# Patient Record
Sex: MALE | Race: WHITE | NOT HISPANIC OR LATINO | Employment: OTHER | URBAN - METROPOLITAN AREA
[De-identification: names, ages, dates, MRNs, and addresses within clinical notes are randomized per-mention and may not be internally consistent; named-entity substitution may affect disease eponyms.]

---

## 2017-02-27 ENCOUNTER — OFFICE VISIT (OUTPATIENT)
Dept: FAMILY MEDICINE | Facility: OTHER | Age: 57
End: 2017-02-27
Payer: COMMERCIAL

## 2017-02-27 VITALS
HEIGHT: 71 IN | WEIGHT: 190 LBS | DIASTOLIC BLOOD PRESSURE: 64 MMHG | OXYGEN SATURATION: 98 % | RESPIRATION RATE: 12 BRPM | BODY MASS INDEX: 26.6 KG/M2 | TEMPERATURE: 98.5 F | HEART RATE: 68 BPM | SYSTOLIC BLOOD PRESSURE: 106 MMHG

## 2017-02-27 DIAGNOSIS — J01.00 ACUTE NON-RECURRENT MAXILLARY SINUSITIS: Primary | ICD-10-CM

## 2017-02-27 PROCEDURE — 99213 OFFICE O/P EST LOW 20 MIN: CPT | Performed by: PHYSICIAN ASSISTANT

## 2017-02-27 RX ORDER — CLONAZEPAM 2 MG/1
2 TABLET ORAL AT BEDTIME
COMMUNITY
End: 2017-07-26

## 2017-02-27 ASSESSMENT — ANXIETY QUESTIONNAIRES
7. FEELING AFRAID AS IF SOMETHING AWFUL MIGHT HAPPEN: NOT AT ALL
6. BECOMING EASILY ANNOYED OR IRRITABLE: SEVERAL DAYS
5. BEING SO RESTLESS THAT IT IS HARD TO SIT STILL: SEVERAL DAYS
3. WORRYING TOO MUCH ABOUT DIFFERENT THINGS: NOT AT ALL
1. FEELING NERVOUS, ANXIOUS, OR ON EDGE: SEVERAL DAYS
2. NOT BEING ABLE TO STOP OR CONTROL WORRYING: NOT AT ALL
GAD7 TOTAL SCORE: 4
IF YOU CHECKED OFF ANY PROBLEMS ON THIS QUESTIONNAIRE, HOW DIFFICULT HAVE THESE PROBLEMS MADE IT FOR YOU TO DO YOUR WORK, TAKE CARE OF THINGS AT HOME, OR GET ALONG WITH OTHER PEOPLE: SOMEWHAT DIFFICULT

## 2017-02-27 ASSESSMENT — PAIN SCALES - GENERAL: PAINLEVEL: NO PAIN (0)

## 2017-02-27 ASSESSMENT — PATIENT HEALTH QUESTIONNAIRE - PHQ9: 5. POOR APPETITE OR OVEREATING: SEVERAL DAYS

## 2017-02-27 NOTE — MR AVS SNAPSHOT
"              After Visit Summary   2017    Benjamín Mendoza    MRN: [id number]           Patient Information     Date Of Birth          1960        Visit Information        Provider Department      2017 1:45 PM Aleida Larson PA-C [facility]        Today's Diagnoses     Acute non-recurrent maxillary sinusitis    -  1       Follow-ups after your visit        Who to contact     If you have questions or need follow up information about today's clinic visit or your schedule please contact [facility] directly at [phone].  Normal or non-critical lab and imaging results will be communicated to you by Cylandehart, letter or phone within 4 business days after the clinic has received the results. If you do not hear from us within 7 days, please contact the clinic through Cylandehart or phone. If you have a critical or abnormal lab result, we will notify you by phone as soon as possible.  Submit refill requests through SpaceList or call your pharmacy and they will forward the refill request to us. Please allow 3 business days for your refill to be completed.          Additional Information About Your Visit        MyChart Information     SpaceList lets you send messages to your doctor, view your test results, renew your prescriptions, schedule appointments and more. To sign up, go to [email or web address] . Click on \"Log in\" on the left side of the screen, which will take you to the Welcome page. Then click on \"Sign up Now\" on the right side of the page.     You will be asked to enter the access code listed below, as well as some personal information. Please follow the directions to create your username and password.     Your access code is: [email or web address]  Expires: 2017  3:15 PM     Your access code will  in 90 days. If you need help or a new code, please call your [facility] or [phone].        Care EveryWhere ID     This is your Care EveryWhere ID. This could be used " "by other organizations to access your [facility] medical records  [id number]        Your Vitals Were     Pulse Temperature Respirations Height Pulse Oximetry BMI (Body Mass Index)    68 98.5  F (36.9  C) (Oral) 12 5' 11\" (1.803 m) 98% 26.5 kg/m2       Blood Pressure from Last 3 Encounters:   02/27/17 106/64   11/30/16 112/70   08/10/16 122/80    Weight from Last 3 Encounters:   02/27/17 190 lb (86.2 kg)   11/30/16 185 lb 4.8 oz (84.1 kg)   08/10/16 188 lb 8 oz (85.5 kg)              Today, you had the following     No orders found for display         Today's Medication Changes          These changes are accurate as of: 2/27/17  2:05 PM.  If you have any questions, ask your nurse or doctor.               Start taking these medicines.        Dose/Directions    amoxicillin-clavulanate 875-125 MG per tablet   Commonly known as:  AUGMENTIN   Used for:  Acute non-recurrent maxillary sinusitis   Started by:  Aleida Larson PA-C        Dose:  1 tablet   Take 1 tablet by mouth 2 times daily   Quantity:  20 tablet   Refills:  0            Where to get your medicines      These medications were sent to [facility] - [address]   [address] [place] [address]     Phone:  [phone]     amoxicillin-clavulanate 875-125 MG per tablet                Primary Care Provider Office Phone # Fax #    Aleida Larson PA-C [phone]       [facility] [address] [place] [address]        Thank you!     Thank you for choosing [facility]  for your care. Our goal is always to provide you with excellent care. Hearing back from our patients is one way we can continue to improve our services. Please take a few minutes to complete the written survey that you may receive in the mail after your visit with us. Thank you!             Your Updated Medication List - Protect others around you: Learn how to safely use, store and throw away your " medicines at www.disposemymeds.org.          This list is accurate as of: 2/27/17  2:05 PM.  Always use your most recent med list.                   Brand Name Dispense Instructions for use    amoxicillin-clavulanate 875-125 MG per tablet    AUGMENTIN    20 tablet    Take 1 tablet by mouth 2 times daily       clotrimazole-betamethasone cream    LOTRISONE    30 g    Apply topically 2 times daily       klonoPIN 2 MG tablet   Generic drug:  clonazePAM      Take 2 mg by mouth 2 times daily as needed for anxiety Reported on 2/27/2017       metaxalone 800 MG tablet    SKELAXIN    90 tablet    TAKE 1 TABLET THREE TIMES A DAY       omeprazole 20 MG CR capsule    priLOSEC    90 capsule    TAKE 1 CAPSULE DAILY       ONE-A-DAY MENS PO      Take 1 tablet by mouth.       order for DME      1 Device Auto CPAP @@ 5-15 cm with heated humidifier and heated tube via mask of choice per order of Melody Orourke PA-C.       oxyCODONE-acetaminophen 5-325 MG per tablet    PERCOCET    90 tablet    Take 1-2 tablets by mouth every 4 hours as needed for pain (moderate to severe)

## 2017-02-27 NOTE — NURSING NOTE
"Chief Complaint   Patient presents with     URI       Initial /64  Pulse 68  Temp 98.5  F (36.9  C) (Oral)  Resp 12  Ht 5' 11\" (1.803 m)  Wt 190 lb (86.2 kg)  SpO2 98%  BMI 26.5 kg/m2 Estimated body mass index is 26.5 kg/(m^2) as calculated from the following:    Height as of this encounter: 5' 11\" (1.803 m).    Weight as of this encounter: 190 lb (86.2 kg).  Medication Reconciliation: complete       Tabatha Simental CMA ([facility])      "

## 2017-02-27 NOTE — PROGRESS NOTES
"  SUBJECTIVE:                                                    Benjamín Mendoza is a 56 year old male who presents to clinic today for the following health issues:      Acute Illness   Acute illness concerns:cold symptoms    Onset: since middle of January    Fever: no    Chills/Sweats: no    Headache (location?): YES, frontal    Sinus Pressure:YES, mostly frontal no cheek    Conjunctivitis:  no    Ear Pain: no    Rhinorrhea: no    Congestion: no    Sore Throat: no     Cough: YES-non-productive, productive of clear sputum    Wheeze: YES- some     Decreased Appetite: YES    Nausea: YES, upset stomach    Vomiting: no    Diarrhea:  YES- looser than normal, not diarrhea  for about 1 week no blood in stool    Dysuria/Freq.: no    Fatigue/Achiness: YES- both    Sick/Strep Exposure: YES     Therapies Tried and outcome: old Rx of amoxicillin or Augmentin took an incomplete rx of his sons, about 4 days, ioana villelazer cold plus, mucinex      Problem list and histories reviewed & adjusted, as indicated.  Additional history: as documented    BP Readings from Last 3 Encounters:   02/27/17 106/64   11/30/16 112/70   08/10/16 122/80    Wt Readings from Last 3 Encounters:   02/27/17 190 lb (86.2 kg)   11/30/16 185 lb 4.8 oz (84.1 kg)   08/10/16 188 lb 8 oz (85.5 kg)                  Labs reviewed in [facility]    ROS:  As above    OBJECTIVE:                                                    /64  Pulse 68  Temp 98.5  F (36.9  C) (Oral)  Resp 12  Ht 5' 11\" (1.803 m)  Wt 190 lb (86.2 kg)  SpO2 98%  BMI 26.5 kg/m2  Body mass index is 26.5 kg/(m^2).  GENERAL: healthy, alert and no distress  EYES: Eyes grossly normal to inspection  HENT: normal cephalic/atraumatic, ear canals and TM's normal, nose and mouth without ulcers or lesions, nasal mucosa edematous , oropharynx clear, oral mucous membranes moist and sinuses: not tender  NECK: no adenopathy, no asymmetry, masses, or scars and thyroid normal to palpation  RESP: lungs clear to " auscultation - no rales, rhonchi or wheezes  CV: regular rate and rhythm, normal S1 S2, no S3 or S4, no murmur, click or rub, no peripheral edema and peripheral pulses strong  MS: no gross musculoskeletal defects noted, no edema    Diagnostic Test Results:  none      ASSESSMENT/PLAN:                                                            1. Acute non-recurrent maxillary sinusitis  otc meds prn, take entire course even when feeling better, use probiotics   - amoxicillin-clavulanate (AUGMENTIN) 875-125 MG per tablet; Take 1 tablet by mouth 2 times daily  Dispense: 20 tablet; Refill: 0        Aleida Larson PA-C  [facility]  Electronically signed by Aleida Larson PA-C

## 2017-02-28 ASSESSMENT — PATIENT HEALTH QUESTIONNAIRE - PHQ9: SUM OF ALL RESPONSES TO PHQ QUESTIONS 1-9: 10

## 2017-02-28 ASSESSMENT — ANXIETY QUESTIONNAIRES: GAD7 TOTAL SCORE: 4

## 2017-03-20 ENCOUNTER — TELEPHONE (OUTPATIENT)
Dept: FAMILY MEDICINE | Facility: OTHER | Age: 57
End: 2017-03-20

## 2017-03-20 DIAGNOSIS — J01.01 ACUTE RECURRENT MAXILLARY SINUSITIS: Primary | ICD-10-CM

## 2017-03-20 RX ORDER — AZITHROMYCIN 250 MG/1
TABLET, FILM COATED ORAL
Qty: 6 TABLET | Refills: 0 | Status: SHIPPED | OUTPATIENT
Start: 2017-03-20 | End: 2017-04-03

## 2017-03-20 NOTE — TELEPHONE ENCOUNTER
Stated all the same things as before; nothing has really changed-sinus pressure, headaches, still coughing(non productive with some wheezing), tired and achy. Has been about 7-10 days since he took his last dose of amoxicillin. Pharmacy entered.    Tabatha Simental CMA ([facility])

## 2017-03-20 NOTE — TELEPHONE ENCOUNTER
Patient is calling to request another prescription of amoxicillin. Patient states still not feeling well. Please follow up with patient.    RADHA

## 2017-03-20 NOTE — TELEPHONE ENCOUNTER
please call pt- I sent a prescription for z pack for him to try if this does not help needs appt  Aleida Larson PA-C

## 2017-03-20 NOTE — TELEPHONE ENCOUNTER
Please call and see what his current symptoms are and what pharmacy if I send a med  Aleida Larson PA-C

## 2017-04-03 ENCOUNTER — HOSPITAL ENCOUNTER (EMERGENCY)
Facility: CLINIC | Age: 57
Discharge: LEFT BEFORE DISCHARGE | End: 2017-04-03
Payer: COMMERCIAL

## 2017-04-03 ENCOUNTER — HOSPITAL ENCOUNTER (OUTPATIENT)
Facility: CLINIC | Age: 57
Setting detail: OBSERVATION
Discharge: HOME | End: 2017-04-04
Attending: EMERGENCY MEDICINE | Admitting: INTERNAL MEDICINE
Payer: COMMERCIAL

## 2017-04-03 ENCOUNTER — OFFICE VISIT (OUTPATIENT)
Dept: FAMILY MEDICINE | Facility: CLINIC | Age: 57
End: 2017-04-03
Payer: COMMERCIAL

## 2017-04-03 ENCOUNTER — APPOINTMENT (OUTPATIENT)
Dept: GENERAL RADIOLOGY | Facility: CLINIC | Age: 57
End: 2017-04-03
Attending: EMERGENCY MEDICINE
Payer: COMMERCIAL

## 2017-04-03 VITALS
SYSTOLIC BLOOD PRESSURE: 132 MMHG | RESPIRATION RATE: 14 BRPM | TEMPERATURE: 98 F | BODY MASS INDEX: 27.96 KG/M2 | DIASTOLIC BLOOD PRESSURE: 78 MMHG | OXYGEN SATURATION: 97 % | HEIGHT: 69 IN | HEART RATE: 84 BPM | WEIGHT: 188.8 LBS

## 2017-04-03 DIAGNOSIS — E78.5 HYPERLIPIDEMIA, UNSPECIFIED HYPERLIPIDEMIA TYPE: Primary | ICD-10-CM

## 2017-04-03 DIAGNOSIS — R07.9 CHEST PAIN, UNSPECIFIED TYPE: Primary | ICD-10-CM

## 2017-04-03 DIAGNOSIS — R07.9 CHEST PAIN: ICD-10-CM

## 2017-04-03 DIAGNOSIS — I20.0 UNSTABLE ANGINA (H): ICD-10-CM

## 2017-04-03 PROBLEM — Z71.89 ADVANCED DIRECTIVES, COUNSELING/DISCUSSION: Status: ACTIVE | Noted: 2017-04-03

## 2017-04-03 LAB
ANION GAP SERPL CALCULATED.3IONS-SCNC: 10 MMOL/L (ref 3–14)
BASOPHILS # BLD AUTO: 0 10E9/L (ref 0–0.2)
BASOPHILS NFR BLD AUTO: 0.3 %
BUN SERPL-MCNC: 12 MG/DL (ref 7–30)
CALCIUM SERPL-MCNC: 8.4 MG/DL (ref 8.5–10.1)
CHLORIDE SERPL-SCNC: 109 MMOL/L (ref 94–109)
CO2 SERPL-SCNC: 25 MMOL/L (ref 20–32)
CREAT SERPL-MCNC: 0.86 MG/DL (ref 0.66–1.25)
DIFFERENTIAL METHOD BLD: ABNORMAL
EOSINOPHIL # BLD AUTO: 0.2 10E9/L (ref 0–0.7)
EOSINOPHIL NFR BLD AUTO: 3.3 %
ERYTHROCYTE [DISTWIDTH] IN BLOOD BY AUTOMATED COUNT: 13.2 % (ref 10–15)
GFR SERPL CREATININE-BSD FRML MDRD: ABNORMAL ML/MIN/1.7M2
GLUCOSE SERPL-MCNC: 111 MG/DL (ref 70–99)
HCT VFR BLD AUTO: 40.5 % (ref 40–53)
HGB BLD-MCNC: 13.8 G/DL (ref 13.3–17.7)
IMM GRANULOCYTES # BLD: 0 10E9/L (ref 0–0.4)
IMM GRANULOCYTES NFR BLD: 0.3 %
INTERPRETATION ECG - MUSE: NORMAL
LYMPHOCYTES # BLD AUTO: 1.9 10E9/L (ref 0.8–5.3)
LYMPHOCYTES NFR BLD AUTO: 30.6 %
MCH RBC QN AUTO: 29.7 PG (ref 26.5–33)
MCHC RBC AUTO-ENTMCNC: 34.1 G/DL (ref 31.5–36.5)
MCV RBC AUTO: 87 FL (ref 78–100)
MONOCYTES # BLD AUTO: 0.4 10E9/L (ref 0–1.3)
MONOCYTES NFR BLD AUTO: 6.3 %
NEUTROPHILS # BLD AUTO: 3.6 10E9/L (ref 1.6–8.3)
NEUTROPHILS NFR BLD AUTO: 59.2 %
NRBC # BLD AUTO: 0 10*3/UL
NRBC BLD AUTO-RTO: 0 /100
PLATELET # BLD AUTO: 141 10E9/L (ref 150–450)
POTASSIUM SERPL-SCNC: 3.7 MMOL/L (ref 3.4–5.3)
RBC # BLD AUTO: 4.64 10E12/L (ref 4.4–5.9)
SODIUM SERPL-SCNC: 144 MMOL/L (ref 133–144)
TROPONIN I SERPL-MCNC: NORMAL UG/L (ref 0–0.04)
WBC # BLD AUTO: 6 10E9/L (ref 4–11)

## 2017-04-03 PROCEDURE — 25000132 ZZH RX MED GY IP 250 OP 250 PS 637: Performed by: PHYSICIAN ASSISTANT

## 2017-04-03 PROCEDURE — G0378 HOSPITAL OBSERVATION PER HR: HCPCS

## 2017-04-03 PROCEDURE — 99219 ZZC INITIAL OBSERVATION CARE,LEVL II: CPT | Performed by: PHYSICIAN ASSISTANT

## 2017-04-03 PROCEDURE — 99285 EMERGENCY DEPT VISIT HI MDM: CPT

## 2017-04-03 PROCEDURE — 84484 ASSAY OF TROPONIN QUANT: CPT | Performed by: EMERGENCY MEDICINE

## 2017-04-03 PROCEDURE — 93005 ELECTROCARDIOGRAM TRACING: CPT | Mod: 59

## 2017-04-03 PROCEDURE — 80048 BASIC METABOLIC PNL TOTAL CA: CPT | Performed by: EMERGENCY MEDICINE

## 2017-04-03 PROCEDURE — 99214 OFFICE O/P EST MOD 30 MIN: CPT | Performed by: FAMILY MEDICINE

## 2017-04-03 PROCEDURE — 85025 COMPLETE CBC W/AUTO DIFF WBC: CPT | Performed by: EMERGENCY MEDICINE

## 2017-04-03 PROCEDURE — 25000132 ZZH RX MED GY IP 250 OP 250 PS 637: Performed by: EMERGENCY MEDICINE

## 2017-04-03 PROCEDURE — 71020 XR CHEST 2 VW: CPT

## 2017-04-03 PROCEDURE — 99207 ZZC CDG-CODE CATEGORY CHANGED: CPT | Performed by: PHYSICIAN ASSISTANT

## 2017-04-03 PROCEDURE — 83036 HEMOGLOBIN GLYCOSYLATED A1C: CPT | Performed by: PHYSICIAN ASSISTANT

## 2017-04-03 RX ORDER — ACETAMINOPHEN 650 MG/1
650 SUPPOSITORY RECTAL EVERY 4 HOURS PRN
Status: DISCONTINUED | OUTPATIENT
Start: 2017-04-03 | End: 2017-04-04 | Stop reason: HOSPADM

## 2017-04-03 RX ORDER — ACETAMINOPHEN 325 MG/1
650 TABLET ORAL EVERY 4 HOURS PRN
Status: DISCONTINUED | OUTPATIENT
Start: 2017-04-03 | End: 2017-04-04 | Stop reason: HOSPADM

## 2017-04-03 RX ORDER — ASPIRIN 81 MG/1
162 TABLET, CHEWABLE ORAL ONCE
Status: DISCONTINUED | OUTPATIENT
Start: 2017-04-03 | End: 2017-04-03

## 2017-04-03 RX ORDER — OXYCODONE AND ACETAMINOPHEN 5; 325 MG/1; MG/1
1-2 TABLET ORAL EVERY 4 HOURS PRN
Status: DISCONTINUED | OUTPATIENT
Start: 2017-04-03 | End: 2017-04-04 | Stop reason: HOSPADM

## 2017-04-03 RX ORDER — NITROGLYCERIN 0.4 MG/1
0.4 TABLET SUBLINGUAL EVERY 5 MIN PRN
Status: DISCONTINUED | OUTPATIENT
Start: 2017-04-03 | End: 2017-04-04 | Stop reason: HOSPADM

## 2017-04-03 RX ORDER — NALOXONE HYDROCHLORIDE 0.4 MG/ML
.1-.4 INJECTION, SOLUTION INTRAMUSCULAR; INTRAVENOUS; SUBCUTANEOUS
Status: DISCONTINUED | OUTPATIENT
Start: 2017-04-03 | End: 2017-04-04 | Stop reason: HOSPADM

## 2017-04-03 RX ORDER — ALUMINA, MAGNESIA, AND SIMETHICONE 2400; 2400; 240 MG/30ML; MG/30ML; MG/30ML
15-30 SUSPENSION ORAL EVERY 4 HOURS PRN
Status: DISCONTINUED | OUTPATIENT
Start: 2017-04-03 | End: 2017-04-04 | Stop reason: HOSPADM

## 2017-04-03 RX ORDER — ONDANSETRON 2 MG/ML
4 INJECTION INTRAMUSCULAR; INTRAVENOUS EVERY 6 HOURS PRN
Status: DISCONTINUED | OUTPATIENT
Start: 2017-04-03 | End: 2017-04-04 | Stop reason: HOSPADM

## 2017-04-03 RX ORDER — SENNOSIDES 8.6 MG
1-2 TABLET ORAL 2 TIMES DAILY PRN
Status: DISCONTINUED | OUTPATIENT
Start: 2017-04-03 | End: 2017-04-04 | Stop reason: HOSPADM

## 2017-04-03 RX ORDER — ASPIRIN 81 MG/1
81 TABLET ORAL DAILY
Status: DISCONTINUED | OUTPATIENT
Start: 2017-04-04 | End: 2017-04-04

## 2017-04-03 RX ORDER — ASPIRIN 81 MG/1
324 TABLET, CHEWABLE ORAL ONCE
Status: DISCONTINUED | OUTPATIENT
Start: 2017-04-03 | End: 2017-04-03

## 2017-04-03 RX ORDER — METOPROLOL TARTRATE 25 MG/1
25 TABLET, FILM COATED ORAL ONCE
Status: COMPLETED | OUTPATIENT
Start: 2017-04-03 | End: 2017-04-03

## 2017-04-03 RX ORDER — METAXALONE 800 MG/1
800 TABLET ORAL EVERY EVENING
Status: DISCONTINUED | OUTPATIENT
Start: 2017-04-03 | End: 2017-04-04 | Stop reason: HOSPADM

## 2017-04-03 RX ORDER — ONDANSETRON 4 MG/1
4 TABLET, ORALLY DISINTEGRATING ORAL EVERY 6 HOURS PRN
Status: DISCONTINUED | OUTPATIENT
Start: 2017-04-03 | End: 2017-04-04 | Stop reason: HOSPADM

## 2017-04-03 RX ORDER — CLONAZEPAM 1 MG/1
2 TABLET ORAL AT BEDTIME
Status: DISCONTINUED | OUTPATIENT
Start: 2017-04-03 | End: 2017-04-04 | Stop reason: HOSPADM

## 2017-04-03 RX ORDER — LIDOCAINE 40 MG/G
CREAM TOPICAL
Status: DISCONTINUED | OUTPATIENT
Start: 2017-04-03 | End: 2017-04-04

## 2017-04-03 RX ORDER — ASPIRIN 81 MG/1
324 TABLET, CHEWABLE ORAL ONCE
Status: COMPLETED | OUTPATIENT
Start: 2017-04-03 | End: 2017-04-03

## 2017-04-03 RX ORDER — METAXALONE 800 MG/1
800 TABLET ORAL EVERY EVENING
COMMUNITY
End: 2017-11-23

## 2017-04-03 RX ADMIN — OMEPRAZOLE 20 MG: 20 CAPSULE, DELAYED RELEASE ORAL at 21:53

## 2017-04-03 RX ADMIN — METOPROLOL TARTRATE 25 MG: 25 TABLET, FILM COATED ORAL at 20:44

## 2017-04-03 RX ADMIN — CLONAZEPAM 2 MG: 1 TABLET ORAL at 22:24

## 2017-04-03 RX ADMIN — ASPIRIN 81 MG 324 MG: 81 TABLET ORAL at 19:49

## 2017-04-03 RX ADMIN — METAXALONE 800 MG: 800 TABLET ORAL at 22:24

## 2017-04-03 ASSESSMENT — ENCOUNTER SYMPTOMS
FATIGUE: 1
COUGH: 0

## 2017-04-03 ASSESSMENT — PAIN SCALES - GENERAL: PAINLEVEL: SEVERE PAIN (6)

## 2017-04-03 NOTE — MR AVS SNAPSHOT
After Visit Summary   4/3/2017    Benjamín Mendoza    MRN: [id number]           Patient Information     Date Of Birth          1960        Visit Information        Provider Department      4/3/2017 3:10 PM Holger Chappell MD [facility]        Today's Diagnoses     Chest pain, unspecified type    -  1    Unstable angina (H)          Care Instructions      Preventive Health Recommendations  Male Ages 50 - 64    Yearly exam:             See your health care provider every year in order to  o   Review health changes.   o   Discuss preventive care.    o   Review your medicines if your doctor has prescribed any.     Have a cholesterol test every 5 years, or more frequently if you are at risk for high cholesterol/heart disease.     Have a diabetes test (fasting glucose) every three years. If you are at risk for diabetes, you should have this test more often.     Have a colonoscopy at age 50, or have a yearly FIT test (stool test). These exams will check for colon cancer.      Talk with your health care provider about whether or not a prostate cancer screening test (PSA) is right for you.    You should be tested each year for STDs (sexually transmitted diseases), if you re at risk.     Shots: Get a flu shot each year. Get a tetanus shot every 10 years.     Nutrition:    Eat at least 5 servings of fruits and vegetables daily.     Eat whole-grain bread, whole-wheat pasta and brown rice instead of white grains and rice.     Talk to your provider about Calcium and Vitamin D.     Lifestyle    Exercise for at least 150 minutes a week (30 minutes a day, 5 days a week). This will help you control your weight and prevent disease.     Limit alcohol to one drink per day.     No smoking.     Wear sunscreen to prevent skin cancer.     See your dentist every six months for an exam and cleaning.     See your eye doctor every 1 to 2 years.          Follow-ups after your visit        Who to contact     If you  "have questions or need follow up information about today's clinic visit or your schedule please contact [facility] directly at [phone].  Normal or non-critical lab and imaging results will be communicated to you by MyChart, letter or phone within 4 business days after the clinic has received the results. If you do not hear from us within 7 days, please contact the clinic through Arrivelyhart or phone. If you have a critical or abnormal lab result, we will notify you by phone as soon as possible.  Submit refill requests through Justin.TV or call your pharmacy and they will forward the refill request to us. Please allow 3 business days for your refill to be completed.          Additional Information About Your Visit        ArrivelyharFastConnect Information     [email or web address] lets you send messages to your doctor, view your test results, renew your prescriptions, schedule appointments and more. To sign up, go to [email or web address] . Click on \"Log in\" on the left side of the screen, which will take you to the Welcome page. Then click on \"Sign up Now\" on the right side of the page.     You will be asked to enter the access code listed below, as well as some personal information. Please follow the directions to create your username and password.     Your access code is: [email or web address]  Expires: 2017  7:06 PM     Your access code will  in 90 days. If you need help or a new code, please call your [facility] clinic or [phone].        Care EveryWhere ID     This is your Care EveryWhere ID. This could be used by other organizations to access your [facility] medical records  [id number]        Your Vitals Were     Pulse Temperature Respirations Height Pulse Oximetry BMI (Body Mass Index)    84 98  F (36.7  C) (Tympanic) 14 5' 9.2\" (1.758 m) 97% 27.72 kg/m2       Blood Pressure from Last 3 Encounters:   17 132/78   17 106/64   16 112/70    Weight from Last 3 Encounters:   17 188 lb 12.8 oz (85.6 " kg)   02/27/17 190 lb (86.2 kg)   11/30/16 185 lb 4.8 oz (84.1 kg)              Today, you had the following     No orders found for display       Primary Care Provider Office Phone # Fax #    Aleida Larson PA-C [phone]       [facility] [address] [place] [address]        Thank you!     Thank you for choosing [facility]  for your care. Our goal is always to provide you with excellent care. Hearing back from our patients is one way we can continue to improve our services. Please take a few minutes to complete the written survey that you may receive in the mail after your visit with us. Thank you!             Your Updated Medication List - Protect others around you: Learn how to safely use, store and throw away your medicines at www.disposemymeds.org.          This list is accurate as of: 4/3/17  7:06 PM.  Always use your most recent med list.                   Brand Name Dispense Instructions for use    clotrimazole-betamethasone cream    LOTRISONE    30 g    Apply topically 2 times daily       IBUPROFEN PO          klonoPIN 2 MG tablet   Generic drug:  clonazePAM      Take 2 mg by mouth 2 times daily as needed for anxiety Reported on 2/27/2017       metaxalone 800 MG tablet    SKELAXIN    90 tablet    TAKE 1 TABLET THREE TIMES A DAY       omeprazole 20 MG CR capsule    priLOSEC    90 capsule    TAKE 1 CAPSULE DAILY       ONE-A-DAY MENS PO      Take 1 tablet by mouth.       order for DME      1 Device Auto CPAP @@ 5-15 cm with heated humidifier and heated tube via mask of choice per order of Melody Orourke PA-C.       oxyCODONE-acetaminophen 5-325 MG per tablet    PERCOCET    90 tablet    Take 1-2 tablets by mouth every 4 hours as needed for pain (moderate to severe)

## 2017-04-03 NOTE — NURSING NOTE
"Chief Complaint   Patient presents with     Physical       Initial /78 (BP Location: Left arm, Patient Position: Chair, Cuff Size: Adult Large)  Pulse 84  Temp 98  F (36.7  C) (Tympanic)  Resp 14  Ht 5' 9.2\" (1.758 m)  Wt 188 lb 12.8 oz (85.6 kg)  SpO2 97%  BMI 27.72 kg/m2 Estimated body mass index is 27.72 kg/(m^2) as calculated from the following:    Height as of this encounter: 5' 9.2\" (1.758 m).    Weight as of this encounter: 188 lb 12.8 oz (85.6 kg).  Medication Reconciliation: complete   Health Maintenance Due   Topic Date Due     ADVANCE DIRECTIVE PLANNING Q5 YRS (NO INBASKET)  03/04/1980     HEPATITIS C SCREENING  03/04/1980     Reina Villatoro, [facility]      "

## 2017-04-03 NOTE — IP AVS SNAPSHOT
[facility]    [address] [place] [address]., [address]    [place] [address]    Phone:  [phone]                                       After Visit Summary   4/3/2017    Benjamín Mendoza    MRN: [id number]           After Visit Summary Signature Page     I have received my discharge instructions, and my questions have been answered. I have discussed any challenges I see with this plan with the nurse or doctor.    ..........................................................................................................................................  Patient/Patient Representative Signature      ..........................................................................................................................................  Patient Representative Print Name and Relationship to Patient    ..................................................               ................................................  Date                                            Time    ..........................................................................................................................................  Reviewed by Signature/Title    ...................................................              ..............................................  Date                                                            Time

## 2017-04-03 NOTE — PROGRESS NOTES
"  SUBJECTIVE:                                                    Benjamín Mendoza is a 57 year old male who presents to clinic today for the following health issues:    Chief Complaint   Patient presents with     Chest Pain     off and on many years-getting worse.      Shortness of Breath     Fatigue     HPI: Benjamín presents today with complaint of chest pain. This has been going on for many years but in the past few months have become more frequent. He describes the pain as a \"tightness\" or \"heaviness\" occurring left of his sternum and radiating into his bilateral neck and jaw. It is accompanied by shortness of breath, a \"rust-like\" taste in his mouth, lightheadedness/dizzness, occasional vision change, and occasional diaphoresis. Episodes can occur at rest and are not triggered by activity, they can occur with stress. These typically last 5-10 minutes. Benjamín had two episodes of chest pain today at rest, one while talking on the phone. He is generally pretty active and states that he can walk a couple flights of stairs before getting SOB. However, he has noticed increased fatigue in the past several months.    Benjamín has had similar episodes in his 20s at which time he wore a Holter monitor for a week and was told he had a \"third heartbeat,\" but this did not require any medical intervention. He has never smoked and has no other history of heart or vascular problems. Family history is notable for cerebral aneurysm in his mother and a half-sister with heart disease. He does not know his biological father's health history.     Benjamín does have a history of GERD, treated with omeprazole, and he states that these symptoms are nothing like his reflux symptoms.     ROS is also notable for nocturnal abdominal pain occurring 1x/week for the last 2 months, awakening him from sleep. The pain resolves after passing flatus. Additionally, Benjamín reports poor circulation particularly in his bilateral forefingers, which turn white with " cold.    PROBLEM LIST:  Patient Active Problem List    Diagnosis Date Noted     Insomnia, unspecified type 08/10/2016     Priority: Medium     Chronic midline low back pain without sciatica 08/10/2016     Priority: Medium     Patient is followed by ERIBERTO KONG for ongoing prescription of pain medication.  All refills should be approved by this provider, or covering partner.    Medication(s): Oxycodone 5-3 25.   Maximum quantity per month: 90 for 3 months  Clinic visit frequency required: Q 6  months     Controlled substance agreement:  Encounter-Level CSA:     There are no encounter-level csa.      letter updated August 10, 2016   Pain Clinic evaluation in the past: No    DIRE Total Score(s):  No flowsheet data found.    Last [facility] website verification:  done on august 10,2016   [email or web address]       Chronic pain syndrome 08/10/2016     Priority: Medium     Tinnitus of both ears 01/20/2015     Priority: Medium     Advanced directives, counseling/discussion 04/03/2017     Gave pt information on 04/03/17.  Reina Villatoro, [facility]         GERD (gastroesophageal reflux disease) 11/20/2013     CARDIOVASCULAR SCREENING; LDL GOAL LESS THAN 160 10/31/2010     CARY (obstructive sleep apnea) 02/03/2010     PDS 3/2009 AHI 26 Usha 84%        Headache 03/26/2007     Patient is followed by NIRAV MONAE for ongoing prescription of narcotic pain medicine.  Med: percocet.   Maximum use per month: 15-30  Expected duration: ongoing  Narcotic agreement on file: YES  Clinic visit recommended: Q 6  Months    Patient is followed by ERIBERTO KONG for ongoing prescription of narcotic pain medicine.  Med: Percocet.   Maximum use per month:  15-30  Expected duration: Chronic  Narcotic agreement on file: YES  Clinic visit recommended: Q 6  Months  January 31, 2013     Problem list name updated by automated process. Provider to review        PAST MEDICAL HISTORY:  Past Medical History:    Diagnosis Date     Chronic back pain     percocet, spinal fluid leak dx but never visible     Chronic headaches      Esophageal reflux      GERD (gastroesophageal reflux disease)      CARY (obstructive sleep apnea) 3/2009    AHI 26 Ndir 84%     RLS (restless legs syndrome)      PAST SURGICAL HISTORY:  Past Surgical History:   Procedure Laterality Date     COLONOSCOPY  06/12/07     HC TRABECULOPLASTY BY LASER SURGERY  2000    LASIK     SEPTOPLASTY, TURBINOPLASTY, COMBINED N/A 4/26/2016    Procedure: COMBINED SEPTOPLASTY, TURBINOPLASTY;  Surgeon: Ken Molina MD;  Location: [facility]       MEDICATIONS:  Current Outpatient Prescriptions   Medication Sig Dispense Refill     IBUPROFEN PO        clonazePAM (KLONOPIN) 2 MG tablet Take 2 mg by mouth 2 times daily as needed for anxiety Reported on 2/27/2017       omeprazole (PRILOSEC) 20 MG CR capsule TAKE 1 CAPSULE DAILY 90 capsule 3     metaxalone (SKELAXIN) 800 MG tablet TAKE 1 TABLET THREE TIMES A DAY 90 tablet 2     ORDER FOR DME 1 Device Auto CPAP @@ 5-15 cm with heated humidifier and heated tube via mask of choice per order of Melody Orourke PA-C.       Multiple Vitamin (ONE-A-DAY MENS PO) Take 1 tablet by mouth.       clotrimazole-betamethasone (LOTRISONE) cream Apply topically 2 times daily (Patient not taking: Reported on 4/3/2017) 30 g 1     oxyCODONE-acetaminophen (PERCOCET) 5-325 MG per tablet Take 1-2 tablets by mouth every 4 hours as needed for pain (moderate to severe) (Patient not taking: Reported on 2/27/2017) 90 tablet 0      ALLERGIES:  Allergies   Allergen Reactions     Sumatriptan      Migraine medications     Problem list and histories reviewed & adjusted, as indicated.    ROS:  Constitutional, HEENT, cardiovascular, pulmonary, GI, , musculoskeletal, neuro, skin, endocrine and psych systems are negative, except as otherwise noted in the HPI.    OBJECTIVE:                                                    /78 (BP  "Location: Left arm, Patient Position: Chair, Cuff Size: Adult Large)  Pulse 84  Temp 98  F (36.7  C) (Tympanic)  Resp 14  Ht 5' 9.2\" (1.758 m)  Wt 188 lb 12.8 oz (85.6 kg)  SpO2 97%  BMI 27.72 kg/m2  Body mass index is 27.72 kg/(m^2).  GENERAL: Healthy, well-appearing lean male, alert and no distress  HENT: No tenderness to palpation of temples bilaterally or jawline  NECK: Supple, no JVD, no carotid bruits  RESP: Lungs clear to auscultation bilaterally, no wheezes or crackles  CV: Regular rate and rhythm, normal S1 S2, no S3 or S4, no murmur, click or rub, no peripheral edema and peripheral pulses strong   ABDOMEN: Soft, nontender, no hepatosplenomegaly, no masses and bowel sounds normal    Diagnostic Test Results:  none      ASSESSMENT/PLAN:                                                    (R07.9) Chest pain, unspecified type  (primary encounter diagnosis)  (I20.0) Unstable angina (H)  Comment: Benjamín Mendoza is a 57 year old male presenting with substernal chest pain radiating to his neck and jaws for the last several years increasing in frequency over the past few months. Episodes can occur at rest and are accompanied by SOB, diaphoresis, metallic taste, lightheadedness, fatigue and vision changes. Cardiovascular risk factors include male sex and age, no known personal or family history of CAD. Exam was essentially benign aside from prehypertensive blood pressure reading with normal CV and respiratory exam. Based on patient's history and physical, presentation is concerning for unstable angiogram requiring urgent coronary angiogram.   Plan:   - Spoke with cardiologist (Dr. Lakhani) over the phone who recommended patient go directly to ED to facilitate trip to [facility] for coronary angiogram. Spoke with [facility] ED provider (Dr. Lopez) who was willing to facilitate the transfer and initiate workup with EKG and troponins. However, after informing the patient of this plan he did not want to take the " ambulance and preferred to go home and have his wife drive him down. We did discuss the risks of doing this as unstable angina is very unpredictable and he is at risk of myocardial infarction. Despite discussion of the risks patient insisted on driving himself. Spoke with the ED provider in [facility] to inform providers to expect patient arrival. It was our intent to get his labs and EKG in our ER so they were not ordered in our clinic so they were not performed as the patient left for [facility]     Patient was seen and examined by myself and Dr. Chappell. The note was then scribed by me.    Ting Lay, MS3    This patient was seen and examined by myself as well as the medical student.  The medical student scribed the note and I have reviewed it, edited it appropriately, and agree with the final documentation.     Electronically signed by:   Holger Chappell MD    4/3/2017

## 2017-04-03 NOTE — IP AVS SNAPSHOT
MRN:[id number]                      After Visit Summary   4/3/2017    Benjamín Mendoza    MRN: [id number]           Thank you!     Thank you for choosing [facility] for your care. Our goal is always to provide you with excellent care. Hearing back from our patients is one way we can continue to improve our services. Please take a few minutes to complete the written survey that you may receive in the mail after you visit with us. Thank you!        Patient Information     Date Of Birth          1960        About your hospital stay     You were admitted on:  April 3, 2017 You last received care in the:  [facility]    You were discharged on:  April 4, 2017        Reason for your hospital stay       You were admitted for evaluation of chest pain. You had a complete cardiac work-up including coronary angiogram which revealed minimal coronary artery disease. Your chest pain is not thought to be cardiac in nature. You will be started on a medication for your cholesterol and a baby aspirin to reduce your cardiovascular disease risk factors. Discontinue/limit use of ibuprofen as this can cause stomach irritation. Follow up with your primary MD and consider referral to GI for EGD to evaluate other causes of your chest pain                  Who to Call     For medical emergencies, please call 911.  For non-urgent questions about your medical care, please call your primary care provider or clinic, [phone]          Attending Provider     Provider Specialty    Jose A Barnes MD Emergency Medicine    [facility]       Primary Care Provider Office Phone # Fax #    Aleida Larson PA-C [phone]       [place] [facility] [address] [place] [address]        After Care Instructions     Activity       Your activity upon discharge: activity as tolerated            Diet       Follow this diet upon discharge: Orders Placed This  "Encounter      Combination Diet Low Saturated                  Follow-up Appointments     Follow-up and recommended labs and tests        Follow up with primary care provider, Aleida Larson, within 7-10 days for hospital follow- up.    Given prolonged history of GERD consider outpatient GI evaluation  +/- EGD for further evaluation for chest pain                  Pending Results     Date and Time Order Name Status Description    2017 0818 EKG 12-lead, tracing only Preliminary             Statement of Approval     Ordered          17 1408  I have reviewed and agree with all the recommendations and orders detailed in this document.  EFFECTIVE NOW     Approved and electronically signed by:  Madelin Marx PA-C             Admission Information     Date & Time Provider Department Dept. Phone    4/3/2017 Raji Stanley MD [facility] [phone]      Your Vitals Were     Blood Pressure Temperature Respirations Height Weight Pulse Oximetry    99/59 (BP Location: Left arm) 97.6  F (36.4  C) (Oral) 16 1.778 m (5' 10\") 84.2 kg (185 lb 10 oz) 97%    BMI (Body Mass Index)                   26.63 kg/m2           [employer] Information     [employer] lets you send messages to your doctor, view your test results, renew your prescriptions, schedule appointments and more. To sign up, go to [email or web address] . Click on \"Log in\" on the left side of the screen, which will take you to the Welcome page. Then click on \"Sign up Now\" on the right side of the page.     You will be asked to enter the access code listed below, as well as some personal information. Please follow the directions to create your username and password.     Your access code is: [email or web address]  Expires: 2017  7:06 PM     Your access code will  in 90 days. If you need help or a new code, please call your [place] clinic or [phone].        Care EveryWhere ID     This is your Care EveryWhere ID. This " could be used by other organizations to access your [facility] medical records  [id number]           Review of your medicines      START taking        Dose / Directions    aspirin EC 81 MG EC tablet   Used for:  Hyperlipidemia, unspecified hyperlipidemia type        Dose:  81 mg   Take 1 tablet (81 mg) by mouth daily   Quantity:  60 tablet   Refills:  0       atorvastatin 40 MG tablet   Commonly known as:  LIPITOR   Used for:  Hyperlipidemia, unspecified hyperlipidemia type        Dose:  40 mg   Take 1 tablet (40 mg) by mouth daily   Quantity:  30 tablet   Refills:  1         CONTINUE these medicines which have NOT CHANGED        Dose / Directions    klonoPIN 2 MG tablet   Generic drug:  clonazePAM        Dose:  2 mg   Take 2 mg by mouth At Bedtime Reported on 2/27/2017   Refills:  0       metaxalone 800 MG tablet   Commonly known as:  SKELAXIN        Dose:  800 mg   Take 800 mg by mouth every evening   Refills:  0       omeprazole 20 MG CR capsule   Commonly known as:  priLOSEC   Used for:  Gastroesophageal reflux disease without esophagitis        TAKE 1 CAPSULE DAILY   Quantity:  90 capsule   Refills:  3       ONE-A-DAY MENS PO   Notes to Patient:  Please resume home medication dosing          Dose:  1 tablet   Take 1 tablet by mouth every evening   Refills:  0       order for DME        Dose:  1 Device   1 Device Auto CPAP @@ 5-15 cm with heated humidifier and heated tube via mask of choice per order of Melody Orourke PA-C.   Refills:  0       oxyCODONE-acetaminophen 5-325 MG per tablet   Commonly known as:  PERCOCET   Used for:  Chronic midline low back pain without sciatica        Dose:  1-2 tablet   Take 1-2 tablets by mouth every 4 hours as needed for pain (moderate to severe)   Quantity:  90 tablet   Refills:  0         STOP taking     IBUPROFEN PO                Where to get your medicines      These medications were sent to [facility] - [address]   [address]  [place] [address]     Phone:  [phone]     aspirin EC 81 MG EC tablet    atorvastatin 40 MG tablet                Protect others around you: Learn how to safely use, store and throw away your medicines at www.disposemymeds.org.             Medication List: This is a list of all your medications and when to take them. Check marks below indicate your daily home schedule. Keep this list as a reference.      Medications           Morning Afternoon Evening Bedtime As Needed    aspirin EC 81 MG EC tablet   Take 1 tablet (81 mg) by mouth daily   Last time this was given:  325 mg on 4/4/2017 11:37 AM   Next Dose Due:  4/5/2017                                   atorvastatin 40 MG tablet   Commonly known as:  LIPITOR   Take 1 tablet (40 mg) by mouth daily   Next Dose Due:  4/4/2017                                   klonoPIN 2 MG tablet   Take 2 mg by mouth At Bedtime Reported on 2/27/2017   Last time this was given:  2 mg on 4/3/2017 10:24 PM   Generic drug:  clonazePAM   Next Dose Due:  4/4/2017                                     metaxalone 800 MG tablet   Commonly known as:  SKELAXIN   Take 800 mg by mouth every evening   Last time this was given:  800 mg on 4/3/2017 10:24 PM   Next Dose Due:  4/4/2017                                     omeprazole 20 MG CR capsule   Commonly known as:  priLOSEC   TAKE 1 CAPSULE DAILY   Last time this was given:  20 mg on 4/3/2017  9:53 PM   Next Dose Due:  4/4/2017                                     ONE-A-DAY MENS PO   Take 1 tablet by mouth every evening   Notes to Patient:  Please resume home medication dosing                                  order for DME   1 Device Auto CPAP @@ 5-15 cm with heated humidifier and heated tube via mask of choice per order of Melody Orourke PA-C.                                oxyCODONE-acetaminophen 5-325 MG per tablet   Commonly known as:  PERCOCET   Take 1-2 tablets by mouth every 4 hours as needed for pain (moderate to severe)

## 2017-04-04 ENCOUNTER — APPOINTMENT (OUTPATIENT)
Dept: CARDIOLOGY | Facility: CLINIC | Age: 57
End: 2017-04-04
Attending: INTERNAL MEDICINE
Payer: COMMERCIAL

## 2017-04-04 VITALS
BODY MASS INDEX: 26.57 KG/M2 | TEMPERATURE: 97.6 F | DIASTOLIC BLOOD PRESSURE: 59 MMHG | HEIGHT: 70 IN | SYSTOLIC BLOOD PRESSURE: 99 MMHG | OXYGEN SATURATION: 97 % | WEIGHT: 185.63 LBS | RESPIRATION RATE: 16 BRPM

## 2017-04-04 LAB
CHOLEST SERPL-MCNC: 220 MG/DL
HBA1C MFR BLD: 5.3 % (ref 4.3–6)
HDLC SERPL-MCNC: 38 MG/DL
LDLC SERPL CALC-MCNC: 157 MG/DL
NONHDLC SERPL-MCNC: 182 MG/DL
TRIGL SERPL-MCNC: 127 MG/DL
TROPONIN I SERPL-MCNC: NORMAL UG/L (ref 0–0.04)
TROPONIN I SERPL-MCNC: NORMAL UG/L (ref 0–0.04)
TSH SERPL DL<=0.005 MIU/L-ACNC: 1.93 MU/L (ref 0.4–4)

## 2017-04-04 PROCEDURE — 27210892 ZZH CATH CR4

## 2017-04-04 PROCEDURE — 99152 MOD SED SAME PHYS/QHP 5/>YRS: CPT | Performed by: INTERNAL MEDICINE

## 2017-04-04 PROCEDURE — 27211089 ZZH KIT ACIST INJECTOR CR3

## 2017-04-04 PROCEDURE — 36415 COLL VENOUS BLD VENIPUNCTURE: CPT | Performed by: INTERNAL MEDICINE

## 2017-04-04 PROCEDURE — B2111ZZ FLUOROSCOPY OF MULTIPLE CORONARY ARTERIES USING LOW OSMOLAR CONTRAST: ICD-10-PCS | Performed by: INTERNAL MEDICINE

## 2017-04-04 PROCEDURE — 93005 ELECTROCARDIOGRAM TRACING: CPT | Mod: 59

## 2017-04-04 PROCEDURE — 27210795 ZZH PAD DEFIB QUICK CR4

## 2017-04-04 PROCEDURE — 25000128 H RX IP 250 OP 636: Performed by: INTERNAL MEDICINE

## 2017-04-04 PROCEDURE — 99152 MOD SED SAME PHYS/QHP 5/>YRS: CPT

## 2017-04-04 PROCEDURE — 99217 ZZC OBSERVATION CARE DISCHARGE: CPT | Performed by: PHYSICIAN ASSISTANT

## 2017-04-04 PROCEDURE — 93458 L HRT ARTERY/VENTRICLE ANGIO: CPT | Mod: 26 | Performed by: INTERNAL MEDICINE

## 2017-04-04 PROCEDURE — C1769 GUIDE WIRE: HCPCS

## 2017-04-04 PROCEDURE — 93010 ELECTROCARDIOGRAM REPORT: CPT | Performed by: INTERNAL MEDICINE

## 2017-04-04 PROCEDURE — 27210856 ZZH ACCESS HEART CATH CR2

## 2017-04-04 PROCEDURE — 25000125 ZZHC RX 250: Performed by: INTERNAL MEDICINE

## 2017-04-04 PROCEDURE — 84484 ASSAY OF TROPONIN QUANT: CPT | Performed by: PHYSICIAN ASSISTANT

## 2017-04-04 PROCEDURE — 93458 L HRT ARTERY/VENTRICLE ANGIO: CPT

## 2017-04-04 PROCEDURE — 4A023N7 MEASUREMENT OF CARDIAC SAMPLING AND PRESSURE, LEFT HEART, PERCUTANEOUS APPROACH: ICD-10-PCS | Performed by: INTERNAL MEDICINE

## 2017-04-04 PROCEDURE — 27210946 ZZH KIT HC TOTES DISP CR8

## 2017-04-04 PROCEDURE — 99204 OFFICE O/P NEW MOD 45 MIN: CPT | Mod: 25 | Performed by: INTERNAL MEDICINE

## 2017-04-04 PROCEDURE — 84443 ASSAY THYROID STIM HORMONE: CPT | Performed by: INTERNAL MEDICINE

## 2017-04-04 PROCEDURE — 25000132 ZZH RX MED GY IP 250 OP 250 PS 637: Performed by: PHYSICIAN ASSISTANT

## 2017-04-04 PROCEDURE — 36415 COLL VENOUS BLD VENIPUNCTURE: CPT | Performed by: PHYSICIAN ASSISTANT

## 2017-04-04 PROCEDURE — 27210787 ZZH MANIFOLD CR2

## 2017-04-04 PROCEDURE — 80061 LIPID PANEL: CPT | Performed by: PHYSICIAN ASSISTANT

## 2017-04-04 PROCEDURE — 27210914 ZZH SHEATH CR8

## 2017-04-04 PROCEDURE — G0378 HOSPITAL OBSERVATION PER HR: HCPCS

## 2017-04-04 PROCEDURE — 84484 ASSAY OF TROPONIN QUANT: CPT | Performed by: INTERNAL MEDICINE

## 2017-04-04 PROCEDURE — 25000132 ZZH RX MED GY IP 250 OP 250 PS 637: Performed by: INTERNAL MEDICINE

## 2017-04-04 RX ORDER — IOPAMIDOL 755 MG/ML
85 INJECTION, SOLUTION INTRAVASCULAR ONCE
Status: COMPLETED | OUTPATIENT
Start: 2017-04-04 | End: 2017-04-04

## 2017-04-04 RX ORDER — HYDRALAZINE HYDROCHLORIDE 20 MG/ML
10-20 INJECTION INTRAMUSCULAR; INTRAVENOUS
Status: DISCONTINUED | OUTPATIENT
Start: 2017-04-04 | End: 2017-04-04 | Stop reason: HOSPADM

## 2017-04-04 RX ORDER — ONDANSETRON 2 MG/ML
4 INJECTION INTRAMUSCULAR; INTRAVENOUS EVERY 4 HOURS PRN
Status: DISCONTINUED | OUTPATIENT
Start: 2017-04-04 | End: 2017-04-04 | Stop reason: HOSPADM

## 2017-04-04 RX ORDER — LIDOCAINE 40 MG/G
CREAM TOPICAL
Status: DISCONTINUED | OUTPATIENT
Start: 2017-04-04 | End: 2017-04-04 | Stop reason: HOSPADM

## 2017-04-04 RX ORDER — PROTAMINE SULFATE 10 MG/ML
1-5 INJECTION, SOLUTION INTRAVENOUS
Status: DISCONTINUED | OUTPATIENT
Start: 2017-04-04 | End: 2017-04-04 | Stop reason: HOSPADM

## 2017-04-04 RX ORDER — FUROSEMIDE 10 MG/ML
20-100 INJECTION INTRAMUSCULAR; INTRAVENOUS
Status: DISCONTINUED | OUTPATIENT
Start: 2017-04-04 | End: 2017-04-04 | Stop reason: HOSPADM

## 2017-04-04 RX ORDER — HEPARIN SODIUM 1000 [USP'U]/ML
1000-10000 INJECTION, SOLUTION INTRAVENOUS; SUBCUTANEOUS EVERY 5 MIN PRN
Status: DISCONTINUED | OUTPATIENT
Start: 2017-04-04 | End: 2017-04-04 | Stop reason: HOSPADM

## 2017-04-04 RX ORDER — MORPHINE SULFATE 2 MG/ML
1-2 INJECTION, SOLUTION INTRAMUSCULAR; INTRAVENOUS EVERY 5 MIN PRN
Status: DISCONTINUED | OUTPATIENT
Start: 2017-04-04 | End: 2017-04-04 | Stop reason: HOSPADM

## 2017-04-04 RX ORDER — DIPHENHYDRAMINE HYDROCHLORIDE 50 MG/ML
25-50 INJECTION INTRAMUSCULAR; INTRAVENOUS
Status: DISCONTINUED | OUTPATIENT
Start: 2017-04-04 | End: 2017-04-04 | Stop reason: HOSPADM

## 2017-04-04 RX ORDER — FLUMAZENIL 0.1 MG/ML
0.2 INJECTION, SOLUTION INTRAVENOUS
Status: DISCONTINUED | OUTPATIENT
Start: 2017-04-04 | End: 2017-04-04 | Stop reason: HOSPADM

## 2017-04-04 RX ORDER — METOPROLOL SUCCINATE 50 MG/1
50 TABLET, EXTENDED RELEASE ORAL DAILY
Status: DISCONTINUED | OUTPATIENT
Start: 2017-04-04 | End: 2017-04-04 | Stop reason: HOSPADM

## 2017-04-04 RX ORDER — POTASSIUM CHLORIDE 1500 MG/1
20 TABLET, EXTENDED RELEASE ORAL
Status: COMPLETED | OUTPATIENT
Start: 2017-04-04 | End: 2017-04-04

## 2017-04-04 RX ORDER — LIDOCAINE HYDROCHLORIDE 10 MG/ML
30 INJECTION, SOLUTION EPIDURAL; INFILTRATION; INTRACAUDAL; PERINEURAL
Status: COMPLETED | OUTPATIENT
Start: 2017-04-04 | End: 2017-04-04

## 2017-04-04 RX ORDER — NALOXONE HYDROCHLORIDE 0.4 MG/ML
.2-.4 INJECTION, SOLUTION INTRAMUSCULAR; INTRAVENOUS; SUBCUTANEOUS
Status: DISCONTINUED | OUTPATIENT
Start: 2017-04-04 | End: 2017-04-04 | Stop reason: HOSPADM

## 2017-04-04 RX ORDER — PROMETHAZINE HYDROCHLORIDE 25 MG/ML
6.25-25 INJECTION, SOLUTION INTRAMUSCULAR; INTRAVENOUS EVERY 4 HOURS PRN
Status: DISCONTINUED | OUTPATIENT
Start: 2017-04-04 | End: 2017-04-04 | Stop reason: HOSPADM

## 2017-04-04 RX ORDER — EPTIFIBATIDE 2 MG/ML
180 INJECTION, SOLUTION INTRAVENOUS EVERY 10 MIN PRN
Status: DISCONTINUED | OUTPATIENT
Start: 2017-04-04 | End: 2017-04-04 | Stop reason: HOSPADM

## 2017-04-04 RX ORDER — DOPAMINE HYDROCHLORIDE 160 MG/100ML
2-20 INJECTION, SOLUTION INTRAVENOUS CONTINUOUS PRN
Status: DISCONTINUED | OUTPATIENT
Start: 2017-04-04 | End: 2017-04-04 | Stop reason: HOSPADM

## 2017-04-04 RX ORDER — ASPIRIN 81 MG/1
81 TABLET ORAL DAILY
Qty: 60 TABLET | Refills: 0 | Status: SHIPPED | OUTPATIENT
Start: 2017-04-04 | End: 2019-04-17

## 2017-04-04 RX ORDER — DOBUTAMINE HYDROCHLORIDE 200 MG/100ML
2-20 INJECTION INTRAVENOUS CONTINUOUS PRN
Status: DISCONTINUED | OUTPATIENT
Start: 2017-04-04 | End: 2017-04-04 | Stop reason: HOSPADM

## 2017-04-04 RX ORDER — FENTANYL CITRATE 50 UG/ML
25-50 INJECTION, SOLUTION INTRAMUSCULAR; INTRAVENOUS
Status: DISCONTINUED | OUTPATIENT
Start: 2017-04-04 | End: 2017-04-04 | Stop reason: HOSPADM

## 2017-04-04 RX ORDER — NITROGLYCERIN 5 MG/ML
100-200 VIAL (ML) INTRAVENOUS
Status: DISCONTINUED | OUTPATIENT
Start: 2017-04-04 | End: 2017-04-04 | Stop reason: HOSPADM

## 2017-04-04 RX ORDER — LORAZEPAM 2 MG/ML
.5-2 INJECTION INTRAMUSCULAR EVERY 4 HOURS PRN
Status: DISCONTINUED | OUTPATIENT
Start: 2017-04-04 | End: 2017-04-04 | Stop reason: HOSPADM

## 2017-04-04 RX ORDER — PHENYLEPHRINE HCL IN 0.9% NACL 1 MG/10 ML
20-100 SYRINGE (ML) INTRAVENOUS
Status: DISCONTINUED | OUTPATIENT
Start: 2017-04-04 | End: 2017-04-04 | Stop reason: HOSPADM

## 2017-04-04 RX ORDER — ADENOSINE 3 MG/ML
12-12000 INJECTION, SOLUTION INTRAVENOUS
Status: DISCONTINUED | OUTPATIENT
Start: 2017-04-04 | End: 2017-04-04 | Stop reason: HOSPADM

## 2017-04-04 RX ORDER — ATORVASTATIN CALCIUM 40 MG/1
40 TABLET, FILM COATED ORAL DAILY
Qty: 30 TABLET | Refills: 1 | Status: SHIPPED | OUTPATIENT
Start: 2017-04-04 | End: 2017-04-25

## 2017-04-04 RX ORDER — SODIUM NITROPRUSSIDE 25 MG/ML
100-200 INJECTION INTRAVENOUS
Status: DISCONTINUED | OUTPATIENT
Start: 2017-04-04 | End: 2017-04-04 | Stop reason: HOSPADM

## 2017-04-04 RX ORDER — VERAPAMIL HYDROCHLORIDE 2.5 MG/ML
1-2.5 INJECTION, SOLUTION INTRAVENOUS
Status: COMPLETED | OUTPATIENT
Start: 2017-04-04 | End: 2017-04-04

## 2017-04-04 RX ORDER — ENALAPRILAT 1.25 MG/ML
1.25-2.5 INJECTION INTRAVENOUS
Status: DISCONTINUED | OUTPATIENT
Start: 2017-04-04 | End: 2017-04-04 | Stop reason: HOSPADM

## 2017-04-04 RX ORDER — LIDOCAINE HYDROCHLORIDE 10 MG/ML
1-10 INJECTION, SOLUTION EPIDURAL; INFILTRATION; INTRACAUDAL; PERINEURAL
Status: DISCONTINUED | OUTPATIENT
Start: 2017-04-04 | End: 2017-04-04 | Stop reason: HOSPADM

## 2017-04-04 RX ORDER — ASPIRIN 325 MG
325 TABLET ORAL
Status: DISCONTINUED | OUTPATIENT
Start: 2017-04-04 | End: 2017-04-04 | Stop reason: HOSPADM

## 2017-04-04 RX ORDER — NITROGLYCERIN 0.4 MG/1
0.4 TABLET SUBLINGUAL EVERY 5 MIN PRN
Status: DISCONTINUED | OUTPATIENT
Start: 2017-04-04 | End: 2017-04-04 | Stop reason: HOSPADM

## 2017-04-04 RX ORDER — NITROGLYCERIN 5 MG/ML
100-500 VIAL (ML) INTRAVENOUS
Status: COMPLETED | OUTPATIENT
Start: 2017-04-04 | End: 2017-04-04

## 2017-04-04 RX ORDER — LORAZEPAM 0.5 MG/1
0.5 TABLET ORAL
Status: DISCONTINUED | OUTPATIENT
Start: 2017-04-04 | End: 2017-04-04 | Stop reason: HOSPADM

## 2017-04-04 RX ORDER — SODIUM CHLORIDE 9 MG/ML
INJECTION, SOLUTION INTRAVENOUS CONTINUOUS
Status: DISCONTINUED | OUTPATIENT
Start: 2017-04-04 | End: 2017-04-04 | Stop reason: HOSPADM

## 2017-04-04 RX ORDER — METHYLPREDNISOLONE SODIUM SUCCINATE 125 MG/2ML
125 INJECTION, POWDER, LYOPHILIZED, FOR SOLUTION INTRAMUSCULAR; INTRAVENOUS
Status: DISCONTINUED | OUTPATIENT
Start: 2017-04-04 | End: 2017-04-04 | Stop reason: HOSPADM

## 2017-04-04 RX ORDER — POTASSIUM CHLORIDE 7.45 MG/ML
10 INJECTION INTRAVENOUS
Status: DISCONTINUED | OUTPATIENT
Start: 2017-04-04 | End: 2017-04-04 | Stop reason: HOSPADM

## 2017-04-04 RX ORDER — CLOPIDOGREL BISULFATE 75 MG/1
300-600 TABLET ORAL
Status: DISCONTINUED | OUTPATIENT
Start: 2017-04-04 | End: 2017-04-04 | Stop reason: HOSPADM

## 2017-04-04 RX ORDER — NALOXONE HYDROCHLORIDE 0.4 MG/ML
.1-.4 INJECTION, SOLUTION INTRAMUSCULAR; INTRAVENOUS; SUBCUTANEOUS
Status: DISCONTINUED | OUTPATIENT
Start: 2017-04-04 | End: 2017-04-04 | Stop reason: HOSPADM

## 2017-04-04 RX ORDER — NITROGLYCERIN 20 MG/100ML
.07-2 INJECTION INTRAVENOUS CONTINUOUS PRN
Status: DISCONTINUED | OUTPATIENT
Start: 2017-04-04 | End: 2017-04-04 | Stop reason: HOSPADM

## 2017-04-04 RX ORDER — DIPHENHYDRAMINE HCL 25 MG
50 CAPSULE ORAL ONCE
Status: COMPLETED | OUTPATIENT
Start: 2017-04-04 | End: 2017-04-04

## 2017-04-04 RX ORDER — NALOXONE HYDROCHLORIDE 0.4 MG/ML
0.4 INJECTION, SOLUTION INTRAMUSCULAR; INTRAVENOUS; SUBCUTANEOUS EVERY 5 MIN PRN
Status: DISCONTINUED | OUTPATIENT
Start: 2017-04-04 | End: 2017-04-04 | Stop reason: HOSPADM

## 2017-04-04 RX ORDER — NIFEDIPINE 10 MG/1
10 CAPSULE ORAL
Status: DISCONTINUED | OUTPATIENT
Start: 2017-04-04 | End: 2017-04-04 | Stop reason: HOSPADM

## 2017-04-04 RX ORDER — ASPIRIN 81 MG/1
81-324 TABLET, CHEWABLE ORAL
Status: DISCONTINUED | OUTPATIENT
Start: 2017-04-04 | End: 2017-04-04 | Stop reason: HOSPADM

## 2017-04-04 RX ORDER — ATORVASTATIN CALCIUM 40 MG/1
40 TABLET, FILM COATED ORAL DAILY
Status: DISCONTINUED | OUTPATIENT
Start: 2017-04-04 | End: 2017-04-04 | Stop reason: HOSPADM

## 2017-04-04 RX ORDER — CLOPIDOGREL BISULFATE 75 MG/1
75 TABLET ORAL
Status: DISCONTINUED | OUTPATIENT
Start: 2017-04-04 | End: 2017-04-04 | Stop reason: HOSPADM

## 2017-04-04 RX ORDER — DEXTROSE MONOHYDRATE 25 G/50ML
12.5-5 INJECTION, SOLUTION INTRAVENOUS EVERY 30 MIN PRN
Status: DISCONTINUED | OUTPATIENT
Start: 2017-04-04 | End: 2017-04-04 | Stop reason: HOSPADM

## 2017-04-04 RX ORDER — NICARDIPINE HYDROCHLORIDE 2.5 MG/ML
100 INJECTION INTRAVENOUS
Status: DISCONTINUED | OUTPATIENT
Start: 2017-04-04 | End: 2017-04-04 | Stop reason: HOSPADM

## 2017-04-04 RX ORDER — PROTAMINE SULFATE 10 MG/ML
25-100 INJECTION, SOLUTION INTRAVENOUS EVERY 5 MIN PRN
Status: DISCONTINUED | OUTPATIENT
Start: 2017-04-04 | End: 2017-04-04 | Stop reason: HOSPADM

## 2017-04-04 RX ORDER — LORAZEPAM 2 MG/ML
0.5 INJECTION INTRAMUSCULAR
Status: DISCONTINUED | OUTPATIENT
Start: 2017-04-04 | End: 2017-04-04 | Stop reason: HOSPADM

## 2017-04-04 RX ORDER — HYDROCODONE BITARTRATE AND ACETAMINOPHEN 5; 325 MG/1; MG/1
1-2 TABLET ORAL EVERY 4 HOURS PRN
Status: DISCONTINUED | OUTPATIENT
Start: 2017-04-04 | End: 2017-04-04 | Stop reason: HOSPADM

## 2017-04-04 RX ORDER — PRASUGREL 10 MG/1
10-60 TABLET, FILM COATED ORAL
Status: DISCONTINUED | OUTPATIENT
Start: 2017-04-04 | End: 2017-04-04 | Stop reason: HOSPADM

## 2017-04-04 RX ORDER — ACETAMINOPHEN 325 MG/1
325-650 TABLET ORAL EVERY 4 HOURS PRN
Status: DISCONTINUED | OUTPATIENT
Start: 2017-04-04 | End: 2017-04-04 | Stop reason: HOSPADM

## 2017-04-04 RX ORDER — POTASSIUM CHLORIDE 29.8 MG/ML
20 INJECTION INTRAVENOUS
Status: DISCONTINUED | OUTPATIENT
Start: 2017-04-04 | End: 2017-04-04 | Stop reason: HOSPADM

## 2017-04-04 RX ORDER — EPTIFIBATIDE 2 MG/ML
2 INJECTION, SOLUTION INTRAVENOUS CONTINUOUS PRN
Status: DISCONTINUED | OUTPATIENT
Start: 2017-04-04 | End: 2017-04-04 | Stop reason: HOSPADM

## 2017-04-04 RX ORDER — BUPIVACAINE HYDROCHLORIDE 2.5 MG/ML
1-10 INJECTION, SOLUTION EPIDURAL; INFILTRATION; INTRACAUDAL
Status: DISCONTINUED | OUTPATIENT
Start: 2017-04-04 | End: 2017-04-04 | Stop reason: HOSPADM

## 2017-04-04 RX ADMIN — HEPARIN SODIUM 900 UNITS/HR: 10000 INJECTION, SOLUTION INTRAVENOUS at 11:48

## 2017-04-04 RX ADMIN — Medication 2000 UNITS: at 12:21

## 2017-04-04 RX ADMIN — VERAPAMIL HYDROCHLORIDE 2.5 MG: 2.5 INJECTION, SOLUTION INTRAVENOUS at 12:21

## 2017-04-04 RX ADMIN — ASPIRIN 325 MG: 325 TABLET, DELAYED RELEASE ORAL at 11:37

## 2017-04-04 RX ADMIN — SODIUM CHLORIDE: 9 INJECTION, SOLUTION INTRAVENOUS at 11:30

## 2017-04-04 RX ADMIN — MIDAZOLAM HYDROCHLORIDE 0.5 MG: 1 INJECTION, SOLUTION INTRAMUSCULAR; INTRAVENOUS at 12:19

## 2017-04-04 RX ADMIN — NITROGLYCERIN 400 MCG: 5 INJECTION, SOLUTION INTRAVENOUS at 12:21

## 2017-04-04 RX ADMIN — MIDAZOLAM HYDROCHLORIDE 1 MG: 1 INJECTION, SOLUTION INTRAMUSCULAR; INTRAVENOUS at 12:13

## 2017-04-04 RX ADMIN — ASPIRIN 81 MG: 81 TABLET, COATED ORAL at 09:02

## 2017-04-04 RX ADMIN — FENTANYL CITRATE 50 MCG: 50 INJECTION, SOLUTION INTRAMUSCULAR; INTRAVENOUS at 12:13

## 2017-04-04 RX ADMIN — Medication 4600 UNITS: at 11:49

## 2017-04-04 RX ADMIN — LIDOCAINE HYDROCHLORIDE 10 MG: 10 INJECTION, SOLUTION EPIDURAL; INFILTRATION; INTRACAUDAL; PERINEURAL at 12:19

## 2017-04-04 RX ADMIN — IOPAMIDOL 85 ML: 755 INJECTION, SOLUTION INTRAVASCULAR at 12:45

## 2017-04-04 RX ADMIN — DIPHENHYDRAMINE HYDROCHLORIDE 50 MG: 25 CAPSULE ORAL at 11:37

## 2017-04-04 RX ADMIN — POTASSIUM CHLORIDE 20 MEQ: 1500 TABLET, EXTENDED RELEASE ORAL at 11:38

## 2017-04-04 RX ADMIN — NITROGLYCERIN 0.4 MG: 0.4 TABLET SUBLINGUAL at 08:50

## 2017-04-04 ASSESSMENT — PAIN DESCRIPTION - DESCRIPTORS: DESCRIPTORS: TIGHTNESS

## 2017-04-04 NOTE — PLAN OF CARE
"Problem: Goal Outcome Summary  Goal: Goal Outcome Summary  Outcome: No Change  VSS. No c/o pain; pt reports occasional \"chest pressure\" that goes away quickly. Patient advised to notify nursing if pain/presure returns. Tele SD with occ. PACs. Pt has been NPO since midnight for stress test today. Continue to monitor.      "

## 2017-04-04 NOTE — ED NOTES
[facility]  ED Nurse Handoff Report    ED Chief complaint: Chest Pain (2 months of CP, getting worse, seen by PMD today and sent here)      ED Diagnosis:   Final diagnoses:   Chest pain       Code Status: Not on file    Allergies:   Allergies   Allergen Reactions     Sumatriptan      Migraine medications       Activity level - Baseline/Home:  Independent    Activity Level - Current:   Independent     Needed?: No    Isolation: No  Infection: Not Applicable    Bariatric?: No    Vital Signs:   Vitals:    04/03/17 1929 04/03/17 1930 04/03/17 2043   BP:  (!) 165/108 153/87   Resp: 14  15   Temp: 97.7  F (36.5  C)     TempSrc: Oral     SpO2: 98%     Weight: 85.3 kg (188 lb)         Cardiac Rhythm: ,   Cardiac  Cardiac Rhythm: Normal sinus rhythm    Pain level:      Is this patient confused?: No    Patient Report: Initial Complaint: Pt has been having 3-10 minute bouts of chest pain for the last 2 months   Focused Assessment: Pt states he has chest pressure, currently denies pain or SOB.  Pt in NSR, vital signs unremarkable.      Tests Performed: BMP, CBC, Troponin, Chest x-ray.  Abnormal Results: EKG unremarkable, troponin negative, chest x-ray unremarkable.   Treatments provided: Pt given 324 chewable aspirin, 25mg po metoprolol.    Family Comments: Wife at bedside involved in cares.    OBS brochure/video discussed/provided to patient: N/A    ED Medications:   Medications   lidocaine 1 % 1 mL (not administered)   lidocaine (LMX4) cream (not administered)   sodium chloride (PF) 0.9% PF flush 3 mL (not administered)   sodium chloride (PF) 0.9% PF flush 3 mL (not administered)   aspirin chewable tablet 324 mg (324 mg Oral Given 4/3/17 1949)   metoprolol (LOPRESSOR) tablet 25 mg (25 mg Oral Given 4/3/17 2044)       Drips infusing?:  No      ED NURSE PHONE NUMBER: [phone]

## 2017-04-04 NOTE — PROGRESS NOTES
Called cath lab inquiring about CBC draw. Results from yesterday ok for today's angiogram. No need for INR as patient is not on anticoagulation.

## 2017-04-04 NOTE — H&P
[facility]    History and Physical    Date of Admission:  4/3/2017    Assessment & Plan   Benjamín Mendoza is a 57 year old male who with past medical history of chronic back pain, CARY, RLS and GERD who presented to the ER for evaluation of chest pain. EKG showed no acute ischemic changes. Troponin was negative.    Summary:    -Chest pain, atypical   -EKG with no acute ischemic changes, IRBBB   -Troponin negative, trend serial enzymes, if cardiac enzymes trend positive start IV heparin    -Cardiology consult   -Plan for stress echocardiogram given no cardiac risk factors, atypical pain, negative troponin and no acute ischemic EKG changes   -Risk factor modification with flp and hga1c   -Given  mg and Metoprolol 25 mg in ER    -GERD   -Continue PTA Prilosec    -CARY   -has not used CPAP for past 2 months   -symptoms have improved since septoplasty one year ago    -RLS  -Insomnia  -Anxiety   -Continue PTA Klonopin    -Chronic headaches   -follow    -Chronic back pain   -continue as needed percocet    DVT Prophylaxis: Low Risk/Ambulatory with no VTE prophylaxis indicated  Code Status: Full Code    Disposition: Expected discharge in 1-2 days once after cardiovascular evaluation.    Mirna Oakley PA-C  Pager [phone]     This patient was discussed with Dr. Stanley of the Hospitalist Service who agrees with current plans as outlined above.  Dr. Stanley      Primary Care Physician   Aleida Larson    Chief Complaint   Chest pain    History is obtained from the patient and chart review    History of Present Illness   Benjamín Mendoza is a 57 year old male who with past medical history of chronic back pain, CARY, RLS and GERD who presented to the ER for evaluation of chest pain.  He states that he has had ongoing chest pain for many years. However, in the past few months it has increased in frequency.  It typically occurs 1-2 times per day lasting 5-10 minutes. It is a mid sternal squeezing sensation  that is accompanied by a metallic taste in his mouth. There is no associated palpitations, diaphoresis, nausea, or shortness of breath. It occurs with both rest, exertion and while laying in bed. It most commonly occurs when driving. He is an active male who works [place] an  [place] For the past few days he has noted an increase in fatigue.    He presented to his PCP today for evaluation and was directed to the ER for further evaluation.    In the ER his EKG did not reveal any acute ischemic changes and an incomplete RBBB was present. Cardiac enzymes were negative. He was chest pain free.   He thinks he may have high cholesterol but denies issues with diabetes/pre-diabetes, hypertension or tobacco use. His mother had a cerebral aneurysm but there is no family history of coronary artery disease.    Past Medical History    I have reviewed this patient's medical history and updated it with pertinent information if needed.   Past Medical History:   Diagnosis Date     Chronic back pain     percocet, spinal fluid leak dx but never visible     Chronic headaches      Esophageal reflux      GERD (gastroesophageal reflux disease)      CARY (obstructive sleep apnea) 3/2009    AHI 26 Ndir 84%     RLS (restless legs syndrome)        Past Surgical History   I have reviewed this patient's surgical history and updated it with pertinent information if needed.  Past Surgical History:   Procedure Laterality Date     COLONOSCOPY  06/12/07     HC TRABECULOPLASTY BY LASER SURGERY  2000    LASIK     SEPTOPLASTY, TURBINOPLASTY, COMBINED N/A 4/26/2016    Procedure: COMBINED SEPTOPLASTY, TURBINOPLASTY;  Surgeon: Ken Molina MD;  Location:  OR     [facility]         Prior to Admission Medications   Prior to Admission Medications   Prescriptions Last Dose Informant Patient Reported? Taking?   IBUPROFEN PO 4/3/2017 at am  Yes Yes   Sig: Take 800 mg by mouth 2 times daily as needed    Multiple Vitamin  (ONE-A-DAY MENS PO) 2017 at pm  Yes Yes   Sig: Take 1 tablet by mouth every evening    ORDER FOR DME   Yes No   Si Device Auto CPAP @@ 5-15 cm with heated humidifier and heated tube via mask of choice per order of Melody Orourke PA-C.   clonazePAM (KLONOPIN) 2 MG tablet 2017 at hs  Yes Yes   Sig: Take 2 mg by mouth At Bedtime Reported on 2017    metaxalone (SKELAXIN) 800 MG tablet 2017 at pm  Yes Yes   Sig: Take 800 mg by mouth every evening   omeprazole (PRILOSEC) 20 MG CR capsule 2017 at pm  No Yes   Sig: TAKE 1 CAPSULE DAILY   oxyCODONE-acetaminophen (PERCOCET) 5-325 MG per tablet prn  No Yes   Sig: Take 1-2 tablets by mouth every 4 hours as needed for pain (moderate to severe)      Facility-Administered Medications: None     Allergies   Allergies   Allergen Reactions     Sumatriptan      Migraine medications       Social History   I have reviewed this patient's social history and updated it with pertinent information if needed. Benjamín Mendoza  reports that he has never smoked. He has never used smokeless tobacco. He reports that he does not drink alcohol or use illicit drugs.    Family History   I have reviewed this patient's family history and updated it with pertinent information if needed.   Family History   Problem Relation Age of Onset     CEREBROVASCULAR DISEASE Mother 66     aneursym -  at 66     Other - See Comments Sister      Half sister, heart problems unsure what kind     Other - See Comments Brother      Was in a fire     Arthritis Maternal Aunt      Arthritis Maternal Uncle      Muscular Disorder Daughter 18     Small fiber neuropathy     Hypertension No family hx of      DIABETES No family hx of        Review of Systems   The 10 point Review of Systems is negative other than noted in the HPI or here.     Physical Exam   Temp: 97.7  F (36.5  C) Temp src: Oral BP: 153/87   Heart Rate: 70 Resp: 15 SpO2: 98 % O2 Device: None (Room air)    Vital Signs with Ranges  Temp:  [97.7   F (36.5  C)-98  F (36.7  C)] 97.7  F (36.5  C)  Pulse:  [84] 84  Heart Rate:  [70] 70  Resp:  [14-15] 15  BP: (132-165)/() 153/87  SpO2:  [97 %-98 %] 98 %  191 lbs 6.4 oz    Constitutional: Alert and oriented x3, no acute distress  Eyes: PERRL  HEENT: patent nares, supple neck, MMM, no jvd   Respiratory: clear to auscultation  Cardiovascular: regular rate and rhythm, no murmurs, no edema, DP +2, no reproducible chest wall tenderness  GI: soft, non tender, non distended, bowel sounds present  Lymph/Hematologic: no evidence of jaundice or bruising  Genitourinary: deferred  Skin: warm and dry, no rashes  Musculoskeletal: able to move all extremities  Neurologic: no focal neurologic deficits, gait not examined  Psychiatric: affect normal, answers questions appropriately    Data   -Data reviewed today: All pertinent laboratory and imaging results from this encounter were reviewed. I personally reviewed the EKG tracing showing NSR with no acute changes.    Recent Labs  Lab 04/03/17  1943   WBC 6.0   HGB 13.8   MCV 87   *      POTASSIUM 3.7   CHLORIDE 109   CO2 25   BUN 12   CR 0.86   ANIONGAP 10   NGHIA 8.4*   *   TROPI <0.015The 99th percentile for upper reference range is 0.045 ug/L.  Troponin values in the range of 0.045 - 0.120 ug/L may be associated with risks of adverse clinical events.       Imaging:  Recent Results (from the past 24 hour(s))   XR Chest 2 Views    Narrative    CHEST TWO VIEWS    4/3/2017 8:10 PM     HISTORY: Chest pain.    COMPARISON: 5/8/2013.    FINDINGS: Heart and lungs negative. No interval change.      Impression    IMPRESSION: Negative.

## 2017-04-04 NOTE — PROVIDER NOTIFICATION
Dr. Elliott notified of patient having chest pressure, rating 2/10, non-radiating. Getting EKG, questioning if still ok to proceed with stress echo this a.m.

## 2017-04-04 NOTE — PROGRESS NOTES
"[facility]    Hospitalist Progress Note      Assessment & Plan   Benjamín Mendoza is a 57 year old male who with past medical history of chronic back pain, CARY, RLS and GERD who presented to the ER for evaluation of chest pain. EKG showed no acute ischemic changes. Troponin was negative.     Chest Pain with concern for unstable angina  -EKG with no acute ischemic changes, IRBBB  -Trop negative  - Had additional CP this am that improved with Nitro  - Appreciate Cardiology eval, concern for unstable angina. Heparin drip, BB, statin and angio later today.    DLD  Recent Labs   Lab Test  04/04/17   0516  10/14/15   1407   CHOL  220*  217*   HDL  38*  37*   LDL  157*  141*   TRIG  127  195*   CHOLHDLRATIO   --   5.9*   - Atorvastatin 40 mg/d     -GERD  -Continue PTA Prilosec     -CARY  -has not used CPAP for past 2 months  -symptoms have improved since septoplasty one year ago     -RLS  -Insomnia  -Anxiety  -Continue PTA Klonopin      -Chronic back pain  -continue as needed percocet  DVT Prophylaxis: Heparin drip  Code Status: Full Code    Disposition: Pending work-up per Cardiology    Madelin Marx    Interval History   Had some \"chest tightness\" this am rated 2/10 that improved with Nitro. Does have some mild HA. No N/V. Discussed plan per Cardiology for angio later today.    -Data reviewed today: I reviewed all new labs and imaging results over the last 24 hours. I personally reviewed the EKG tracing showing NSR with no significant ST changes.    Physical Exam   Temp: 98  F (36.7  C) Temp src: Oral BP: 116/74   Heart Rate: 57 Resp: 14 SpO2: 99 % O2 Device: None (Room air)    Vitals:    04/03/17 2053 04/03/17 2136 04/04/17 0500   Weight: 86.8 kg (191 lb 6.4 oz) 85.7 kg (188 lb 15 oz) 84.2 kg (185 lb 10 oz)     Vital Signs with Ranges  Temp:  [97.7  F (36.5  C)-98.4  F (36.9  C)] 98  F (36.7  C)  Pulse:  [84] 84  Heart Rate:  [42-70] 57  Resp:  [12-18] 14  BP: (114-165)/() 116/74  SpO2:  [97 %-99 " %] 99 %       Constitutional: Alert, resting comfortably in NAD  Respiratory: Normal effort, symmetric expansion, no crackles or wheezing  Cardiovascular: RRR no murmurs   GI: Non distended, normal bowels sounds, no tenderness or guarding  MSK: LE without edema. Dorsalis pedis pulse palpated bilaterally.   Skin/Integumen: Clear  Neuro: CN II-XII grossly intact  Psych:  Alert and oriented x 3. Normal affect      Medications     NaCl 150 mL/hr at 04/04/17 1130     HEParin 900 Units/hr (04/04/17 1148)       metoprolol  50 mg Oral Daily     atorvastatin  40 mg Oral Daily     sodium chloride (PF)  3 mL Intracatheter Q8H     aspirin EC  325 mg Oral Daily     omeprazole  20 mg Oral Daily     metaxalone  800 mg Oral QPM     clonazePAM  2 mg Oral At Bedtime       Data     Recent Labs  Lab 04/04/17  0845 04/04/17  0140 04/03/17  1943   WBC  --   --  6.0   HGB  --   --  13.8   MCV  --   --  87   PLT  --   --  141*   NA  --   --  144   POTASSIUM  --   --  3.7   CHLORIDE  --   --  109   CO2  --   --  25   BUN  --   --  12   CR  --   --  0.86   ANIONGAP  --   --  10   NGHIA  --   --  8.4*   GLC  --   --  111*   TROPI <0.015The 99th percentile for upper reference range is 0.045 ug/L.  Troponin values in the range of 0.045 - 0.120 ug/L may be associated with risks of adverse clinical events. <0.015The 99th percentile for upper reference range is 0.045 ug/L.  Troponin values in the range of 0.045 - 0.120 ug/L may be associated with risks of adverse clinical events. <0.015The 99th percentile for upper reference range is 0.045 ug/L.  Troponin values in the range of 0.045 - 0.120 ug/L may be associated with risks of adverse clinical events.       Recent Results (from the past 24 hour(s))   XR Chest 2 Views    Narrative    CHEST TWO VIEWS    4/3/2017 8:10 PM     HISTORY: Chest pain.    COMPARISON: 5/8/2013.    FINDINGS: Heart and lungs negative. No interval change.      Impression    IMPRESSION: Negative.     DARRELL RICE MD

## 2017-04-04 NOTE — DISCHARGE SUMMARY
[facility]  Discharge Summary        Benjamín Mendoza MRN# [id number]   YOB: 1960 Age: 57 year old     Date of Admission:  4/3/2017  Date of Discharge:  4/4/2017  Admitting Physician:  Raji Stanley MD  Discharge Physician:  Madelin Mrax PA-C  Discharging Service:  Hospitalist     Primary Provider: Aledia Larson [phone]     DISCHARGE DIAGNOSES/PROBLEM ORIENTED HOSPITAL COURSE:  Benjamín Mendoza is a 56 yo male with PMH of GERD, CARY and RLS who presented to [facility] 4/3 from PCP office for evaluation of chest pain. Has had intermittent issues for the past 2 years worsening over the past few months. Occurs 1-2x day with associated metalic taste in his mouth. Work-up in the emergency department was unremarkable and patient was admitted for further observation. For additional details regarding HPI, please see H&P by Mirna Oakley PA-C    Atypical Chest Pain: Pain has been intermittent for the past 2 years, with increasing frequency over the past 2 months.   - Evaluated by Cardiology and s/p coronary angiogram 4/4 with non-obstructive coronary artery disease < 20%  - Pain not thought to be cardiac in nature.   - Has longstanding history of GERD, would benefit from GI work up including EGD as outpatient.     Non Obstructive Coronary Artery Disease  DLD  Recent Labs   Lab Test  04/04/17   0516  10/14/15   1407   CHOL  220*  217*   HDL  38*  37*   LDL  157*  141*   TRIG  127  195*   CHOLHDLRATIO   --   5.9*     - Atorvastatin 40 mg/d  - ASA 81 mg/d  - Lifestyle modificaitons    CODE STATUS:  Full Code    BRIEF HOSPITAL STAY SUMMARY (SENT HOME WITH PATIENT IN AVS):   Reason for your hospital stay       You were admitted for evaluation of chest pain. You had a complete   cardiac work-up including coronary angiogram which revealed minimal   coronary artery disease. Your chest pain is not thought to be cardiac in   nature.                    IMPORTANT RESULTS:  Angiogram  4/4:  --Unstable angina  --No angiographic evidence of obstructive coronary artery disease.  --Left ventricle with LVEDP of 9 mmHg, visually estimated LVEF of 60%,  no evidence of mitral regurgitation and no evidence of aortic  stenosis.    PLAN:   --Bedrest per protocol.  --Return to the primary inpatient team for further evaluation and  management.  --Continued medical management and lifestyle modification for  cardiovascular risk factor optimization.         PENDING RESULTS:  Unresulted Labs Ordered in the Past 30 Days of this Admission     No orders found for last 61 day(s).          DISCHARGE INSTRUCTIONS AND FOLLOW-UP:  Follow-up Appointments     Follow-up and recommended labs and tests        Follow up with primary care provider, Aleida Larson, within 7-10 days   for hospital follow- up.    Given prolonged history of GERD consider outpatient GI evaluation  +/- EGD   for further evaluation for chest pain                    DISCHARGE DISPOSITION:  Discharged to home    DISCHARGE MEDICATIONS:  Current Discharge Medication List      START taking these medications    Details   atorvastatin (LIPITOR) 40 MG tablet Take 1 tablet (40 mg) by mouth daily  Qty: 30 tablet, Refills: 1    Associated Diagnoses: Hyperlipidemia, unspecified hyperlipidemia type      aspirin EC 81 MG EC tablet Take 1 tablet (81 mg) by mouth daily  Qty: 60 tablet, Refills: 0    Associated Diagnoses: Hyperlipidemia, unspecified hyperlipidemia type         CONTINUE these medications which have NOT CHANGED    Details   metaxalone (SKELAXIN) 800 MG tablet Take 800 mg by mouth every evening      clonazePAM (KLONOPIN) 2 MG tablet Take 2 mg by mouth At Bedtime Reported on 2/27/2017       omeprazole (PRILOSEC) 20 MG CR capsule TAKE 1 CAPSULE DAILY  Qty: 90 capsule, Refills: 3    Associated Diagnoses: Gastroesophageal reflux disease without esophagitis      oxyCODONE-acetaminophen (PERCOCET) 5-325 MG per tablet Take 1-2 tablets by mouth every 4 hours as  needed for pain (moderate to severe)  Qty: 90 tablet, Refills: 0    Associated Diagnoses: Chronic midline low back pain without sciatica      Multiple Vitamin (ONE-A-DAY MENS PO) Take 1 tablet by mouth every evening       ORDER FOR DME 1 Device Auto CPAP @@ 5-15 cm with heated humidifier and heated tube via mask of choice per order of Melody Orourke PA-C.         STOP taking these medications       IBUPROFEN PO Comments:   Reason for Stopping:               ALLERGIES:  Allergies   Allergen Reactions     Sumatriptan      Migraine medications       CONSULTATIONS THIS HOSPITAL STAY:  Cardiology    CONDITION ON DISCHARGE:  Discharge Condition: Stable      DISCHARGE ORDERS FOR FACILITY:  After Care Instructions     Activity       Your activity upon discharge: activity as tolerated            Diet       Follow this diet upon discharge: Orders Placed This Encounter      Combination Diet Low Saturated                          DISCHARGE TIME:  Greater than 30 minutes.    IMAGING RESULTS FROM THIS HOSPITAL STAY:  Results for orders placed or performed during the hospital encounter of 04/03/17   XR Chest 2 Views    Narrative    CHEST TWO VIEWS    4/3/2017 8:10 PM     HISTORY: Chest pain.    COMPARISON: 5/8/2013.    FINDINGS: Heart and lungs negative. No interval change.      Impression    IMPRESSION: Negative.     DARRELL RICE MD       MOST RECENT LAB RESULTS:  Most Recent 3 CBC's:  Recent Labs   Lab Test  04/03/17   1943  04/12/16   1307  10/14/15   1407   WBC  6.0  5.6  5.4   HGB  13.8  13.5  14.3   MCV  87  87  86   PLT  141*  141*  143*      Most Recent 3 BMP's:  Recent Labs   Lab Test  04/03/17   1943  10/14/15   1407  05/08/13   0855   NA  144  137  144   POTASSIUM  3.7  3.7  4.0   CHLORIDE  109  105  107   CO2  25  25  24   BUN  12  11  10   CR  0.86  0.68  0.85   ANIONGAP  10  7  13.3   NGHIA  8.4*  9.0  9.2   GLC  111*  92  101*     Most Recent 3 Troponin's:  Recent Labs   Lab Test  04/04/17   0845  04/04/17   0140   04/03/17   1943   TROPI  <0.015  The 99th percentile for upper reference range is 0.045 ug/L.  Troponin values in   the range of 0.045 - 0.120 ug/L may be associated with risks of adverse   clinical events.    <0.015  The 99th percentile for upper reference range is 0.045 ug/L.  Troponin values in   the range of 0.045 - 0.120 ug/L may be associated with risks of adverse   clinical events.    <0.015  The 99th percentile for upper reference range is 0.045 ug/L.  Troponin values in   the range of 0.045 - 0.120 ug/L may be associated with risks of adverse   clinical events.       Most Recent 3 INR's:No lab results found.  Most Recent 2 LFT's:  Recent Labs   Lab Test  10/14/15   1407   AST  22   ALT  39   ALKPHOS  54   BILITOTAL  0.8     Most Recent Cholesterol Panel:  Recent Labs   Lab Test  04/04/17   0516   CHOL  220*   LDL  157*   HDL  38*   TRIG  127     Most Recent 6 Bacteria Isolates From Any Culture (See EPIC Reports for Culture Details):No lab results found.  Most Recent TSH, T4 and HgbA1c:   Recent Labs   Lab Test  04/04/17   0845  04/03/17   0140   TSH  1.93   --    A1C   --   5.3

## 2017-04-04 NOTE — PHARMACY-ADMISSION MEDICATION HISTORY
Admission medication history interview status for the 4/3/2017  admission is complete. See EPIC admission navigator for prior to admission medications     Medication history source reliability:Good    Actions taken by pharmacist (provider contacted, etc):None     Additional medication history information not noted on PTA med list :None    Medication reconciliation/reorder completed by provider prior to medication history? No    Time spent in this activity: 10 minutes    Prior to Admission medications    Medication Sig Last Dose Taking? Auth Provider   IBUPROFEN PO Take 800 mg by mouth 2 times daily as needed  4/3/2017 at am Yes Reported, Patient   metaxalone (SKELAXIN) 800 MG tablet Take 800 mg by mouth every evening 4/2/2017 at pm Yes Unknown, Entered By History   clonazePAM (KLONOPIN) 2 MG tablet Take 2 mg by mouth At Bedtime Reported on 2/27/2017 4/2/2017 at hs Yes Reported, Patient   omeprazole (PRILOSEC) 20 MG CR capsule TAKE 1 CAPSULE DAILY 4/2/2017 at pm Yes Aleida Larson PA-C   oxyCODONE-acetaminophen (PERCOCET) 5-325 MG per tablet Take 1-2 tablets by mouth every 4 hours as needed for pain (moderate to severe) prn Yes Aleida Larson PA-C   Multiple Vitamin (ONE-A-DAY MENS PO) Take 1 tablet by mouth every evening  4/2/2017 at pm Yes Reported, Patient   ORDER FOR DME 1 Device Auto CPAP @@ 5-15 cm with heated humidifier and heated tube via mask of choice per order of Melody Orourke PA-C.   Reported, Patient

## 2017-04-04 NOTE — CONSULTS
"Cardiology Consultation      Benjamín Mendoza MRN# [id number]   YOB: 1960 Age: 57 year old   Date of Admission: 4/3/2017     Reason for consult: CP           Assessment and Plan:   Active Problems:    * No active hospital problems. *       1. Chest discomfort    Most consistent with unstable angina    Plan coronary angiography.  Risks and benefits explained to patient and wife    Initiate BB, Heparin, Statin therapy      2. Hyperlipidemia    Initiate Lipitor 40 mg daily                     Chief Complaint:   Chest Pain (2 months of CP, getting worse, seen by PMD today and sent here)           History of Present Illness:   This patient is a 57 year old male with history of chest discomfort for years.  Progressed recently.  Severe episode causing him to sit down last week.  Chest discomfort radiates to jaw bilaterally and throat.  No associated nasuea, vomiting, diaphoresis.  No syncope or history of MI, CVA, CHF.  Non smoker.  No exercise but active for age.  Father medical history unknown.         Physical Exam:   Vitals were reviewed  Blood pressure 116/74, temperature 98  F (36.7  C), temperature source Oral, resp. rate 14, height 1.778 m (5' 10\"), weight 84.2 kg (185 lb 10 oz), SpO2 99 %.  Temperatures:  Current - Temp: 98  F (36.7  C); Max - Temp  Av  F (36.7  C)  Min: 97.7  F (36.5  C)  Max: 98.4  F (36.9  C)  Respiration range: Resp  Av.6  Min: 12  Max: 18  Pulse range: Pulse  Av  Min: 84  Max: 84  Blood pressure range: Systolic (24hrs), Av , Min:114 , Max:165   ; Diastolic (24hrs), Av, Min:61, Max:108    Pulse oximetry range: SpO2  Av.2 %  Min: 97 %  Max: 99 %  No intake or output data in the 24 hours ending 17 1038  Constitutional:   awake, alert, cooperative, no apparent distress, and appears stated age     Eyes:   Lids and lashes normal, pupils equal, round and reactive to light, extra ocular muscles intact, sclera clear, conjunctiva normal     Neck:   supple, " symmetrical, trachea midline, no JVD     Back:   symmetric     Lungs:   No increased work of breathing, good air exchange, clear to auscultation bilaterally, no crackles or wheezing  clear to auscultation     Cardiovascular:   Normal apical impulse, regular rate and rhythm, normal S1 and S2, no S3 or S4, and no murmur noted.   , , ,   carotids without bruits bilaterally     Abdomen:   non-tender     Musculoskeletal:   motor strength is 5 out of 5 all extremities bilaterally     Neurologic:   Grossly nonfocal     Skin:   no bruising or bleeding     Additional findings:   Edema   No edema               Past Medical History:   I have reviewed this patient's past medical history  Past Medical History:   Diagnosis Date     Chronic back pain     percocet, spinal fluid leak dx but never visible     Chronic headaches      Esophageal reflux      GERD (gastroesophageal reflux disease)      CARY (obstructive sleep apnea) 3/2009    AHI 26 Ndir 84%     RLS (restless legs syndrome)              Past Surgical History:   I have reviewed this patient's past surgical history  Past Surgical History:   Procedure Laterality Date     COLONOSCOPY  07     HC TRABECULOPLASTY BY LASER SURGERY      LASIK     SEPTOPLASTY, TURBINOPLASTY, COMBINED N/A 2016    Procedure: COMBINED SEPTOPLASTY, TURBINOPLASTY;  Surgeon: Ken Molina MD;  Location: [facility]                 Social History:   I have reviewed this patient's social history  Social History   Substance Use Topics     Smoking status: Never Smoker     Smokeless tobacco: Never Used      Comment: no smokers in household     Alcohol use No             Family History:   I have reviewed this patient's family history  Family History   Problem Relation Age of Onset     CEREBROVASCULAR DISEASE Mother 66     aneursym -  at 66     Other - See Comments Sister      Half sister, heart problems unsure what kind     Other - See Comments Brother      Was in a fire      Arthritis Maternal Aunt      Arthritis Maternal Uncle      Muscular Disorder Daughter 18     Small fiber neuropathy     Hypertension No family hx of      DIABETES No family hx of              Allergies:     Allergies   Allergen Reactions     Sumatriptan      Migraine medications             Medications:   I have reviewed this patient's current medications  Prescriptions Prior to Admission   Medication Sig Dispense Refill Last Dose     IBUPROFEN PO Take 800 mg by mouth 2 times daily as needed    4/3/2017 at am     metaxalone (SKELAXIN) 800 MG tablet Take 800 mg by mouth every evening   4/2/2017 at pm     clonazePAM (KLONOPIN) 2 MG tablet Take 2 mg by mouth At Bedtime Reported on 2/27/2017 4/2/2017 at hs     omeprazole (PRILOSEC) 20 MG CR capsule TAKE 1 CAPSULE DAILY 90 capsule 3 4/2/2017 at pm     oxyCODONE-acetaminophen (PERCOCET) 5-325 MG per tablet Take 1-2 tablets by mouth every 4 hours as needed for pain (moderate to severe) 90 tablet 0 prn     Multiple Vitamin (ONE-A-DAY MENS PO) Take 1 tablet by mouth every evening    4/2/2017 at pm     ORDER FOR DME 1 Device Auto CPAP @@ 5-15 cm with heated humidifier and heated tube via mask of choice per order of Melody Orourke PA-C.   Taking     Current Facility-Administered Medications Ordered in Epic   Medication Dose Route Frequency Last Rate Last Dose     metoprolol (TOPROL-XL) 24 hr tablet 50 mg  50 mg Oral Daily         atorvastatin (LIPITOR) tablet 40 mg  40 mg Oral Daily         lidocaine 1 % 1 mL  1 mL Other Q1H PRN         lidocaine (LMX4) cream   Topical Q1H PRN         sodium chloride (PF) 0.9% PF flush 3 mL  3 mL Intracatheter Q1H PRN         oxyCODONE-acetaminophen (PERCOCET) 5-325 MG per tablet 1-2 tablet  1-2 tablet Oral Q4H PRN         omeprazole (priLOSEC) CR capsule 20 mg  20 mg Oral Daily   20 mg at 04/03/17 2153     metaxalone (SKELAXIN) tablet 800 mg  800 mg Oral QPM   800 mg at 04/03/17 2224     clonazePAM (klonoPIN) tablet 2 mg  2 mg Oral At  Bedtime   2 mg at 04/03/17 2224     lidocaine 1 % 1 mL  1 mL Other Q1H PRN         lidocaine (LMX4) cream   Topical Q1H PRN         sodium chloride (PF) 0.9% PF flush 3 mL  3 mL Intracatheter Q1H PRN         sodium chloride (PF) 0.9% PF flush 3 mL  3 mL Intracatheter Q8H         aspirin EC EC tablet 81 mg  81 mg Oral Daily   81 mg at 04/04/17 0902     nitroglycerin (NITROSTAT) sublingual tablet 0.4 mg  0.4 mg Sublingual Q5 Min PRN   0.4 mg at 04/04/17 0850     alum & mag hydroxide-simethicone (MYLANTA ES/MAALOX  ES) suspension 15-30 mL  15-30 mL Oral Q4H PRN         acetaminophen (TYLENOL) tablet 650 mg  650 mg Oral Q4H PRN         acetaminophen (TYLENOL) Suppository 650 mg  650 mg Rectal Q4H PRN         naloxone (NARCAN) injection 0.1-0.4 mg  0.1-0.4 mg Intravenous Q2 Min PRN         sennosides (SENOKOT) tablet 1-2 tablet  1-2 tablet Oral BID PRN         ondansetron (ZOFRAN) injection 4 mg  4 mg Intravenous Q6H PRN        Or     ondansetron (ZOFRAN-ODT) ODT tab 4 mg  4 mg Oral Q6H PRN         No current [facility]-ordered outpatient prescriptions on file.             Review of Systems:   The 10 point Review of Systems is negative other than noted in the HPI            Data:   All laboratory data reviewed  Results for orders placed or performed during the hospital encounter of 04/03/17 (from the past 24 hour(s))   EKG 12 lead   Result Value Ref Range    Interpretation ECG Click View Image link to view waveform and result    CBC with platelets differential   Result Value Ref Range    WBC 6.0 4.0 - 11.0 10e9/L    RBC Count 4.64 4.4 - 5.9 10e12/L    Hemoglobin 13.8 13.3 - 17.7 g/dL    Hematocrit 40.5 40.0 - 53.0 %    MCV 87 78 - 100 fl    MCH 29.7 26.5 - 33.0 pg    MCHC 34.1 31.5 - 36.5 g/dL    RDW 13.2 10.0 - 15.0 %    Platelet Count 141 (L) 150 - 450 10e9/L    Diff Method Automated Method     % Neutrophils 59.2 %    % Lymphocytes 30.6 %    % Monocytes 6.3 %    % Eosinophils 3.3 %    % Basophils 0.3 %    % Immature  Granulocytes 0.3 %    Nucleated RBCs 0 0 /100    Absolute Neutrophil 3.6 1.6 - 8.3 10e9/L    Absolute Lymphocytes 1.9 0.8 - 5.3 10e9/L    Absolute Monocytes 0.4 0.0 - 1.3 10e9/L    Absolute Eosinophils 0.2 0.0 - 0.7 10e9/L    Absolute Basophils 0.0 0.0 - 0.2 10e9/L    Abs Immature Granulocytes 0.0 0 - 0.4 10e9/L    Absolute Nucleated RBC 0.0    Basic metabolic panel   Result Value Ref Range    Sodium 144 133 - 144 mmol/L    Potassium 3.7 3.4 - 5.3 mmol/L    Chloride 109 94 - 109 mmol/L    Carbon Dioxide 25 20 - 32 mmol/L    Anion Gap 10 3 - 14 mmol/L    Glucose 111 (H) 70 - 99 mg/dL    Urea Nitrogen 12 7 - 30 mg/dL    Creatinine 0.86 0.66 - 1.25 mg/dL    GFR Estimate >90  Non  GFR Calc   >60 mL/min/1.7m2    GFR Estimate If Black >90   GFR Calc   >60 mL/min/1.7m2    Calcium 8.4 (L) 8.5 - 10.1 mg/dL   Troponin I   Result Value Ref Range    Troponin I ES  0.000 - 0.045 ug/L     <0.015  The 99th percentile for upper reference range is 0.045 ug/L.  Troponin values in   the range of 0.045 - 0.120 ug/L may be associated with risks of adverse   clinical events.     XR Chest 2 Views    Narrative    CHEST TWO VIEWS    4/3/2017 8:10 PM     HISTORY: Chest pain.    COMPARISON: 5/8/2013.    FINDINGS: Heart and lungs negative. No interval change.      Impression    IMPRESSION: Negative.     DARRELL RICE MD   Troponin I - Now then in 4 hours x 2    Result Value Ref Range    Troponin I ES  0.000 - 0.045 ug/L     <0.015  The 99th percentile for upper reference range is 0.045 ug/L.  Troponin values in   the range of 0.045 - 0.120 ug/L may be associated with risks of adverse   clinical events.     Lipid panel reflex to direct LDL   Result Value Ref Range    Cholesterol 220 (H) <200 mg/dL    Triglycerides 127 <150 mg/dL    HDL Cholesterol 38 (L) >39 mg/dL    LDL Cholesterol Calculated 157 (H) <100 mg/dL    Non HDL Cholesterol 182 (H) <130 mg/dL   EKG 12-lead, tracing only   Result Value Ref Range     Interpretation ECG Click View Image link to view waveform and result    Troponin I   Result Value Ref Range    Troponin I ES  0.000 - 0.045 ug/L     <0.015  The 99th percentile for upper reference range is 0.045 ug/L.  Troponin values in   the range of 0.045 - 0.120 ug/L may be associated with risks of adverse   clinical events.       EKG results:   I have reviewed this patient's EKG with the following interpretation:        Normal sinus rhythm, normal axis, no acute ischemic ST segment or T wave changes

## 2017-04-04 NOTE — PROVIDER NOTIFICATION
Contacted  re: benadryl order. Give benadryl prior to procedure. Also notified MD of heart rate in upper 40's and metoprolol with no hold parameters. Ok to hold metoprolol since patient received dose of metoprolol last night in ED.

## 2017-04-04 NOTE — ED PROVIDER NOTES
History     Chief Complaint:  Chest Pain       HPI   Benjamín Mendoza is a 57 year old male who presents for evaluation of chest pain. The patient has had intermittent episodes of chest pain for years, however these episodes have been more severe within the last few months with associated fatigue. Today, the patient wanted to be worked up for his worsening episodes of chest pain and presented to his PCP who directed him to the ED. Here, the patient mentions that these episodes normally last 3-10 minutes and do not seem to be induced by anything in particular, occurring both during activity and rest. The patient has no known family history of heart problems, but notes that he does not know his father. Of note, the patient was recently on Amoxicillin for an URI, which has since subsided.     Cardiac Risk Factors:  CAD:    Neg  Hypertension:   Neg  Hyperlipidemia:  Neg  Diabetes:   Neg  Tobacco use:   Neg  Gender:   M  Age:    57  Familial Hx of CAD:  Pos     Allergies:  Sumatriptan     Medications:    Ibuprofen   Clonazepam   Omeprazole (prilosec) 20 mg cr capsule  Metaxalone (skelaxin) 800 mg tablet  Oxycodone-acetaminophen (percocet) 5-325 mg per tablet  Multiple vitamin (one-a-day mens po)    Past Medical History:    Chronic back pain   Chronic headaches   Esophageal reflux   GERD (gastroesophageal reflux disease)   Headache  CARY (obstructive sleep apnea)   RLS (restless legs syndrome)  Insomnia     Past Surgical History:    Colonoscopy  Lasik surgery  Septoplasty, turbinoplasty, combined  [place] [facility]    Family History:    Cerebrovascular disease, aneurism  Heart Problems, half sister  Arthritis   Muscular disorder, small fiber neuropathy  HTN  Diabetes    Social History:  Relationship status:   Tobacco use: Neg  Alcohol use: Neg  The patient presents with his wife.      Review of Systems   Constitutional: Positive for fatigue.   Respiratory: Negative for cough.    Cardiovascular: Positive for chest  pain.   All other systems reviewed and are negative.    Physical Exam   First Vitals:  BP: 153/87  Temp: 97.7  F (36.5  C)  Temp src: Oral  Resp: 15  SpO2: 98 %  Weight: 85.3 kg (188 lb) (04/03 1929-04/03 2043)   Physical Exam     Constitutional: The patient is oriented to person, place, and time.   HENT:   Head: Atraumatic  Right Ear: Normal  Left Ear: Normal  Nose: Nose normal.   Mouth/Throat: Oropharynx is clear and moist. No erythema or exudate.   Eyes: Conjunctivae and EOM are normal. Pupils are equal, round, and reactive to light. No discharge  Neck: Normal range of motion. Neck supple.   Cardiovascular: Normal rate, regular rhythm, no murmur gallops or rubs. Intact distal pulses.    Pulmonary/Chest: CTA bilaterally. No wheezes rale or rhonchi.  Abdominal: Soft. Non tender.  No masses   Musculoskeletal: No edema. No bony deformity. Normal range of motion  Lymphadenopathy:     The patient has no cervical adenopathy.   Neurological: The patient is alert and oriented to person, place, and time. The patient has normal strength and normal reflexes. No cranial nerve deficit. Coordination normal.  Skin: Skin is warm and dry. No rash noted. The patient is not diaphoretic.   Psychiatric: The patient has a normal mood and affect.     Emergency Department Course   ECG (19:28:03):  Indication: chest pain.  Rate 73 bpm. AZ interval 180. QRS duration 108. QT/QTc 388/427. P-R-T axes -17.   Interpretation: Normal sinus rhythm with sinus arrhythmia, incomplete right bundle branch block, borderline ECG  Agree with computer interpretation.   No significant change compared to EKG dated 4/12/16.   Interpreted at 1931 by Dr. Barnes.      Imaging:  Chest XR, per radiology:  Negative     Radiographic findings were communicated with the patient who voiced understanding of the findings.    Laboratory:  CBC: WBC 6.0, HGB 13.8,  (L))   BMP: Glucose 111 (H), Calcium 8.4 (L), o/w WNL (Creatinine 0.86)   1943: Troponin I: <0.015      Interventions:  1949: Aspirin, 324 mg, PO  2044: Lopressor, 25 mg, PO    Emergency Department Course:  Nursing notes and vitals reviewed.  I performed an exam of the patient as documented above.  The above workup was undertaken.  2034: I discussed the patient with Dr. Garza of Cardiology who thought it was reasonable to admit the patient.   2050: I discussed the patient with Dr. Stanley of the hospitalist service who agrees to admit the patient to the hospital.   2100: I rechecked the patient and discussed results.    Findings and plan explained to the Patient who consents to admission. Discussed the patient with Dr. Stanley, who will admit the patient to a cardiac telemetry bed for further monitoring, evaluation, and treatment.      Impression & Plan    Medical Decision Making:  Benjamín Mendoza is a 57 year old male who presents with left sided chest pain with radiation into the jaw and shortness of breath. He has actually been having these symptoms on and off for the last couple of years, but it has been more frequent for the last couple of months. He did present to his primary clinic today. They were concerned for unstable angina and apparently spoke with Dr. Trinidad from cardiology who recommended transfer to [facility] for coronary angiogram. On arrival here, patient is asymptomatic. His ECG shows no signs of ischemia or arrhythmia. Inital troponin is negative and the remainder of his laboratory examination is normal. I spoke with Dr. Garza of Cardiology to discuss the case with him. He thought it was reasonable to admit the patient overnight on Heparin and oral beta blockers with probably angiogram tomorrow. However, the patient was not started on Heparin per hospitalist. He had already received 324 mg of Aspirin and was given 25 mg of oral Metoprolol. I spoke with Dr. Stanley on for the hospital and the patient will be admitted to the [facility] for further observation.       Diagnosis:    ICD-10-CM   1.  Chest pain R07.9       Disposition:  Admitted to a cardiac telemetry bed under the care of Dr. Stanley of the hospitalist service.     I, López Soriano, am serving as a scribe on 4/3/2017 at 7:28 PM to personally document services performed by Jose A Barnes MD, based on my observations and the provider's statements to me.      EMERGENCY DEPARTMENT       Jose A Barnes MD  04/03/17 2410

## 2017-04-05 ENCOUNTER — TELEPHONE (OUTPATIENT)
Dept: FAMILY MEDICINE | Facility: CLINIC | Age: 57
End: 2017-04-05

## 2017-04-05 ENCOUNTER — TELEPHONE (OUTPATIENT)
Dept: FAMILY MEDICINE | Facility: OTHER | Age: 57
End: 2017-04-05

## 2017-04-05 NOTE — PLAN OF CARE
Problem: Individualization  Goal: Patient Preferences  Outcome: Adequate for Discharge Date Met:  04/04/17  Pt alert, oriented. No complaints of chest tightness after angiogram procedure this afternoon. Right radial site with small hematoma-pressure held and area softened. Area tender to deep palpation. No bleeding. Pt given discharge instructions on arm restrictions and when to call MD if needed. Pt verbalized understanding. Wife provided transportation home. Pt verbalized understanding of follow-up with primary and new medication orders.

## 2017-04-05 NOTE — TELEPHONE ENCOUNTER
Reason for follow up call: Benjamín Mendoza appeared on our list for being seen in and recenlty discharge from the Hospital.    Chief Complaint   Patient presents with     Hospital F/U     Chest Pain, Hyperlipidemia       Encounter routed for Clinic RN to call for follow up      ED / Discharge Outreach Protocol    Patient Contact    Attempt # 1    Was call answered?  No.  Left message on voicemail with information to call me back.    Ravin Ellis, RN

## 2017-04-05 NOTE — TELEPHONE ENCOUNTER
Per RF- ok to squeeze in next week.  Called Benjamín and set him up for 04/12/17.  Reina Villatoro, [facility]

## 2017-04-05 NOTE — TELEPHONE ENCOUNTER
Reason for Call:  Same Day Appointment, Requested Provider:  Holger Chappell M.D.    PCP: Aleida Larson    Reason for visit: work in-patient states he needs to be seen within 7 days because he had an angiogram done and nothing was found. Instructed to be seen by Dr. Chappell again     Duration of symptoms:     Have you been treated for this in the past? Yes    Additional comments:     Can we leave a detailed message on this number? YES    Phone number patient can be reached at: Home number on file [phone] (home)    Best Time: any    Call taken on 4/5/2017 at 1:20 PM by Aurelia Davies

## 2017-04-07 LAB — INTERPRETATION ECG - MUSE: NORMAL

## 2017-04-12 ENCOUNTER — OFFICE VISIT (OUTPATIENT)
Dept: FAMILY MEDICINE | Facility: CLINIC | Age: 57
End: 2017-04-12
Payer: COMMERCIAL

## 2017-04-12 VITALS
RESPIRATION RATE: 12 BRPM | BODY MASS INDEX: 27.05 KG/M2 | DIASTOLIC BLOOD PRESSURE: 70 MMHG | HEART RATE: 64 BPM | WEIGHT: 188.5 LBS | SYSTOLIC BLOOD PRESSURE: 110 MMHG | TEMPERATURE: 97.8 F | OXYGEN SATURATION: 100 %

## 2017-04-12 DIAGNOSIS — Z11.59 NEED FOR HEPATITIS C SCREENING TEST: Primary | ICD-10-CM

## 2017-04-12 DIAGNOSIS — K21.9 GASTROESOPHAGEAL REFLUX DISEASE WITHOUT ESOPHAGITIS: ICD-10-CM

## 2017-04-12 DIAGNOSIS — L03.113 CELLULITIS OF RIGHT UPPER EXTREMITY: ICD-10-CM

## 2017-04-12 DIAGNOSIS — G47.33 OSA (OBSTRUCTIVE SLEEP APNEA): ICD-10-CM

## 2017-04-12 PROCEDURE — 99214 OFFICE O/P EST MOD 30 MIN: CPT | Performed by: FAMILY MEDICINE

## 2017-04-12 RX ORDER — CEPHALEXIN 500 MG/1
500 CAPSULE ORAL 4 TIMES DAILY
Qty: 21 CAPSULE | Refills: 0 | Status: SHIPPED | OUTPATIENT
Start: 2017-04-12 | End: 2017-05-23

## 2017-04-12 RX ORDER — SUCRALFATE 1 G/1
1 TABLET ORAL 4 TIMES DAILY
Qty: 120 TABLET | Refills: 1 | Status: SHIPPED | OUTPATIENT
Start: 2017-04-12 | End: 2017-05-23

## 2017-04-12 RX ORDER — SUCRALFATE 1 G/1
1 TABLET ORAL 4 TIMES DAILY
Qty: 120 TABLET | Refills: 1 | Status: SHIPPED | OUTPATIENT
Start: 2017-04-12 | End: 2017-04-12

## 2017-04-12 RX ORDER — CEPHALEXIN 500 MG/1
500 CAPSULE ORAL 4 TIMES DAILY
Qty: 21 CAPSULE | Refills: 0 | Status: SHIPPED | OUTPATIENT
Start: 2017-04-12 | End: 2017-04-12

## 2017-04-12 ASSESSMENT — PAIN SCALES - GENERAL: PAINLEVEL: MILD PAIN (2)

## 2017-04-12 NOTE — PROGRESS NOTES
SUBJECTIVE:                                                    Benjamín Mendoza is a 57 year old male who presents to clinic today for the following health issues:          Hospital Follow-up Visit:    Hospital/Nursing Home/IP Rehab Facility: [facility]  Date of Admission: 04/03/17  Date of Discharge: 04/04/17  Reason(s) for Admission: chest pressure, shortness of breath, fatigue            Problems taking medications regularly:  None       Medication changes since discharge: atorvastatin, baby aspirin       Problems adhering to non-medication therapy:  None    Summary of hospitalization:  [facility] discharge summary reviewed  Diagnostic Tests/Treatments reviewed.  Follow up needed: none  Other Healthcare Providers Involved in Patient s Care:         None  Update since discharge: improved.     Post Discharge Medication Reconciliation: discharge medications reconciled, continue medications without change.  Plan of care communicated with patient     :                              Problem list and histories reviewed & adjusted, as indicated.  Additional history: as documented    Current Outpatient Prescriptions   Medication Sig Dispense Refill     omeprazole (PRILOSEC) 20 MG CR capsule Take 2 capsules (40 mg) by mouth daily 180 capsule 3     sucralfate (CARAFATE) 1 GM tablet Take 1 tablet (1 g) by mouth 4 times daily 120 tablet 1     cephALEXin (KEFLEX) 500 MG capsule Take 1 capsule (500 mg) by mouth 4 times daily 21 capsule 0     atorvastatin (LIPITOR) 40 MG tablet Take 1 tablet (40 mg) by mouth daily 30 tablet 1     metaxalone (SKELAXIN) 800 MG tablet Take 800 mg by mouth every evening       clonazePAM (KLONOPIN) 2 MG tablet Take 2 mg by mouth At Bedtime Reported on 2/27/2017        ORDER FOR DME 1 Device Auto CPAP @@ 5-15 cm with heated humidifier and heated tube via mask of choice per order of Melody Orourke PA-C.       Multiple Vitamin (ONE-A-DAY MENS PO) Take 1 tablet by mouth every evening         aspirin EC 81 MG EC tablet Take 1 tablet (81 mg) by mouth daily (Patient not taking: Reported on 4/12/2017) 60 tablet 0     oxyCODONE-acetaminophen (PERCOCET) 5-325 MG per tablet Take 1-2 tablets by mouth every 4 hours as needed for pain (moderate to severe) (Patient not taking: Reported on 4/12/2017) 90 tablet 0       Reviewed and updated as needed this visit by clinical staff       Reviewed and updated as needed this visit by Provider         ROS:  Constitutional, HEENT, cardiovascular, pulmonary, gi and gu systems are negative, except as otherwise noted. Red area around vascular access area R arm     OBJECTIVE:                                                    /70 (BP Location: Left arm, Patient Position: Chair, Cuff Size: Adult Regular)  Pulse 64  Temp 97.8  F (36.6  C) (Tympanic)  Resp 12  Wt 188 lb 8 oz (85.5 kg)  SpO2 100%  BMI 27.05 kg/m2  Body mass index is 27.05 kg/(m^2).  GENERAL: healthy, alert and no distress  SKIN: no suspicious lesions or rashes and erythematous area 4 x 4 cm R forearm no induration          ASSESSMENT/PLAN:                                                            2. Gastroesophageal reflux disease without esophagitis  Pressure may be GI in nature we will get aggressive with this and see if his symptoms improve  - omeprazole (PRILOSEC) 20 MG CR capsule; Take 2 capsules (40 mg) by mouth daily  Dispense: 180 capsule; Refill: 3  - sucralfate (CARAFATE) 1 GM tablet; Take 1 tablet (1 g) by mouth 4 times daily  Dispense: 120 tablet; Refill: 1    3. Cellulitis of right upper extremity  Hot packs TID  - cephALEXin (KEFLEX) 500 MG capsule; Take 1 capsule (500 mg) by mouth 4 times daily  Dispense: 21 capsule; Refill: 0    4. CARY (obstructive sleep apnea)  Another sleep study is in order   - SLEEP EVALUATION & MANAGEMENT REFERRAL - ADULT; Future    Follow up with me one month   Holger Chappell MD  [facility]

## 2017-04-12 NOTE — MR AVS SNAPSHOT
After Visit Summary   4/12/2017    Benjamín Mendoza    MRN: [id number]           Patient Information     Date Of Birth          1960        Visit Information        Provider Department      4/12/2017 12:50 PM Holger Chappell MD [facility]        Today's Diagnoses     Need for hepatitis C screening test    -  1    Gastroesophageal reflux disease without esophagitis        Cellulitis of right upper extremity        CARY (obstructive sleep apnea)           Follow-ups after your visit        Additional Services     SLEEP EVALUATION & MANAGEMENT REFERRAL - ADULT       Please be aware that coverage of these services is subject to the terms and limitations of your health insurance plan.  Call member services at your health plan with any benefit or coverage questions.      Please bring the following to your appointment:    >>   List of current medications   >>   This referral request   >>   Any documents/labs given to you for this referral    [facility]  Ph [phone]  (Age 13 if over 100 lbs and up)                  Future tests that were ordered for you today     Open Future Orders        Priority Expected Expires Ordered    SLEEP EVALUATION & MANAGEMENT REFERRAL - ADULT Routine  4/12/2018 4/12/2017            Who to contact     If you have questions or need follow up information about today's clinic visit or your schedule please contact [facility] directly at [phone].  Normal or non-critical lab and imaging results will be communicated to you by MyChart, letter or phone within 4 business days after the clinic has received the results. If you do not hear from us within 7 days, please contact the clinic through MyChart or phone. If you have a critical or abnormal lab result, we will notify you by phone as soon as possible.  Submit refill requests through "[employer] or call your pharmacy and they will forward the refill request to us. Please allow 3  "business days for your refill to be completed.          Additional Information About Your Visit        [employer] Information     [facility] lets you send messages to your doctor, view your test results, renew your prescriptions, schedule appointments and more. To sign up, go to [email or web address] . Click on \"Log in\" on the left side of the screen, which will take you to the Welcome page. Then click on \"Sign up Now\" on the right side of the page.     You will be asked to enter the access code listed below, as well as some personal information. Please follow the directions to create your username and password.     Your access code is: [email or web address]  Expires: 2017  7:06 PM     Your access code will  in 90 days. If you need help or a new code, please call your [place] clinic or [phone].        Care EveryWhere ID     This is your Care EveryWhere ID. This could be used by other organizations to access your [place] medical records  [id number]        Your Vitals Were     Pulse Temperature Respirations Pulse Oximetry BMI (Body Mass Index)       64 97.8  F (36.6  C) (Tympanic) 12 100% 27.05 kg/m2        Blood Pressure from Last 3 Encounters:   17 110/70   17 99/59   17 132/78    Weight from Last 3 Encounters:   17 188 lb 8 oz (85.5 kg)   17 185 lb 10 oz (84.2 kg)   17 188 lb 12.8 oz (85.6 kg)                 Today's Medication Changes          These changes are accurate as of: 17  2:16 PM.  If you have any questions, ask your nurse or doctor.               Start taking these medicines.        Dose/Directions    cephALEXin 500 MG capsule   Commonly known as:  KEFLEX   Used for:  Cellulitis of right upper extremity   Started by:  Holger Chappell MD        Dose:  500 mg   Take 1 capsule (500 mg) by mouth 4 times daily   Quantity:  21 capsule   Refills:  0       sucralfate 1 GM tablet   Commonly known as:  CARAFATE   Used for:  Gastroesophageal reflux disease without " esophagitis   Started by:  Holger Chappell MD        Dose:  1 g   Take 1 tablet (1 g) by mouth 4 times daily   Quantity:  120 tablet   Refills:  1         These medicines have changed or have updated prescriptions.        Dose/Directions    omeprazole 20 MG CR capsule   Commonly known as:  priLOSEC   This may have changed:  See the new instructions.   Used for:  Gastroesophageal reflux disease without esophagitis   Changed by:  Holger Chappell MD        Dose:  40 mg   Take 2 capsules (40 mg) by mouth daily   Quantity:  180 capsule   Refills:  3            Where to get your medicines      These medications were sent to [facility] - [address] [address]     Phone:  [phone]     omeprazole 20 MG CR capsule         These medications were sent to [facility] - [address]   [address] [place] [address]     Phone:  [phone]     cephALEXin 500 MG capsule    sucralfate 1 GM tablet                Primary Care Provider Office Phone # Fax #    Aleida Larson PA-C [phone]       [facility] [address] [place] [address]        Thank you!     Thank you for choosing [facility]  for your care. Our goal is always to provide you with excellent care. Hearing back from our patients is one way we can continue to improve our services. Please take a few minutes to complete the written survey that you may receive in the mail after your visit with us. Thank you!             Your Updated Medication List - Protect others around you: Learn how to safely use, store and throw away your medicines at www.disposemymeds.org.          This list is accurate as of: 4/12/17  2:16 PM.  Always use your most recent med list.                   Brand Name Dispense Instructions for use    aspirin EC 81 MG EC tablet     60 tablet    Take 1 tablet (81 mg) by mouth  daily       atorvastatin 40 MG tablet    LIPITOR    30 tablet    Take 1 tablet (40 mg) by mouth daily       cephALEXin 500 MG capsule    KEFLEX    21 capsule    Take 1 capsule (500 mg) by mouth 4 times daily       klonoPIN 2 MG tablet   Generic drug:  clonazePAM      Take 2 mg by mouth At Bedtime Reported on 2/27/2017       metaxalone 800 MG tablet    SKELAXIN     Take 800 mg by mouth every evening       omeprazole 20 MG CR capsule    priLOSEC    180 capsule    Take 2 capsules (40 mg) by mouth daily       ONE-A-DAY MENS PO      Take 1 tablet by mouth every evening       order for DME      1 Device Auto CPAP @@ 5-15 cm with heated humidifier and heated tube via mask of choice per order of Melody Orourke PA-C.       oxyCODONE-acetaminophen 5-325 MG per tablet    PERCOCET    90 tablet    Take 1-2 tablets by mouth every 4 hours as needed for pain (moderate to severe)       sucralfate 1 GM tablet    CARAFATE    120 tablet    Take 1 tablet (1 g) by mouth 4 times daily

## 2017-04-20 ENCOUNTER — OFFICE VISIT (OUTPATIENT)
Dept: SLEEP MEDICINE | Facility: CLINIC | Age: 57
End: 2017-04-20
Attending: FAMILY MEDICINE
Payer: COMMERCIAL

## 2017-04-20 VITALS — BODY MASS INDEX: 26.92 KG/M2 | WEIGHT: 188 LBS | HEIGHT: 70 IN

## 2017-04-20 DIAGNOSIS — G47.19 EXCESSIVE DAYTIME SLEEPINESS: Primary | ICD-10-CM

## 2017-04-20 DIAGNOSIS — G47.33 OSA (OBSTRUCTIVE SLEEP APNEA): ICD-10-CM

## 2017-04-20 PROCEDURE — 99213 OFFICE O/P EST LOW 20 MIN: CPT | Performed by: OTOLARYNGOLOGY

## 2017-04-20 NOTE — MR AVS SNAPSHOT
After Visit Summary   4/20/2017    Benjamín Mendoza    MRN: [id number]           Patient Information     Date Of Birth          1960        Visit Information        Provider Department      4/20/2017 4:30 PM Ken Molina MD [place] [facility]        Today's Diagnoses     Excessive daytime sleepiness    -  1    CARY (obstructive sleep apnea)          Care Instructions      Your BMI is Body mass index is 26.98 kg/(m^2).  Weight management is a personal decision.  If you are interested in exploring weight loss strategies, the following discussion covers the approaches that may be successful. Body mass index (BMI) is one way to tell whether you are at a healthy weight, overweight, or obese. It measures your weight in relation to your height.  A BMI of 18.5 to 24.9 is in the healthy range. A person with a BMI of 25 to 29.9 is considered overweight, and someone with a BMI of 30 or greater is considered obese. More than two-thirds of American adults are considered overweight or obese.  Being overweight or obese increases the risk for further weight gain. Excess weight may lead to heart disease and diabetes.  Creating and following plans for healthy eating and physical activity may help you improve your health.  Weight control is part of healthy lifestyle and includes exercise, emotional health, and healthy eating habits. Careful eating habits lifelong are the mainstay of weight control. Though there are significant health benefits from weight loss, long-term weight loss with diet alone may be very difficult to achieve- studies show long-term success with dietary management in less than 10% of people. Attaining a healthy weight may be especially difficult to achieve in those with severe obesity. In some cases, medications, devices and surgical management might be considered.  What can you do?  If you are overweight or obese and are interested in methods for weight loss, you should discuss  this with your provider.     Consider reducing daily calorie intake by 500 calories.     Keep a food journal.     Avoiding skipping meals, consider cutting portions instead.    Diet combined with exercise helps maintain muscle while optimizing fat loss. Strength training is particularly important for building and maintaining muscle mass. Exercise helps reduce stress, increase energy, and improves fitness. Increasing exercise without diet control, however, may not burn enough calories to loose weight.       Start walking three days a week 10-20 minutes at a time    Work towards walking thirty minutes five days a week     Eventually, increase the speed of your walking for 1-2 minutes at time    In addition, we recommend that you review healthy lifestyles and methods for weight loss available through the National Institutes of Health patient information sites:  http://win.niddk.nih.gov/publications/index.htm    And look into health and wellness programs that may be available through your health insurance provider, employer, local community center, or sincere club.                Follow-ups after your visit        Your next 10 appointments already scheduled     Apr 26, 2017  2:30 PM CDT   HST  with PH BED 3   [facility] ([address] [address] [phone]            Apr 27, 2017  2:30 PM CDT   HST Drop Off with PH BED 3   [facility] ([address] [address] [phone]              Future tests that were ordered for you today     Open Future Orders        Priority Expected Expires Ordered    HST-Home Sleep Apnea Test Routine  10/20/2017 4/20/2017            Who to contact     If you have questions or need follow up information about today's clinic visit or your schedule please contact [facility] directly at [phone].  Normal or  "non-critical lab and imaging results will be communicated to you by MyChart, letter or phone within 4 business days after the clinic has received the results. If you do not hear from us within 7 days, please contact the clinic through [employer] or phone. If you have a critical or abnormal lab result, we will notify you by phone as soon as possible.  Submit refill requests through [employer] or call your pharmacy and they will forward the refill request to us. Please allow 3 business days for your refill to be completed.          Additional Information About Your Visit        [employer] Information     [employer] lets you send messages to your doctor, view your test results, renew your prescriptions, schedule appointments and more. To sign up, go to [email or web address] . Click on \"Log in\" on the left side of the screen, which will take you to the Welcome page. Then click on \"Sign up Now\" on the right side of the page.     You will be asked to enter the access code listed below, as well as some personal information. Please follow the directions to create your username and password.     Your access code is: [email or web address]  Expires: 2017  7:06 PM     Your access code will  in 90 days. If you need help or a new code, please call your [place] clinic or [phone].        Care EveryWhere ID     This is your Care EveryWhere ID. This could be used by other organizations to access your [place] medical records  [id number]        Your Vitals Were     Height BMI (Body Mass Index)                1.778 m (5' 10\") 26.98 kg/m2           Blood Pressure from Last 3 Encounters:   17 110/70   17 99/59   17 132/78    Weight from Last 3 Encounters:   17 85.3 kg (188 lb)   17 85.5 kg (188 lb 8 oz)   17 84.2 kg (185 lb 10 oz)              We Performed the Following     SLEEP EVALUATION & MANAGEMENT REFERRAL - ADULT        Primary Care Provider Office Phone # Fax #    Aleida Larson PA-C " [phone]       [facility] [address] [place] [address]        Thank you!     Thank you for choosing [facility]  for your care. Our goal is always to provide you with excellent care. Hearing back from our patients is one way we can continue to improve our services. Please take a few minutes to complete the written survey that you may receive in the mail after your visit with us. Thank you!             Your Updated Medication List - Protect others around you: Learn how to safely use, store and throw away your medicines at www.disposemymeds.org.          This list is accurate as of: 4/20/17  4:59 PM.  Always use your most recent med list.                   Brand Name Dispense Instructions for use    aspirin EC 81 MG EC tablet     60 tablet    Take 1 tablet (81 mg) by mouth daily       atorvastatin 40 MG tablet    LIPITOR    30 tablet    Take 1 tablet (40 mg) by mouth daily       cephALEXin 500 MG capsule    KEFLEX    21 capsule    Take 1 capsule (500 mg) by mouth 4 times daily       klonoPIN 2 MG tablet   Generic drug:  clonazePAM      Take 2 mg by mouth At Bedtime Reported on 2/27/2017       metaxalone 800 MG tablet    SKELAXIN     Take 800 mg by mouth every evening       omeprazole 20 MG CR capsule    priLOSEC    180 capsule    Take 2 capsules (40 mg) by mouth daily       ONE-A-DAY MENS PO      Take 1 tablet by mouth every evening       order for DME      1 Device Auto CPAP @@ 5-15 cm with heated humidifier and heated tube via mask of choice per order of Melody Orourke PA-C.       oxyCODONE-acetaminophen 5-325 MG per tablet    PERCOCET    90 tablet    Take 1-2 tablets by mouth every 4 hours as needed for pain (moderate to severe)       sucralfate 1 GM tablet    CARAFATE    120 tablet    Take 1 tablet (1 g) by mouth 4 times daily

## 2017-04-20 NOTE — NURSING NOTE
"Chief Complaint   Patient presents with     Sleep Problem     brad       Initial Ht 1.778 m (5' 10\")  Wt 85.3 kg (188 lb)  BMI 26.98 kg/m2 Estimated body mass index is 26.98 kg/(m^2) as calculated from the following:    Height as of this encounter: 1.778 m (5' 10\").    Weight as of this encounter: 85.3 kg (188 lb).  Medication Reconciliation: complete    "

## 2017-04-20 NOTE — PROGRESS NOTES
Obstructive Sleep Apnea- PAP Follow-Up Visit:    Chief Complaint   Patient presents with     Sleep Problem     cary       Benjamín Mendoza comes in today for follow-up of their mild sleep apnea, managed with CPAP.   His last PSG ws in 2009 that showed mild CARY AHI 11.6.  He had septoplasty last year and initially was feeling better so limited his use of CPAP.  Now he has more EDS with [place] of 14.  But snores less. His current compliance is 27% with AHI 1.2 while using CPAP.    [unfilled]      Past medical/surgical history, family history, social history, medications and allergies were reviewed.      Problem List:  Patient Active Problem List    Diagnosis Date Noted     Insomnia, unspecified type 08/10/2016     Priority: Medium     Chronic midline low back pain without sciatica 08/10/2016     Priority: Medium     Patient is followed by ERIBERTO KONG for ongoing prescription of pain medication.  All refills should be approved by this provider, or covering partner.    Medication(s): Oxycodone 5-3 25.   Maximum quantity per month: 90 for 3 months  Clinic visit frequency required: Q 6  months     Controlled substance agreement:  Encounter-Level CSA:     There are no encounter-level csa.      letter updated August 10, 2016   Pain Clinic evaluation in the past: No    DIRE Total Score(s):  No flowsheet data found.    Last [facility] website verification:  done on august 10,2016   [email or web address]       Chronic pain syndrome 08/10/2016     Priority: Medium     Tinnitus of both ears 01/20/2015     Priority: Medium     Advanced directives, counseling/discussion 04/03/2017     Gave pt information on 04/03/17.  Reina Villatoro, [facility]         GERD (gastroesophageal reflux disease) 11/20/2013     CARDIOVASCULAR SCREENING; LDL GOAL LESS THAN 160 10/31/2010     CARY (obstructive sleep apnea) 02/03/2010     PDS 3/2009 AHI 26 Usha 84%        Headache 03/26/2007     Patient is followed by DOV  NIRAV SAINZ for ongoing prescription of narcotic pain medicine.  Med: percocet.   Maximum use per month: 15-30  Expected duration: ongoing  Narcotic agreement on file: YES  Clinic visit recommended: Q 6  Months    Patient is followed by ERIBERTO LARSON for ongoing prescription of narcotic pain medicine.  Med: Percocet.   Maximum use per month:  15-30  Expected duration: Chronic  Narcotic agreement on file: YES  Clinic visit recommended: Q 6  Months  January 31, 2013     Problem list name updated by automated process. Provider to review          There were no vitals taken for this visit.  Nose - straight septum and small turbinates  Oral - Betancourt 3, long palate and uvula and mild macroglossia   Class 1 occlusion      Impression/Plan:  The patient with EDS stop bang 5 and poor CPAP compliance. We need to investigate his current CARY status to make better decisions weather his CARY may be main contributor to his EDS.   Mild Sleep apnea. NOT Tolerating PAP well. Daytime symptoms are worsened..   Will obtain HST.     Benjamín Mendoza will follow up in about 1 month(s).     Fifteen minutes spent with patient, all of which were spent face-to-face counseling, consulting, coordinating plan of care.            CC:  Eriberto Larson Oleg Froymovich, MD

## 2017-04-20 NOTE — PATIENT INSTRUCTIONS

## 2017-04-25 ENCOUNTER — TELEPHONE (OUTPATIENT)
Dept: FAMILY MEDICINE | Facility: OTHER | Age: 57
End: 2017-04-25

## 2017-04-25 DIAGNOSIS — E78.5 HYPERLIPIDEMIA, UNSPECIFIED HYPERLIPIDEMIA TYPE: ICD-10-CM

## 2017-04-25 RX ORDER — ATORVASTATIN CALCIUM 40 MG/1
40 TABLET, FILM COATED ORAL DAILY
Qty: 90 TABLET | Refills: 1 | Status: SHIPPED | OUTPATIENT
Start: 2017-04-25 | End: 2017-05-26

## 2017-04-25 NOTE — TELEPHONE ENCOUNTER
Reason for Call:  Other prescription    Detailed comments: pt states was given prescription of 30 day supply of atorvastatin (LIPITOR) 40 MG tablet in hospital. Pt states wondering if he is supposed to continue medication or if only supposed to be on it for 30 days. Pt states if to continue then pt needs refill and would [facility]. Please advise and contact pt in regards.    Phone Number Patient can be reached at: Home number on file [phone] (home)    Best Time: ANY      Can we leave a detailed message on this number? YES    Call taken on 4/25/2017 at 12:50 PM by Tish Montanez

## 2017-04-25 NOTE — TELEPHONE ENCOUNTER
Patient informed, per patient he would like to also ask if he should keep taking his Carafate. Per patient he has been taking it like asked, 4x per day but still has the pressure feeling in his chest. please advise.  Nan Gaming, CMA

## 2017-04-25 NOTE — TELEPHONE ENCOUNTER
Per - can stop this now. Patient needs to schedule appointment in couple weeks   Alexsandra MONTERO MA

## 2017-04-27 ENCOUNTER — OFFICE VISIT (OUTPATIENT)
Dept: SLEEP MEDICINE | Facility: CLINIC | Age: 57
End: 2017-04-27
Payer: COMMERCIAL

## 2017-04-27 DIAGNOSIS — G47.33 OSA (OBSTRUCTIVE SLEEP APNEA): ICD-10-CM

## 2017-04-27 DIAGNOSIS — G47.19 EXCESSIVE DAYTIME SLEEPINESS: ICD-10-CM

## 2017-04-27 PROCEDURE — G0399 HOME SLEEP TEST/TYPE 3 PORTA: HCPCS | Performed by: OTOLARYNGOLOGY

## 2017-04-27 NOTE — MR AVS SNAPSHOT
"              After Visit Summary   4/27/2017    Benjamín Mendoza    MRN: [id number]           Patient Information     Date Of Birth          1960        Visit Information        Provider Department      4/27/2017 3:00 PM PH BED 3 [facility]        Today's Diagnoses     Excessive daytime sleepiness        CARY (obstructive sleep apnea)           Follow-ups after your visit        Your next 10 appointments already scheduled     May 18, 2017  3:45 PM CDT   Return Sleep Patient with Ken Molina MD   [facility] ([facility])    [address] [place] [address] [phone]              Who to contact     If you have questions or need follow up information about today's clinic visit or your schedule please contact [facility] directly at [phone].  Normal or non-critical lab and imaging results will be communicated to you by MyChart, letter or phone within 4 business days after the clinic has received the results. If you do not hear from us within 7 days, please contact the clinic through MyChart or phone. If you have a critical or abnormal lab result, we will notify you by phone as soon as possible.  Submit refill requests through [employer] or call your pharmacy and they will forward the refill request to us. Please allow 3 business days for your refill to be completed.          Additional Information About Your Visit        [employer] Information     [employer] lets you send messages to your doctor, view your test results, renew your prescriptions, schedule appointments and more. To sign up, go to [email or web address] . Click on \"Log in\" on the left side of the screen, which will take you to the Welcome page. Then click on \"Sign up Now\" on the right side of the page.     You will be asked to enter the access code listed below, as well as some personal information. Please follow the directions to create your username and " password.     Your access code is: [email or web address]  Expires: 2017  7:06 PM     Your access code will  in 90 days. If you need help or a new code, please call your [place] clinic or [phone].        Care EveryWhere ID     This is your Care EveryWhere ID. This could be used by other organizations to access your [place] medical records  [id number]         Blood Pressure from Last 3 Encounters:   17 110/70   17 99/59   17 132/78    Weight from Last 3 Encounters:   17 85.3 kg (188 lb)   17 85.5 kg (188 lb 8 oz)   17 84.2 kg (185 lb 10 oz)              Today, you had the following     No orders found for display       Primary Care Provider Office Phone # Fax #    Aleida Larson PA-C [phone]       [facility] [address] [place] [address]        Thank you!     Thank you for choosing [place] [facility]  for your care. Our goal is always to provide you with excellent care. Hearing back from our patients is one way we can continue to improve our services. Please take a few minutes to complete the written survey that you may receive in the mail after your visit with us. Thank you!             Your Updated Medication List - Protect others around you: Learn how to safely use, store and throw away your medicines at www.disposemymeds.org.          This list is accurate as of: 17 11:59 PM.  Always use your most recent med list.                   Brand Name Dispense Instructions for use    aspirin EC 81 MG EC tablet     60 tablet    Take 1 tablet (81 mg) by mouth daily       atorvastatin 40 MG tablet    LIPITOR    90 tablet    Take 1 tablet (40 mg) by mouth daily       cephALEXin 500 MG capsule    KEFLEX    21 capsule    Take 1 capsule (500 mg) by mouth 4 times daily       klonoPIN 2 MG tablet   Generic drug:  clonazePAM      Take 2 mg by mouth At Bedtime Reported on 2017       metaxalone 800 MG tablet    SKELAXIN      Take 800 mg by mouth every evening       omeprazole 20 MG CR capsule    priLOSEC    180 capsule    Take 2 capsules (40 mg) by mouth daily       ONE-A-DAY MENS PO      Take 1 tablet by mouth every evening       order for DME      1 Device Auto CPAP @@ 5-15 cm with heated humidifier and heated tube via mask of choice per order of Melody Orourke PA-C.       oxyCODONE-acetaminophen 5-325 MG per tablet    PERCOCET    90 tablet    Take 1-2 tablets by mouth every 4 hours as needed for pain (moderate to severe)       sucralfate 1 GM tablet    CARAFATE    120 tablet    Take 1 tablet (1 g) by mouth 4 times daily

## 2017-05-01 NOTE — NURSING NOTE
Patient is completing a home sleep test for concerns primarily related to snoring and suspected CARY.  He was instructed on how to put on the Noxturnal T3 device and associated equipment before going to bed and given the opportunity to practice putting it on before leaving the sleep center.  He was reminded to bring the home sleep test kit back to the center tomorrow for download and reporting.

## 2017-05-02 NOTE — PROGRESS NOTES
"HOME SLEEP STUDY INTERPRETATION    Patient: Benjamín Mendoza  MRN: [id number]  YOB: 1960  Study Date: 2017  Referring Provider: Aleida Larson; PAC  Ordering Provider: Ken Molina MD     Indications for Home Study: Benjamín Mendoza is a 57 year old male with a history of mild CARY in the past with AHI 11.6  AND WORSENING HYPERSOMNIA who presents with symptoms suggestive of  Exacerbation of obstructive sleep apnea.    Estimated body mass index is 26.98 kg/(m^2) as calculated from the following:    Height as of 17: 1.778 m (5' 10\").    Weight as of 17: 85.3 kg (188 lb).  [place] Sleepiness Scale:   STOP-BAN/8    Data: A full night home sleep study was performed recording the standard physiologic parameters including body position, movement, sound, nasal pressure, thermal oral airflow, chest and abdominal movements with respiratory inductance plethysmography, and oxygen saturation by pulse oximetry. Pulse rate was estimated by oximetry recording. This study was considered adequate based on > 4 hours of quality oximetry and respiratory recording. As specified by the AASM Manual for the Scoring of Sleep and Associated events, version 2.3, Rule VIII.D 1B, 4% oxygen desaturation scoring for hypopneas is used as a standard of care on all home sleep apnea testing.    Analysis Time:  440 minutes    Respiration:   Sleep Associated Hypoxemia: sustained hypoxemia was not present. Baseline oxygen saturation was 93.8%.  Time with saturation less than or equal to 88% was 3.1 minutes. The lowest oxygen saturation was 81%.   Snoring: Snoring was present.  Respiratory events: The home study revealed a presence of 43 obstructive apneas and 2 mixed and central apneas. There were 33 hypopneas resulting in a combined apnea/hypopnea index [AHI] of 10.5 events per hour.  AHI was 19.1 per hour supine, 0 per hour prone, 4.1 per hour on left side, and 2.6 per hour on right side.   Pattern: Excluding events " noted above, respiratory rate and pattern was Normal.    Position: Percent of time spent: supine - 45%, prone - 0%, on left - 29%, on right - 26%.    Heart Rate: By pulse oximetry tachycardia was noted.     Assessment:   Mild obstructive sleep apnea.(no significant change from prior study, definitely strong positional component)  Sleep associated hypoxemia was not present.    Recommendations:  Consider oral appliance therapy or positional therapy.  Consider continuation of CPAP and means to improve compliance.  Suggest optimizing sleep hygiene and avoiding sleep deprivation.  Weight management.  Investigate other causes of hypersomnia.    Diagnosis Code(s): Obstructive Sleep Apnea G47.33, hypersomnia G47.10    Ken Molina MD, May 2, 2017   Diplomate, American Board of Otolaryngology, Sleep Medicine

## 2017-05-18 ENCOUNTER — OFFICE VISIT (OUTPATIENT)
Dept: SLEEP MEDICINE | Facility: CLINIC | Age: 57
End: 2017-05-18
Payer: COMMERCIAL

## 2017-05-18 VITALS
HEIGHT: 70 IN | OXYGEN SATURATION: 99 % | HEART RATE: 50 BPM | WEIGHT: 188 LBS | DIASTOLIC BLOOD PRESSURE: 72 MMHG | SYSTOLIC BLOOD PRESSURE: 135 MMHG | BODY MASS INDEX: 26.92 KG/M2

## 2017-05-18 DIAGNOSIS — G47.33 OSA (OBSTRUCTIVE SLEEP APNEA): Primary | ICD-10-CM

## 2017-05-18 PROCEDURE — 99214 OFFICE O/P EST MOD 30 MIN: CPT | Performed by: OTOLARYNGOLOGY

## 2017-05-18 NOTE — NURSING NOTE
"Chief Complaint   Patient presents with     Sleep Problem     go over hst       Initial /72  Pulse 50  Ht 1.778 m (5' 10\")  Wt 85.3 kg (188 lb)  SpO2 99%  BMI 26.98 kg/m2 Estimated body mass index is 26.98 kg/(m^2) as calculated from the following:    Height as of this encounter: 1.778 m (5' 10\").    Weight as of this encounter: 85.3 kg (188 lb).  Medication Reconciliation: complete    "

## 2017-05-18 NOTE — MR AVS SNAPSHOT
After Visit Summary   5/18/2017    Benjamín Mendoza    MRN: [id number]           Patient Information     Date Of Birth          1960        Visit Information        Provider Department      5/18/2017 3:45 PM Ken Molina MD [place] [facility]        Today's Diagnoses     CARY (obstructive sleep apnea)    -  1      Care Instructions      Your BMI is Body mass index is 26.98 kg/(m^2).  Weight management is a personal decision.  If you are interested in exploring weight loss strategies, the following discussion covers the approaches that may be successful. Body mass index (BMI) is one way to tell whether you are at a healthy weight, overweight, or obese. It measures your weight in relation to your height.  A BMI of 18.5 to 24.9 is in the healthy range. A person with a BMI of 25 to 29.9 is considered overweight, and someone with a BMI of 30 or greater is considered obese. More than two-thirds of American adults are considered overweight or obese.  Being overweight or obese increases the risk for further weight gain. Excess weight may lead to heart disease and diabetes.  Creating and following plans for healthy eating and physical activity may help you improve your health.  Weight control is part of healthy lifestyle and includes exercise, emotional health, and healthy eating habits. Careful eating habits lifelong are the mainstay of weight control. Though there are significant health benefits from weight loss, long-term weight loss with diet alone may be very difficult to achieve- studies show long-term success with dietary management in less than 10% of people. Attaining a healthy weight may be especially difficult to achieve in those with severe obesity. In some cases, medications, devices and surgical management might be considered.  What can you do?  If you are overweight or obese and are interested in methods for weight loss, you should discuss this with your provider.     Consider  reducing daily calorie intake by 500 calories.     Keep a food journal.     Avoiding skipping meals, consider cutting portions instead.    Diet combined with exercise helps maintain muscle while optimizing fat loss. Strength training is particularly important for building and maintaining muscle mass. Exercise helps reduce stress, increase energy, and improves fitness. Increasing exercise without diet control, however, may not burn enough calories to loose weight.       Start walking three days a week 10-20 minutes at a time    Work towards walking thirty minutes five days a week     Eventually, increase the speed of your walking for 1-2 minutes at time    In addition, we recommend that you review healthy lifestyles and methods for weight loss available through the National Institutes of Health patient information sites:  http://win.niddk.nih.gov/publications/index.htm    And look into health and wellness programs that may be available through your health insurance provider, employer, local community center, or sincere club.    Weight management plan: Patient was referred to their PCP to discuss a diet and exercise plan.            Follow-ups after your visit        Who to contact     If you have questions or need follow up information about today's clinic visit or your schedule please contact [facility] directly at [phone].  Normal or non-critical lab and imaging results will be communicated to you by MyChart, letter or phone within 4 business days after the clinic has received the results. If you do not hear from us within 7 days, please contact the clinic through [employer] or phone. If you have a critical or abnormal lab result, we will notify you by phone as soon as possible.  Submit refill requests through [employer] or call your pharmacy and they will forward the refill request to us. Please allow 3 business days for your refill to be completed.          Additional Information About Your  "Visit        MyChart Information     [employer] lets you send messages to your doctor, view your test results, renew your prescriptions, schedule appointments and more. To sign up, go to [email or web address] . Click on \"Log in\" on the left side of the screen, which will take you to the Welcome page. Then click on \"Sign up Now\" on the right side of the page.     You will be asked to enter the access code listed below, as well as some personal information. Please follow the directions to create your username and password.     Your access code is: [email or web address]  Expires: 2017  7:06 PM     Your access code will  in 90 days. If you need help or a new code, please call your [facility] clinic or [phone].        Care EveryWhere ID     This is your Care EveryWhere ID. This could be used by other organizations to access your [facility] medical records  [id number]        Your Vitals Were     Pulse Height Pulse Oximetry BMI (Body Mass Index)          50 1.778 m (5' 10\") 99% 26.98 kg/m2         Blood Pressure from Last 3 Encounters:   17 135/72   17 110/70   17 99/59    Weight from Last 3 Encounters:   17 85.3 kg (188 lb)   17 85.3 kg (188 lb)   17 85.5 kg (188 lb 8 oz)              Today, you had the following     No orders found for display       Primary Care Provider Office Phone # Fax #    Aleida Larson PA-C [phone]       [facility] [address] [place] [address]        Thank you!     Thank you for choosing [place] [facility]  for your care. Our goal is always to provide you with excellent care. Hearing back from our patients is one way we can continue to improve our services. Please take a few minutes to complete the written survey that you may receive in the mail after your visit with us. Thank you!             Your Updated Medication List - Protect others around you: Learn how to safely use, store and throw away your " medicines at www.disposemymeds.org.          This list is accurate as of: 5/18/17  6:46 PM.  Always use your most recent med list.                   Brand Name Dispense Instructions for use    aspirin EC 81 MG EC tablet     60 tablet    Take 1 tablet (81 mg) by mouth daily       atorvastatin 40 MG tablet    LIPITOR    90 tablet    Take 1 tablet (40 mg) by mouth daily       cephALEXin 500 MG capsule    KEFLEX    21 capsule    Take 1 capsule (500 mg) by mouth 4 times daily       klonoPIN 2 MG tablet   Generic drug:  clonazePAM      Take 2 mg by mouth At Bedtime Reported on 2/27/2017       metaxalone 800 MG tablet    SKELAXIN     Take 800 mg by mouth every evening       omeprazole 20 MG CR capsule    priLOSEC    180 capsule    Take 2 capsules (40 mg) by mouth daily       ONE-A-DAY MENS PO      Take 1 tablet by mouth every evening       order for DME      1 Device Auto CPAP @@ 5-15 cm with heated humidifier and heated tube via mask of choice per order of Melody Orourke PA-C.       oxyCODONE-acetaminophen 5-325 MG per tablet    PERCOCET    90 tablet    Take 1-2 tablets by mouth every 4 hours as needed for pain (moderate to severe)       sucralfate 1 GM tablet    CARAFATE    120 tablet    Take 1 tablet (1 g) by mouth 4 times daily

## 2017-05-18 NOTE — PATIENT INSTRUCTIONS

## 2017-05-18 NOTE — PROGRESS NOTES
Sleep Study Follow-Up Visit:    Date on this visit: 5/18/2017    Benjamín Mendoza comes in today for follow-up of his home sleep study done on 4/27/17 at the [facility] for unrefreshed sleep and possible sleep apnea.    His prior AHI was 11.6. He is tolerating CPAP but still feels unrefreshed with some EDS. He is a very light sleeper and has back issues that may interfere with his sleep. His current HI is 10.5 without desaturations and is largely positional.  These findings were reviewed with patient.     Past medical/surgical history, family history, social history, medications and allergies were reviewed.      Problem List:  Patient Active Problem List    Diagnosis Date Noted     Insomnia, unspecified type 08/10/2016     Priority: Medium     Chronic midline low back pain without sciatica 08/10/2016     Priority: Medium     Patient is followed by ERIBERTO KONG for ongoing prescription of pain medication.  All refills should be approved by this provider, or covering partner.    Medication(s): Oxycodone 5-3 25.   Maximum quantity per month: 90 for 3 months  Clinic visit frequency required: Q 6  months     Controlled substance agreement:  Encounter-Level CSA:     There are no encounter-level csa.      letter updated August 10, 2016   Pain Clinic evaluation in the past: No    DIRE Total Score(s):  No flowsheet data found.    Last [facility] website verification:  done on august 10,2016   [email or web address]       Chronic pain syndrome 08/10/2016     Priority: Medium     Tinnitus of both ears 01/20/2015     Priority: Medium     Advanced directives, counseling/discussion 04/03/2017     Gave pt information on 04/03/17.  Reina Villatoro, [facility]         GERD (gastroesophageal reflux disease) 11/20/2013     CARDIOVASCULAR SCREENING; LDL GOAL LESS THAN 160 10/31/2010     CARY (obstructive sleep apnea) 02/03/2010     PDS 3/2009 AHI 26 Usha 84%        Headache 03/26/2007      Patient is followed by NIRAV MONAE for ongoing prescription of narcotic pain medicine.  Med: percocet.   Maximum use per month: 15-30  Expected duration: ongoing  Narcotic agreement on file: YES  Clinic visit recommended: Q 6  Months    Patient is followed by ERIBERTO KONG for ongoing prescription of narcotic pain medicine.  Med: Percocet.   Maximum use per month:  15-30  Expected duration: Chronic  Narcotic agreement on file: YES  Clinic visit recommended: Q 6  Months  January 31, 2013     Problem list name updated by automated process. Provider to review          Impression/Plan:    At this point after thorough discussion with the patient we decided to try positional therapy with Zzoma.  He will follow up with me in about 3 month(s).     Twenty-five minutes spent with patient, all of which were spent face-to-face counseling, consulting, coordinating plan of care.      Ken Molina MD      CC: Eriberto Kong

## 2017-05-23 ENCOUNTER — OFFICE VISIT (OUTPATIENT)
Dept: FAMILY MEDICINE | Facility: OTHER | Age: 57
End: 2017-05-23
Payer: COMMERCIAL

## 2017-05-23 VITALS
WEIGHT: 194 LBS | HEART RATE: 58 BPM | HEIGHT: 70 IN | DIASTOLIC BLOOD PRESSURE: 68 MMHG | SYSTOLIC BLOOD PRESSURE: 120 MMHG | TEMPERATURE: 98.2 F | BODY MASS INDEX: 27.77 KG/M2 | OXYGEN SATURATION: 99 % | RESPIRATION RATE: 14 BRPM

## 2017-05-23 DIAGNOSIS — J06.9 VIRAL URI WITH COUGH: ICD-10-CM

## 2017-05-23 DIAGNOSIS — R07.0 THROAT PAIN: Primary | ICD-10-CM

## 2017-05-23 LAB
DEPRECATED S PYO AG THROAT QL EIA: NORMAL
MICRO REPORT STATUS: NORMAL
SPECIMEN SOURCE: NORMAL

## 2017-05-23 PROCEDURE — 87081 CULTURE SCREEN ONLY: CPT | Performed by: PHYSICIAN ASSISTANT

## 2017-05-23 PROCEDURE — 87880 STREP A ASSAY W/OPTIC: CPT | Performed by: PHYSICIAN ASSISTANT

## 2017-05-23 PROCEDURE — 99213 OFFICE O/P EST LOW 20 MIN: CPT | Performed by: PHYSICIAN ASSISTANT

## 2017-05-23 ASSESSMENT — PAIN SCALES - GENERAL: PAINLEVEL: NO PAIN (0)

## 2017-05-23 ASSESSMENT — ANXIETY QUESTIONNAIRES
2. NOT BEING ABLE TO STOP OR CONTROL WORRYING: SEVERAL DAYS
6. BECOMING EASILY ANNOYED OR IRRITABLE: SEVERAL DAYS
GAD7 TOTAL SCORE: 5
5. BEING SO RESTLESS THAT IT IS HARD TO SIT STILL: NOT AT ALL
IF YOU CHECKED OFF ANY PROBLEMS ON THIS QUESTIONNAIRE, HOW DIFFICULT HAVE THESE PROBLEMS MADE IT FOR YOU TO DO YOUR WORK, TAKE CARE OF THINGS AT HOME, OR GET ALONG WITH OTHER PEOPLE: SOMEWHAT DIFFICULT
7. FEELING AFRAID AS IF SOMETHING AWFUL MIGHT HAPPEN: NOT AT ALL
3. WORRYING TOO MUCH ABOUT DIFFERENT THINGS: SEVERAL DAYS
1. FEELING NERVOUS, ANXIOUS, OR ON EDGE: SEVERAL DAYS

## 2017-05-23 ASSESSMENT — PATIENT HEALTH QUESTIONNAIRE - PHQ9: 5. POOR APPETITE OR OVEREATING: SEVERAL DAYS

## 2017-05-23 NOTE — NURSING NOTE
"Chief Complaint   Patient presents with     Cough     Pharyngitis       Initial /68  Pulse 58  Temp 98.2  F (36.8  C) (Temporal)  Resp 14  Ht 5' 10\" (1.778 m)  Wt 194 lb (88 kg)  SpO2 99%  BMI 27.84 kg/m2 Estimated body mass index is 27.84 kg/(m^2) as calculated from the following:    Height as of this encounter: 5' 10\" (1.778 m).    Weight as of this encounter: 194 lb (88 kg).  Medication Reconciliation: complete     Aurelia Bravo MA    "

## 2017-05-23 NOTE — MR AVS SNAPSHOT
"              After Visit Summary   5/23/2017    Benjamín Mendoza    MRN: [id number]           Patient Information     Date Of Birth          1960        Visit Information        Provider Department      5/23/2017 11:15 AM Aleida Larson PA-C [facility]        Today's Diagnoses     Throat pain    -  1       Follow-ups after your visit        Your next 10 appointments already scheduled     Alfonso 15, 2017 12:00 PM CDT   Office Visit with Holger Chappell MD   [facility] ([facility])    [address] [place] [id number] [phone]           Bring a current list of meds and any records pertaining to this visit.  For Physicals, please bring immunization records and any forms needing to be filled out.  Please arrive 10 minutes early to complete paperwork.              Who to contact     If you have questions or need follow up information about today's clinic visit or your schedule please contact [facility] directly at [phone].  Normal or non-critical lab and imaging results will be communicated to you by [employer], letter or phone within 4 business days after the clinic has received the results. If you do not hear from us within 7 days, please contact the clinic through [employer] or phone. If you have a critical or abnormal lab result, we will notify you by phone as soon as possible.  Submit refill requests through [employer] or call your pharmacy and they will forward the refill request to us. Please allow 3 business days for your refill to be completed.          Additional Information About Your Visit        [employer] Information     [employer] lets you send messages to your doctor, view your test results, renew your prescriptions, schedule appointments and more. To sign up, go to [email or web address] . Click on \"Log in\" on the left side of the screen, which will take you to the Welcome page. Then click on \"Sign up Now\" on the right side of the " "page.     You will be asked to enter the access code listed below, as well as some personal information. Please follow the directions to create your username and password.     Your access code is: [email or web address]  Expires: 2017  7:06 PM     Your access code will  in 90 days. If you need help or a new code, please call your [place] clinic or [phone].        Care EveryWhere ID     This is your Care EveryWhere ID. This could be used by other organizations to access your [facility] medical records  [id number]        Your Vitals Were     Pulse Temperature Respirations Height Pulse Oximetry BMI (Body Mass Index)    58 98.2  F (36.8  C) (Temporal) 14 5' 10\" (1.778 m) 99% 27.84 kg/m2       Blood Pressure from Last 3 Encounters:   17 120/68   17 135/72   17 110/70    Weight from Last 3 Encounters:   17 194 lb (88 kg)   17 188 lb (85.3 kg)   17 188 lb (85.3 kg)              We Performed the Following     Beta strep group A culture     Strep, Rapid Screen        Primary Care Provider Office Phone # Fax #    Aleida Larson PA-C [phone]       [facility] [address] [place] [address]        Thank you!     Thank you for choosing [facility]  for your care. Our goal is always to provide you with excellent care. Hearing back from our patients is one way we can continue to improve our services. Please take a few minutes to complete the written survey that you may receive in the mail after your visit with us. Thank you!             Your Updated Medication List - Protect others around you: Learn how to safely use, store and throw away your medicines at www.disposemymeds.org.          This list is accurate as of: 17 12:30 PM.  Always use your most recent med list.                   Brand Name Dispense Instructions for use    aspirin EC 81 MG EC tablet     60 tablet    Take 1 tablet (81 mg) by mouth daily       atorvastatin 40 MG " tablet    LIPITOR    90 tablet    Take 1 tablet (40 mg) by mouth daily       klonoPIN 2 MG tablet   Generic drug:  clonazePAM      Take 2 mg by mouth At Bedtime Reported on 2/27/2017       metaxalone 800 MG tablet    SKELAXIN     Take 800 mg by mouth every evening       omeprazole 20 MG CR capsule    priLOSEC    180 capsule    Take 2 capsules (40 mg) by mouth daily       ONE-A-DAY MENS PO      Take 1 tablet by mouth every evening       order for DME      1 Device Auto CPAP @@ 5-15 cm with heated humidifier and heated tube via mask of choice per order of Melody Orourke PA-C.       oxyCODONE-acetaminophen 5-325 MG per tablet    PERCOCET    90 tablet    Take 1-2 tablets by mouth every 4 hours as needed for pain (moderate to severe)

## 2017-05-23 NOTE — PROGRESS NOTES
"  SUBJECTIVE:                                                    Benjamín Mendoza is a 57 year old male who presents to clinic today for the following health issues:  Patient no longer taking carafate- removed     Acute Illness   Acute illness concerns: cough, sore throat  Onset: 2-3 days ago     Fever: no     Chills/Sweats: YES    Headache (location?): YES- sinus areas    Sinus Pressure:YES- over the weekend     Conjunctivitis:  no    Ear Pain: no    Rhinorrhea: no     Congestion: YES, no PND sinus sinus surgery , has done very well    Sore Throat: YES- woke with horrific pain this morning , able to swallow     Cough: YES-non-productive, feels congested in chest    Wheeze: YES    Decreased Appetite: YES- intermittent     Nausea: no     Vomiting: no     Diarrhea:  no     Dysuria/Freq.: no     Fatigue/Achiness: YES    Sick/Strep Exposure: no      Therapies Tried and outcome: Patient has been resting to help.       Problem list and histories reviewed & adjusted, as indicated.  Additional history: as documented    BP Readings from Last 3 Encounters:   05/23/17 120/68   05/18/17 135/72   04/12/17 110/70    Wt Readings from Last 3 Encounters:   05/23/17 194 lb (88 kg)   05/18/17 188 lb (85.3 kg)   04/20/17 188 lb (85.3 kg)                  Labs reviewed in EPIC    Reviewed and updated as needed this visit by clinical staff       Reviewed and updated as needed this visit by Provider         ROS:  As documented above     OBJECTIVE:                                                    /68  Pulse 58  Temp 98.2  F (36.8  C) (Temporal)  Resp 14  Ht 5' 10\" (1.778 m)  Wt 194 lb (88 kg)  SpO2 99%  BMI 27.84 kg/m2  Body mass index is 27.84 kg/(m^2).  GENERAL: healthy, alert and no distress  EYES: Eyes grossly normal to inspection  HENT: normal cephalic/atraumatic, ear canals and TM's normal, nose and mouth without ulcers or lesions, nasal mucosa edematous , oropharynx clear, oral mucous membranes moist and sinuses: not " tender  NECK: no adenopathy, no asymmetry, masses, or scars and thyroid normal to palpation  RESP: lungs clear to auscultation - no rales, rhonchi or wheezes  CV: regular rate and rhythm, normal S1 S2, no S3 or S4, no murmur, click or rub, no peripheral edema and peripheral pulses strong  MS: no gross musculoskeletal defects noted, no edema    Diagnostic Test Results:  Results for orders placed or performed in visit on 05/23/17 (from the past 24 hour(s))   Strep, Rapid Screen   Result Value Ref Range    Specimen Description Throat     Rapid Strep A Screen       NEGATIVE: No Group A streptococcal antigen detected by immunoassay, await   culture report.      Micro Report Status FINAL 05/23/2017         ASSESSMENT/PLAN:                                                          1. Viral URI with cough  otc meds, like mucinex, fluids and rest, no ABX today, though if sinus symptoms increase will have him call I would rx amox 875 bid for 10 days     2. Throat pain  Await culture   - Strep, Rapid Screen  - Beta strep group A culture        Aleida Larson PA-C  [facility]  Electronically signed by Aleida Larson PA-C

## 2017-05-24 LAB
BACTERIA SPEC CULT: NORMAL
MICRO REPORT STATUS: NORMAL
SPECIMEN SOURCE: NORMAL

## 2017-05-24 ASSESSMENT — PATIENT HEALTH QUESTIONNAIRE - PHQ9: SUM OF ALL RESPONSES TO PHQ QUESTIONS 1-9: 5

## 2017-05-24 ASSESSMENT — ANXIETY QUESTIONNAIRES: GAD7 TOTAL SCORE: 5

## 2017-05-26 DIAGNOSIS — E78.5 HYPERLIPIDEMIA, UNSPECIFIED HYPERLIPIDEMIA TYPE: ICD-10-CM

## 2017-05-26 NOTE — TELEPHONE ENCOUNTER
Atorvastatin     Last Written Prescription Date: 04/25/2017  Last Fill Quantity: 90, # refills: 1  Last Office Visit with G, P or [facility] prescribing provider: 05/23/2017  Next 5 appointments (look out 90 days)     Alfonso 15, 2017 12:00 PM CDT   Office Visit with Holger Chappell MD   [facility] ([facility])    [address] [place] [id number] [phone]                   Lab Results   Component Value Date    CHOL 220 04/04/2017     Lab Results   Component Value Date    HDL 38 04/04/2017     Lab Results   Component Value Date     04/04/2017     Lab Results   Component Value Date    TRIG 127 04/04/2017     Lab Results   Component Value Date    CHOLHDLRATIO 5.9 10/14/2015     Christal Cowan MA 5/26/2017

## 2017-05-30 RX ORDER — ATORVASTATIN CALCIUM 40 MG/1
40 TABLET, FILM COATED ORAL DAILY
Qty: 90 TABLET | Refills: 1 | Status: SHIPPED | OUTPATIENT
Start: 2017-05-30 | End: 2017-11-13

## 2017-06-15 ENCOUNTER — OFFICE VISIT (OUTPATIENT)
Dept: FAMILY MEDICINE | Facility: CLINIC | Age: 57
End: 2017-06-15
Payer: COMMERCIAL

## 2017-06-15 VITALS
SYSTOLIC BLOOD PRESSURE: 106 MMHG | WEIGHT: 187.5 LBS | TEMPERATURE: 98.1 F | HEART RATE: 68 BPM | OXYGEN SATURATION: 98 % | BODY MASS INDEX: 26.9 KG/M2 | RESPIRATION RATE: 16 BRPM | DIASTOLIC BLOOD PRESSURE: 64 MMHG

## 2017-06-15 DIAGNOSIS — Z12.11 SCREEN FOR COLON CANCER: ICD-10-CM

## 2017-06-15 DIAGNOSIS — Z11.59 NEED FOR HEPATITIS C SCREENING TEST: ICD-10-CM

## 2017-06-15 DIAGNOSIS — M54.50 CHRONIC MIDLINE LOW BACK PAIN WITHOUT SCIATICA: ICD-10-CM

## 2017-06-15 DIAGNOSIS — G89.29 CHRONIC MIDLINE LOW BACK PAIN WITHOUT SCIATICA: ICD-10-CM

## 2017-06-15 DIAGNOSIS — R53.83 FATIGUE, UNSPECIFIED TYPE: ICD-10-CM

## 2017-06-15 DIAGNOSIS — G47.33 OSA (OBSTRUCTIVE SLEEP APNEA): Primary | ICD-10-CM

## 2017-06-15 PROCEDURE — 86803 HEPATITIS C AB TEST: CPT | Performed by: FAMILY MEDICINE

## 2017-06-15 PROCEDURE — 99213 OFFICE O/P EST LOW 20 MIN: CPT | Performed by: FAMILY MEDICINE

## 2017-06-15 PROCEDURE — 36415 COLL VENOUS BLD VENIPUNCTURE: CPT | Performed by: FAMILY MEDICINE

## 2017-06-15 RX ORDER — SUCRALFATE 1 G/1
TABLET ORAL
COMMUNITY
Start: 2017-04-12 | End: 2018-05-29

## 2017-06-15 RX ORDER — OXYCODONE AND ACETAMINOPHEN 5; 325 MG/1; MG/1
1-2 TABLET ORAL EVERY 4 HOURS PRN
Qty: 90 TABLET | Refills: 0 | Status: SHIPPED | OUTPATIENT
Start: 2017-06-15 | End: 2018-05-29

## 2017-06-15 ASSESSMENT — PAIN SCALES - GENERAL: PAINLEVEL: MODERATE PAIN (4)

## 2017-06-15 NOTE — PROGRESS NOTES
SUBJECTIVE:  Benjamín Aguilar is here today to discuss some back pain and some sleep apnea.  States he still feels extremely fatigued.  We did some laboratory studies, all of which came back normal.  He was on CPAP but Dr. Molina recently took him off and stated that some of the new studies are saying that it may not be as helpful as we thought.  Benjamín has had return of his snoring, it is affecting his wife's ability to sleep.  He has restless legs which are now returning.  I did tell him that this is out of my area of expertise.  We should probably have him get a second opinion from another Sleep Center.  He was absolutely in agreement with that.  He does need a refill on his narcotics he uses for his midline low back pain and uses a half of a Percocet, maybe 3 times a week.  They help him get sleep at night.  We talked about mood disorder.  We talked about all kinds of things which may be causing his fatigue.  He does not feel that this is the case, wants to pursue the sleep first.      OBJECTIVE:  No exam.      ASSESSMENT:   1.  Obstructive sleep apnea by history.   2.  Fatigue.   3.  History of chronic midline low back pain without sciatica.      PLAN:  The patient also is due for his Hep C screen and his colon screening.  Will get those scheduled for him.  Will get him referred to a sleep center down at [place].  Further workup as per the Sleep Center.  I will inform him of his screening tests that we order for him today.         SYLVIA FERNANDEZ MD             D: 06/15/2017 13:25   T: 06/15/2017 13:44   MT: [id number]      Name:     BENJAMÍN AGUILAR   MRN:      [id number]        Account:      [id number]   :      1960           Visit Date:   06/15/2017      Document: [id number]

## 2017-06-15 NOTE — NURSING NOTE
"Chief Complaint   Patient presents with     Hyperlipidemia     follow up       Initial /64 (BP Location: Left arm, Patient Position: Chair, Cuff Size: Adult Regular)  Pulse 68  Temp 98.1  F (36.7  C) (Tympanic)  Resp 16  Wt 187 lb 8 oz (85 kg)  SpO2 98%  BMI 26.9 kg/m2 Estimated body mass index is 26.9 kg/(m^2) as calculated from the following:    Height as of 5/23/17: 5' 10\" (1.778 m).    Weight as of this encounter: 187 lb 8 oz (85 kg).  Medication Reconciliation: complete   Health Maintenance Due   Topic Date Due     HEPATITIS C SCREENING  03/04/1980     COLON CANCER SCREEN (SYSTEM ASSIGNED)  06/12/2017     Health Maintenance reviewed at today's visit patient asked to schedule/complete:   Colon Cancer:  Patient agrees to schedule   Reina Villatoro, [facility]        "

## 2017-06-15 NOTE — LETTER
[address] [address] [phone]             June 20, 2017    Benjamín SOLOMON Reji  [address] [facility] [address]              Dear Benjamín,    The results of your recent Hepatitis C screening was negative.  Enclosed is a copy of the results.  It was a pleasure to see you at your last appointment.    If you have any questions or concerns, please call myself or my nurse at [phone].      Sincerely,      Holger Chappell MD

## 2017-06-15 NOTE — MR AVS SNAPSHOT
After Visit Summary   6/15/2017    Benjamín Mendoza    MRN: [id number]           Patient Information     Date Of Birth          1960        Visit Information        Provider Department      6/15/2017 12:00 PM Holger Chappell MD [facility]        Today's Diagnoses     CARY (obstructive sleep apnea)    -  1    Screen for colon cancer        Need for hepatitis C screening test        Chronic midline low back pain without sciatica        Fatigue, unspecified type           Follow-ups after your visit        Additional Services     GASTROENTEROLOGY ADULT REF PROCEDURE ONLY       Last Lab Result: Creatinine (mg/dL)       Date                     Value                 04/03/2017               0.86             ----------  Body mass index is 26.9 kg/(m^2).     Needed:  No  Language:  English    Patient will be contacted to schedule procedure.     Please be aware that coverage of these services is subject to the terms and limitations of your health insurance plan.  Call member services at your health plan with any benefit or coverage questions.  Any procedures must be performed at a [facility] facility OR coordinated by your clinic's referral office.    Please bring the following with you to your appointment:    (1) Any X-Rays, CTs or MRIs which have been performed.  Contact the facility where they were done to arrange for  prior to your scheduled appointment.    (2) List of current medications   (3) This referral request   (4) Any documents/labs given to you for this referral            SLEEP EVALUATION & MANAGEMENT REFERRAL - ADULT       Please be aware that coverage of these services is subject to the terms and limitations of your health insurance plan.  Call member services at your health plan with any benefit or coverage questions.      Please bring the following to your appointment:    >>   List of current medications   >>   This referral request   >>   Any documents/labs  "given to you for this referral    [facility] [place] -  [phone]                  Future tests that were ordered for you today     Open Future Orders        Priority Expected Expires Ordered    SLEEP EVALUATION & MANAGEMENT REFERRAL - ADULT Routine  6/15/2018 6/15/2017            Who to contact     If you have questions or need follow up information about today's clinic visit or your schedule please contact [facility] directly at [phone].  Normal or non-critical lab and imaging results will be communicated to you by [employer] [employer], letter or phone within 4 business days after the clinic has received the results. If you do not hear from us within 7 days, please contact the clinic through [employer] or phone. If you have a critical or abnormal lab result, we will notify you by phone as soon as possible.  Submit refill requests through [employer] or call your pharmacy and they will forward the refill request to us. Please allow 3 business days for your refill to be completed.          Additional Information About Your Visit        [employer] Information     [employer] lets you send messages to your doctor, view your test results, renew your prescriptions, schedule appointments and more. To sign up, go to [email or web address] . Click on \"Log in\" on the left side of the screen, which will take you to the Welcome page. Then click on \"Sign up Now\" on the right side of the page.     You will be asked to enter the access code listed below, as well as some personal information. Please follow the directions to create your username and password.     Your access code is: [email or web address]  Expires: 2017  7:06 PM     Your access code will  in 90 days. If you need help or a new code, please call your [facility] or [phone].        Care EveryWhere ID     This is your Care EveryWhere ID. This could be used by other organizations to access your [facility] medical " records  [id number]        Your Vitals Were     Pulse Temperature Respirations Pulse Oximetry BMI (Body Mass Index)       68 98.1  F (36.7  C) (Tympanic) 16 98% 26.9 kg/m2        Blood Pressure from Last 3 Encounters:   06/15/17 106/64   05/23/17 120/68   05/18/17 135/72    Weight from Last 3 Encounters:   06/15/17 187 lb 8 oz (85 kg)   05/23/17 194 lb (88 kg)   05/18/17 188 lb (85.3 kg)              We Performed the Following     GASTROENTEROLOGY ADULT REF PROCEDURE ONLY     Hepatitis C Screen Reflex to HCV RNA Quant and Genotype          Where to get your medicines      Some of these will need a paper prescription and others can be bought over the counter.  Ask your nurse if you have questions.     Bring a paper prescription for each of these medications     oxyCODONE-acetaminophen 5-325 MG per tablet          Primary Care Provider Office Phone # Fax #    Aleida Larson PA-C [phone]       [facility] [address] [place] [address]        Thank you!     Thank you for choosing [facility]  for your care. Our goal is always to provide you with excellent care. Hearing back from our patients is one way we can continue to improve our services. Please take a few minutes to complete the written survey that you may receive in the mail after your visit with us. Thank you!             Your Updated Medication List - Protect others around you: Learn how to safely use, store and throw away your medicines at www.disposemymeds.org.          This list is accurate as of: 6/15/17  1:25 PM.  Always use your most recent med list.                   Brand Name Dispense Instructions for use    aspirin EC 81 MG EC tablet     60 tablet    Take 1 tablet (81 mg) by mouth daily       atorvastatin 40 MG tablet    LIPITOR    90 tablet    Take 1 tablet (40 mg) by mouth daily       klonoPIN 2 MG tablet   Generic drug:  clonazePAM      Take 2 mg by mouth At Bedtime Reported on 2/27/2017        metaxalone 800 MG tablet    SKELAXIN     Take 800 mg by mouth every evening       omeprazole 20 MG CR capsule    priLOSEC    180 capsule    Take 2 capsules (40 mg) by mouth daily       ONE-A-DAY MENS PO      Take 1 tablet by mouth every evening       order for DME      1 Device Auto CPAP @@ 5-15 cm with heated humidifier and heated tube via mask of choice per order of Melody Orourke PA-C.       oxyCODONE-acetaminophen 5-325 MG per tablet    PERCOCET    90 tablet    Take 1-2 tablets by mouth every 4 hours as needed for pain (moderate to severe)       sucralfate 1 GM tablet    CARAFATE

## 2017-06-16 ENCOUNTER — TELEPHONE (OUTPATIENT)
Dept: FAMILY MEDICINE | Facility: CLINIC | Age: 57
End: 2017-06-16

## 2017-06-16 LAB — HCV AB SERPL QL IA: NORMAL

## 2017-06-16 NOTE — TELEPHONE ENCOUNTER
Called to schedule colonoscopy, no voicemail set up yet. Will try again later.    OK to schedule with Aubrey

## 2017-07-12 ENCOUNTER — HOSPITAL ENCOUNTER (OUTPATIENT)
Facility: CLINIC | Age: 57
Discharge: HOME | End: 2017-07-12
Attending: INTERNAL MEDICINE | Admitting: INTERNAL MEDICINE
Payer: COMMERCIAL

## 2017-07-12 ENCOUNTER — SURGERY (OUTPATIENT)
Age: 57
End: 2017-07-12

## 2017-07-12 VITALS
DIASTOLIC BLOOD PRESSURE: 88 MMHG | OXYGEN SATURATION: 100 % | RESPIRATION RATE: 12 BRPM | SYSTOLIC BLOOD PRESSURE: 125 MMHG | TEMPERATURE: 98 F

## 2017-07-12 LAB — COLONOSCOPY: NORMAL

## 2017-07-12 PROCEDURE — G0121 COLON CA SCRN NOT HI RSK IND: HCPCS | Performed by: INTERNAL MEDICINE

## 2017-07-12 PROCEDURE — 25000128 H RX IP 250 OP 636: Performed by: INTERNAL MEDICINE

## 2017-07-12 PROCEDURE — 40000297 ZZH STATISTIC ENDO RECOVERY CLASS 1:2 EACH ADDL HOUR: Performed by: INTERNAL MEDICINE

## 2017-07-12 PROCEDURE — 40000296 ZZH STATISTIC ENDO RECOVERY CLASS 1:2 FIRST HOUR: Performed by: INTERNAL MEDICINE

## 2017-07-12 PROCEDURE — G0500 MOD SEDAT ENDO SERVICE >5YRS: HCPCS

## 2017-07-12 PROCEDURE — 45378 DIAGNOSTIC COLONOSCOPY: CPT | Performed by: INTERNAL MEDICINE

## 2017-07-12 PROCEDURE — 99153 MOD SED SAME PHYS/QHP EA: CPT

## 2017-07-12 PROCEDURE — 25000125 ZZHC RX 250: Performed by: INTERNAL MEDICINE

## 2017-07-12 RX ORDER — LIDOCAINE 40 MG/G
CREAM TOPICAL
Status: DISCONTINUED | OUTPATIENT
Start: 2017-07-12 | End: 2017-07-12 | Stop reason: HOSPADM

## 2017-07-12 RX ORDER — FENTANYL CITRATE 50 UG/ML
INJECTION, SOLUTION INTRAMUSCULAR; INTRAVENOUS PRN
Status: DISCONTINUED | OUTPATIENT
Start: 2017-07-12 | End: 2017-07-12 | Stop reason: HOSPADM

## 2017-07-12 RX ORDER — ONDANSETRON 2 MG/ML
4 INJECTION INTRAMUSCULAR; INTRAVENOUS
Status: DISCONTINUED | OUTPATIENT
Start: 2017-07-12 | End: 2017-07-12 | Stop reason: HOSPADM

## 2017-07-12 RX ADMIN — FENTANYL CITRATE 25 MCG: 50 INJECTION, SOLUTION INTRAMUSCULAR; INTRAVENOUS at 13:26

## 2017-07-12 RX ADMIN — MIDAZOLAM HYDROCHLORIDE 2 MG: 1 INJECTION, SOLUTION INTRAMUSCULAR; INTRAVENOUS at 13:20

## 2017-07-12 RX ADMIN — MIDAZOLAM HYDROCHLORIDE 1 MG: 1 INJECTION, SOLUTION INTRAMUSCULAR; INTRAVENOUS at 13:12

## 2017-07-12 RX ADMIN — MIDAZOLAM HYDROCHLORIDE 1 MG: 1 INJECTION, SOLUTION INTRAMUSCULAR; INTRAVENOUS at 13:13

## 2017-07-12 RX ADMIN — LIDOCAINE HYDROCHLORIDE 1 ML: 10 INJECTION, SOLUTION EPIDURAL; INFILTRATION; INTRACAUDAL; PERINEURAL at 12:17

## 2017-07-12 RX ADMIN — FENTANYL CITRATE 50 MCG: 50 INJECTION, SOLUTION INTRAMUSCULAR; INTRAVENOUS at 13:14

## 2017-07-12 RX ADMIN — FENTANYL CITRATE 50 MCG: 50 INJECTION, SOLUTION INTRAMUSCULAR; INTRAVENOUS at 13:11

## 2017-07-12 RX ADMIN — MIDAZOLAM HYDROCHLORIDE 1 MG: 1 INJECTION, SOLUTION INTRAMUSCULAR; INTRAVENOUS at 13:14

## 2017-07-12 RX ADMIN — MIDAZOLAM HYDROCHLORIDE 2 MG: 1 INJECTION, SOLUTION INTRAMUSCULAR; INTRAVENOUS at 13:11

## 2017-07-12 RX ADMIN — MIDAZOLAM HYDROCHLORIDE 2 MG: 1 INJECTION, SOLUTION INTRAMUSCULAR; INTRAVENOUS at 13:19

## 2017-07-12 RX ADMIN — MIDAZOLAM HYDROCHLORIDE 1 MG: 1 INJECTION, SOLUTION INTRAMUSCULAR; INTRAVENOUS at 13:23

## 2017-07-12 NOTE — CONSULTS
[facility] GI Pre-Procedure Physical Assessment    Benjamín Mendoza MRN# [id number]   Age: 57 year old YOB: 1960      Date of Surgery: 7/12/2017  Location [facility]      Date of Exam 7/12/2017 Facility (Same day)       Home clinic: [facility]  Primary care provider: Aleida Larson         Active problem list:   Patient Active Problem List   Diagnosis     Headache     CARY (obstructive sleep apnea)     CARDIOVASCULAR SCREENING; LDL GOAL LESS THAN 160     GERD (gastroesophageal reflux disease)     Tinnitus of both ears     Insomnia, unspecified type     Chronic midline low back pain without sciatica     Chronic pain syndrome     Advanced directives, counseling/discussion            Medications (include herbals and vitamins):   Any Plavix use in the last 7 days?  No     Current Facility-Administered Medications   Medication     lidocaine 1 % 1 mL     lidocaine (LMX4) kit     sodium chloride (PF) 0.9% PF flush 3 mL     sodium chloride (PF) 0.9% PF flush 3 mL     sodium chloride (PF) 0.9% PF flush 3 mL     ondansetron (ZOFRAN) injection 4 mg             Allergies:      Allergies   Allergen Reactions     Sumatriptan      Migraine medications     Allergy to Latex?  No  Allergy to tape?    No          Social History:     Social History   Substance Use Topics     Smoking status: Never Smoker     Smokeless tobacco: Never Used      Comment: no smokers in household     Alcohol use No            Physical Exam:   All vitals have been reviewed  Blood pressure 121/84, temperature 98  F (36.7  C), temperature source Oral, resp. rate 16, SpO2 97 %.  Airway assessment:   Patient is able to open mouth wide  Patient is able to stick out tongue      Lungs:   No increased work of breathing, good air exchange, clear to auscultation bilaterally, no crackles or wheezing      Cardiovascular:   Normal apical impulse, regular rate and rhythm, normal S1 and S2, no S3 or S4, and no murmur noted            Lab / Radiology Results:   All laboratory data reviewed          Assessment:   Appropriately NPO  Chief complaint or anatomic assessment of involved area: screening         Plan:   Moderate (conscious) sedation     Patient's active problems diagnostically and therapeutically optimized for the planned procedure  Risks, benefits, alternatives to sedation and blood explained and consent obtained  Risks, benefits, alternatives to procedure explained and consent obtained  Orders and progress notes are in the chart  Discharge from Phase 1 and / or Phase 2 recovery when patient meets criteria    I have reviewed the history and physical, lab finding(s), diagnostic data, medicaitons, and the plan for sedation.  I have determined this patient to be an appropriate candidate for the planned sedation / procedure and have reassessed the patient immediately prior to sedation / procedure.    I have personally and medically directed the administration of medications used.    Lake Mueller MD

## 2017-07-26 DIAGNOSIS — G47.9 SLEEP DISORDER: Primary | ICD-10-CM

## 2017-07-26 NOTE — TELEPHONE ENCOUNTER
This was patient reported and placed.    Klonopin      Last Written Prescription Date:  Feb 2017  Last Fill Quantity: unknown,   # refills: unknown  Last Office Visit with [facility] prescribing provider: 06/15/17  Future Office visit:       Routing refill request to provider for review/approval because:  Drug not on the [facility] refill protocol or controlled substance      Patient reported when seeing another provider than another person discontinued from med list.

## 2017-07-26 NOTE — TELEPHONE ENCOUNTER
Reason for Call:  Medication or medication refill:    Do you use a [facility]?  Name of the pharmacy and phone number for the current request:  [facility]    Name of the medication requested: clonazePAM (KLONOPIN) 2 MG tablet    Other request: patient states he has no refills left of above RX. Please advise. Patient knows Dr. Chappell is out    Can we leave a detailed message on this number? YES    Phone number patient can be reached at: Cell number on file:    Telephone Information:   Mobile [phone]       Best Time: anytime    Call taken on 7/26/2017 at 9:59 AM by Mila Schmitz

## 2017-07-27 RX ORDER — CLONAZEPAM 2 MG/1
2 TABLET ORAL AT BEDTIME
Qty: 90 TABLET | Refills: 0 | Status: SHIPPED | OUTPATIENT
Start: 2017-07-27 | End: 2017-11-14

## 2017-08-08 ENCOUNTER — TRANSFERRED RECORDS (OUTPATIENT)
Dept: HEALTH INFORMATION MANAGEMENT | Facility: CLINIC | Age: 57
End: 2017-08-08

## 2017-11-13 DIAGNOSIS — E78.5 HYPERLIPIDEMIA, UNSPECIFIED HYPERLIPIDEMIA TYPE: ICD-10-CM

## 2017-11-14 ENCOUNTER — TRANSFERRED RECORDS (OUTPATIENT)
Dept: HEALTH INFORMATION MANAGEMENT | Facility: CLINIC | Age: 57
End: 2017-11-14

## 2017-11-14 DIAGNOSIS — G47.9 SLEEP DISORDER: ICD-10-CM

## 2017-11-14 NOTE — TELEPHONE ENCOUNTER
Requested Prescriptions   Pending Prescriptions Disp Refills     clonazePAM (KLONOPIN) 2 MG tablet 90 tablet 0     Sig: Take 1 tablet (2 mg) by mouth At Bedtime Reported on 2/27/2017    There is no refill protocol information for this order        Routing refill request to provider for review/approval because:  Drug not on the [facility] refill protocol     Bernadette Beckwith RN

## 2017-11-15 RX ORDER — CLONAZEPAM 2 MG/1
2 TABLET ORAL AT BEDTIME
Qty: 90 TABLET | Refills: 0 | Status: SHIPPED | OUTPATIENT
Start: 2017-11-15 | End: 2018-02-23

## 2017-11-15 RX ORDER — ATORVASTATIN CALCIUM 40 MG/1
TABLET, FILM COATED ORAL
Qty: 90 TABLET | Refills: 1 | Status: SHIPPED | OUTPATIENT
Start: 2017-11-15 | End: 2018-05-31

## 2017-11-15 NOTE — TELEPHONE ENCOUNTER
Requested Prescriptions   Pending Prescriptions Disp Refills     atorvastatin (LIPITOR) 40 MG tablet [Pharmacy Med Name: ATORVASTATIN TABS 40MG] 90 tablet 1     Sig: TAKE 1 TABLET DAILY    Statins Protocol Passed    11/14/2017  9:50 AM       Passed - LDL on file in past 12 months    Recent Labs   Lab Test  04/04/17   0516   LDL  157*            Passed - No abnormal creatine kinase in past 12 months    No lab results found.         Passed - Recent or future visit with authorizing provider    Patient had office visit in the last year or has a visit in the next 30 days with authorizing provider.  See chart review.              Passed - Patient is age 18 or older        Routing refill request to provider for review/approval because:  Labs out of range:  Lipids.     Bernadette Beckwith RN

## 2017-11-23 DIAGNOSIS — G89.29 CHRONIC MIDLINE LOW BACK PAIN WITHOUT SCIATICA: Primary | ICD-10-CM

## 2017-11-23 DIAGNOSIS — M54.50 CHRONIC MIDLINE LOW BACK PAIN WITHOUT SCIATICA: Primary | ICD-10-CM

## 2017-11-24 NOTE — TELEPHONE ENCOUNTER
Requested Prescriptions   Pending Prescriptions Disp Refills     metaxalone (SKELAXIN) 800 MG tablet [Pharmacy Med Name: METAXALONE TABS 800MG] 90 tablet 2     Sig: TAKE 1 TABLET THREE TIMES A DAY    There is no refill protocol information for this order        metaxalone (SKELAXIN) 800 MG tablet  Routing refill request to provider for review/approval because:  Drug not on the [facility] refill protocol   Medication is reported/historical  Cecilia Foss RN, BSN

## 2017-11-29 RX ORDER — METAXALONE 800 MG/1
TABLET ORAL
Qty: 90 TABLET | Refills: 2 | Status: SHIPPED | OUTPATIENT
Start: 2017-11-29 | End: 2018-05-29

## 2017-12-05 ENCOUNTER — OFFICE VISIT (OUTPATIENT)
Dept: FAMILY MEDICINE | Facility: OTHER | Age: 57
End: 2017-12-05
Payer: COMMERCIAL

## 2017-12-05 VITALS
SYSTOLIC BLOOD PRESSURE: 118 MMHG | BODY MASS INDEX: 27.25 KG/M2 | HEART RATE: 60 BPM | TEMPERATURE: 97.9 F | DIASTOLIC BLOOD PRESSURE: 80 MMHG | WEIGHT: 189.9 LBS | RESPIRATION RATE: 18 BRPM

## 2017-12-05 DIAGNOSIS — B02.9 HERPES ZOSTER WITHOUT COMPLICATION: Primary | ICD-10-CM

## 2017-12-05 PROCEDURE — 99213 OFFICE O/P EST LOW 20 MIN: CPT | Performed by: PHYSICIAN ASSISTANT

## 2017-12-05 RX ORDER — GABAPENTIN ENACARBIL 600 MG/1
600 TABLET, EXTENDED RELEASE ORAL AT BEDTIME
COMMUNITY
Start: 2017-11-14

## 2017-12-05 RX ORDER — VALACYCLOVIR HYDROCHLORIDE 1 G/1
1000 TABLET, FILM COATED ORAL 3 TIMES DAILY
Qty: 21 TABLET | Refills: 0 | Status: SHIPPED | OUTPATIENT
Start: 2017-12-05 | End: 2018-05-29

## 2017-12-05 ASSESSMENT — PAIN SCALES - GENERAL: PAINLEVEL: NO PAIN (0)

## 2017-12-05 NOTE — MR AVS SNAPSHOT
"              After Visit Summary   2017    Benjamín Mendoza    MRN: [id number]           Patient Information     Date Of Birth          1960        Visit Information        Provider Department      2017 4:20 PM Evaristo Gambino PA-C [facility]        Today's Diagnoses     Herpes zoster without complication    -  1       Follow-ups after your visit        Who to contact     If you have questions or need follow up information about today's clinic visit or your schedule please contact [facility] directly at [phone].  Normal or non-critical lab and imaging results will be communicated to you by [employer], letter or phone within 4 business days after the clinic has received the results. If you do not hear from us within 7 days, please contact the clinic through [employer] or phone. If you have a critical or abnormal lab result, we will notify you by phone as soon as possible.  Submit refill requests through [employer] or call your pharmacy and they will forward the refill request to us. Please allow 3 business days for your refill to be completed.          Additional Information About Your Visit        MyChart Information     [employer] lets you send messages to your doctor, view your test results, renew your prescriptions, schedule appointments and more. To sign up, go to [email or web address] . Click on \"Log in\" on the left side of the screen, which will take you to the Welcome page. Then click on \"Sign up Now\" on the right side of the page.     You will be asked to enter the access code listed below, as well as some personal information. Please follow the directions to create your username and password.     Your access code is: [email or web address]  Expires: 3/5/2018  4:33 PM     Your access code will  in 90 days. If you need help or a new code, please call your [facility] or [phone].        Care EveryWhere ID     This is your Care EveryWhere ID. This could be used by other " organizations to access your [facility] medical records  [id number]        Your Vitals Were     Pulse Temperature Respirations BMI (Body Mass Index)          60 97.9  F (36.6  C) (Temporal) 18 27.25 kg/m2         Blood Pressure from Last 3 Encounters:   12/05/17 118/80   07/12/17 125/88   06/15/17 106/64    Weight from Last 3 Encounters:   12/05/17 189 lb 14.4 oz (86.1 kg)   06/15/17 187 lb 8 oz (85 kg)   05/23/17 194 lb (88 kg)              Today, you had the following     No orders found for display         Today's Medication Changes          These changes are accurate as of: 12/5/17  4:33 PM.  If you have any questions, ask your nurse or doctor.               Start taking these medicines.        Dose/Directions    valACYclovir 1000 mg tablet   Commonly known as:  VALTREX   Used for:  Herpes zoster without complication   Started by:  Evaristo Gambino PA-C        Dose:  1000 mg   Take 1 tablet (1,000 mg) by mouth 3 times daily   Quantity:  21 tablet   Refills:  0            Where to get your medicines      These medications were sent to [facility] - [address]   [address] [place] [address]     Phone:  [phone]     valACYclovir 1000 mg tablet                Primary Care Provider Office Phone # Fax #    Aleida Larson PA-C [phone] [address] [place] [address]        Equal Access to Services     [facility]: Hadii brandyn learyo [place], waaxda luqadaha, qaybta kaalmada mckenzieyada, waxzarina taylor jensen. So [facility] [phone].    ATENCIÓN: Si habla español, tiene a cortes disposición servicios gratuitos de asistencia lingüística. Llame al [phone].    We comply with applicable federal civil rights laws and [place] laws. We do not discriminate on the basis of race, color, national origin, age, disability, sex, sexual orientation, or gender identity.            Thank you!     Thank you for choosing [facility]  for your care. Our goal is always to provide you with excellent care. Hearing back from our patients is one way we can continue to improve our services. Please take a few minutes to complete the written survey that you may receive in the mail after your visit with us. Thank you!             Your Updated Medication List - Protect others around you: Learn how to safely use, store and throw away your medicines at www.disposemymeds.org.          This list is accurate as of: 12/5/17  4:33 PM.  Always use your most recent med list.                   Brand Name Dispense Instructions for use Diagnosis    aspirin EC 81 MG EC tablet     60 tablet    Take 1 tablet (81 mg) by mouth daily    Hyperlipidemia, unspecified hyperlipidemia type       atorvastatin 40 MG tablet    LIPITOR    90 tablet    TAKE 1 TABLET DAILY    Hyperlipidemia, unspecified hyperlipidemia type       clonazePAM 2 MG tablet    klonoPIN    90 tablet    Take 1 tablet (2 mg) by mouth At Bedtime Reported on 2/27/2017    Sleep disorder       HORIZANT 600 MG tablet   Generic drug:  gabapentin enacarbil      Take 600 mg by mouth At Bedtime        metaxalone 800 MG tablet    SKELAXIN    90 tablet    TAKE 1 TABLET THREE TIMES A DAY    Chronic midline low back pain without sciatica       omeprazole 20 MG CR capsule    priLOSEC    180 capsule    Take 2 capsules (40 mg) by mouth daily    Gastroesophageal reflux disease without esophagitis       ONE-A-DAY MENS PO      Take 1 tablet by mouth every evening        order for DME      1 Device Auto CPAP @@ 5-15 cm with heated humidifier and heated tube via mask of choice per order of Melody Orourke PA-C.        oxyCODONE-acetaminophen 5-325 MG per tablet    PERCOCET    90 tablet    Take 1-2 tablets by mouth every 4 hours as needed for pain (moderate to severe)    Chronic midline low back pain without sciatica       sucralfate 1 GM tablet    CARAFATE          valACYclovir 1000 mg tablet    VALTREX    21  tablet    Take 1 tablet (1,000 mg) by mouth 3 times daily    Herpes zoster without complication

## 2017-12-05 NOTE — NURSING NOTE
"Chief Complaint   Patient presents with     Derm Problem     Left side of face       Initial /80 (Cuff Size: Adult Regular)  Pulse 60  Temp 97.9  F (36.6  C) (Temporal)  Resp 18  Wt 189 lb 14.4 oz (86.1 kg)  BMI 27.25 kg/m2 Estimated body mass index is 27.25 kg/(m^2) as calculated from the following:    Height as of 5/23/17: 5' 10\" (1.778 m).    Weight as of this encounter: 189 lb 14.4 oz (86.1 kg).  Medication Reconciliation: complete   Nunu Bautista CMA (AAMA)    "

## 2017-12-05 NOTE — PROGRESS NOTES
SUBJECTIVE:                                                    Benjamín Mendoza is a 57 year old male who presents to clinic today for the following health issues:    Patient has about 1-1/2 cm spot to the left upper cheek that is quite painful. He describes this as searing burning stabbing pain that seems to be quite deeper than the surface lesion that is seen today states that this started on Sunday. He has had shingles in the past that was related to the neck on the left.    HPI    Concern - Spot on face  Onset: 12/03    Description:   Round area on left cheek    Intensity: moderate    Progression of Symptoms:  worsening    Accompanying Signs & Symptoms:  Burning, itching, stabbing    Previous history of similar problem:   none  Therapies Tried and outcome: none      Problem list and histories reviewed & adjusted, as indicated.  Additional history: as documented    ROS:  Constitutional, HEENT, cardiovascular, pulmonary, gi and gu systems are negative, except as otherwise noted.      OBJECTIVE:   /80 (Cuff Size: Adult Regular)  Pulse 60  Temp 97.9  F (36.6  C) (Temporal)  Resp 18  Wt 189 lb 14.4 oz (86.1 kg)  BMI 27.25 kg/m2  Body mass index is 27.25 kg/(m^2).  GENERAL: healthy, alert and no distress  HENT: ear canals and TM's normal, nose and mouth without ulcers or lesions  NECK: no adenopathy, no asymmetry, masses, or scars and thyroid normal to palpation  MS: no gross musculoskeletal defects noted, no edema  SKIN: A slightly erythematous patch to the left upper cheek is noted. This is a fair amount of induration and is not fluctuant within the cheek itself.  No vesicular type lesion is appreciated at this time. It is quite tender to touch today.  PSYCH: mentation appears normal, affect normal/bright    Diagnostic Test Results:  none     ASSESSMENT/PLAN:     1. Herpes zoster without complication  At this point in time his overall subjective signs and symptoms suggest shingles. The findings that I see  on physical exam are not necessarily supportive. As this may be related to the fifth cranial nerve aggressive treatment is discussed and we make a decision to do so today. Rather than waiting for this to flower into a more pathopneumonic rash.  - valACYclovir (VALTREX) 1000 mg tablet; Take 1 tablet (1,000 mg) by mouth 3 times daily  Dispense: 21 tablet; Refill: 0    Evaristo Katz PA-C  [facility]

## 2018-02-05 ENCOUNTER — OFFICE VISIT (OUTPATIENT)
Dept: FAMILY MEDICINE | Facility: OTHER | Age: 58
End: 2018-02-05
Payer: COMMERCIAL

## 2018-02-05 VITALS
SYSTOLIC BLOOD PRESSURE: 106 MMHG | RESPIRATION RATE: 16 BRPM | HEART RATE: 62 BPM | BODY MASS INDEX: 27.43 KG/M2 | HEIGHT: 70 IN | TEMPERATURE: 97.5 F | DIASTOLIC BLOOD PRESSURE: 60 MMHG | WEIGHT: 191.6 LBS

## 2018-02-05 DIAGNOSIS — G89.29 CHRONIC PAIN OF LEFT KNEE: Primary | ICD-10-CM

## 2018-02-05 DIAGNOSIS — M25.562 CHRONIC PAIN OF LEFT KNEE: Primary | ICD-10-CM

## 2018-02-05 PROCEDURE — 99213 OFFICE O/P EST LOW 20 MIN: CPT | Performed by: NURSE PRACTITIONER

## 2018-02-05 NOTE — PATIENT INSTRUCTIONS
Please continue to RICE (rest, ice, elevation and compression). Use ibuprofen (600 - 800 mg) every 6- 8 hours as needed for inflammation, take with food. May use with tylenol 1000 mg 3 times daily.     Please follow up with orthopedic specialty for further clinical evaluation and treatment plan.     Thank you  Laura Mulligan CNP

## 2018-02-05 NOTE — MR AVS SNAPSHOT
After Visit Summary   2/5/2018    Benjamín Mendoza    MRN: [id number]           Patient Information     Date Of Birth          1960        Visit Information        Provider Department      2/5/2018 4:30 PM Laura Mulligan APRN CNP [facility]        Today's Diagnoses     Chronic pain of left knee    -  1      Care Instructions    Please continue to RICE (rest, ice, elevation and compression). Use ibuprofen (600 - 800 mg) every 6- 8 hours as needed for inflammation, take with food. May use with tylenol 1000 mg 3 times daily.     Please follow up with orthopedic specialty for further clinical evaluation and treatment plan.     Thank you  Laura Mulligan CNP            Follow-ups after your visit        Additional Services     ORTHO  REFERRAL       [facility] is referring you to the Orthopedic  Services at [facility].       The  Representative will assist you in the coordination of your Orthopedic and Musculoskeletal Care as prescribed by your physician.    The  Representative will call you within 1 business day to help schedule your appointment, or you may contact the  Representative at:    All areas ~ [phone]     Type of Referral : Surgical / Specialist       Timeframe requested: 3 - 5 days    Coverage of these services is subject to the terms and limitations of your health insurance plan.  Please call member services at your health plan with any benefit or coverage questions.      If X-rays, CT or MRI's have been performed, please contact the facility where they were done to arrange for , prior to your scheduled appointment.  Please bring this referral request to your appointment and present it to your specialist.                  Your next 10 appointments already scheduled     Feb 06, 2018  5:00 PM CST   (Arrive by 4:45 PM)   MR KNEE LEFT W/O CONTRAST with PHMR1   [facility] ([facility])    [address] [id number] [phone]           Take your medicines as usual, unless your doctor tells you not to. Bring a list of your current medicines to your exam (including vitamins, minerals and over-the-counter drugs). Also bring the results of similar scans you may have had.  Please remove any body piercings and hair extensions before you arrive.  Follow your doctor s orders. If you do not, we may have to postpone your exam.  You will not have contrast for this exam. You do not need to do anything special to prepare.  The MRI machine uses a strong magnet. Please wear clothes without metal (snaps, zippers). A sweatsuit works well, or we may give you a hospital gown.   **IMPORTANT** THE INSTRUCTIONS BELOW ARE ONLY FOR THOSE PATIENTS WHO HAVE BEEN TOLD THEY WILL RECEIVE SEDATION OR GENERAL ANESTHESIA DURING THEIR MRI PROCEDURE:  IF YOU WILL RECEIVE SEDATION (take medicine to help you relax during your exam):   You must get the medicine from your doctor before you arrive. Bring the medicine to the exam. Do not take it at home.   Arrive one hour early. Bring someone who can take you home after the test. Your medicine will make you sleepy. After the exam, you may not drive, take a bus or take a taxi by yourself.   No eating 8 hours before your exam. You may have clear liquids up until 4 hours before your exam. (Clear liquids include water, clear tea, black coffee and fruit juice without pulp.)  IF YOU WILL RECEIVE ANESTHESIA (be asleep for your exam):   Arrive 1 1/2 hours early. Bring someone who can take you home after the test. You may not drive, take a bus or take a taxi by yourself.   No eating 8 hours before your exam. You may have clear liquids up until 4 hours before your exam. (Clear liquids include water, clear tea, black coffee and fruit juice without pulp.)   You will spend four to five hours in the recovery room.  Please call the  "Imaging Department at your exam site with any questions.              Future tests that were ordered for you today     Open Future Orders        Priority Expected Expires Ordered    MR Knee Left w/o Contrast Routine  2019            Who to contact     If you have questions or need follow up information about today's clinic visit or your schedule please contact [facility] directly at [phone].  Normal or non-critical lab and imaging results will be communicated to you by MyChart, letter or phone within 4 business days after the clinic has received the results. If you do not hear from us within 7 days, please contact the clinic through [employer] or phone. If you have a critical or abnormal lab result, we will notify you by phone as soon as possible.  Submit refill requests through [employer] or call your pharmacy and they will forward the refill request to us. Please allow 3 business days for your refill to be completed.          Additional Information About Your Visit        [employer] Information     [facility] [employer] lets you send messages to your doctor, view your test results, renew your prescriptions, schedule appointments and more. To sign up, go to [email or web address] . Click on \"Log in\" on the left side of the screen, which will take you to the Welcome page. Then click on \"Sign up Now\" on the right side of the page.     You will be asked to enter the access code listed below, as well as some personal information. Please follow the directions to create your username and password.     Your access code is: [email or web address]  Expires: 3/5/2018  4:33 PM     Your access code will  in 90 days. If you need help or a new code, please call your [place] clinic or [phone].        Care EveryWhere ID     This is your Care EveryWhere ID. This could be used by other organizations to access your [place] medical records  [id number]        Your Vitals Were     Pulse Temperature Respirations Height " "BMI (Body Mass Index)       62 97.5  F (36.4  C) (Oral) 16 5' 10\" (1.778 m) 27.49 kg/m2        Blood Pressure from Last 3 Encounters:   02/05/18 106/60   12/05/17 118/80   07/12/17 125/88    Weight from Last 3 Encounters:   02/05/18 191 lb 9.6 oz (86.9 kg)   12/05/17 189 lb 14.4 oz (86.1 kg)   06/15/17 187 lb 8 oz (85 kg)              We Performed the Following     ORTHO  REFERRAL        Primary Care Provider Office Phone # Fax #    Aleida Larson PA-C [phone] [address] [place] [address]        Equal Access to Services     GIANLUCA MARION : Cecille deluca [place], waaxda luqadaha, qaybta kaalmada adeegyada, lincoln cordero . So [facility] [phone].    ATENCIÓN: Si habla español, tiene a cortes disposición servicios gratuitos de asistencia lingüística. Ll[facility] [phone].    We comply with applicable federal civil rights laws and [place] laws. We do not discriminate on the basis of race, color, national origin, age, disability, sex, sexual orientation, or gender identity.            Thank you!     Thank you for choosing [facility]  for your care. Our goal is always to provide you with excellent care. Hearing back from our patients is one way we can continue to improve our services. Please take a few minutes to complete the written survey that you may receive in the mail after your visit with us. Thank you!             Your Updated Medication List - Protect others around you: Learn how to safely use, store and throw away your medicines at www.disposemymeds.org.          This list is accurate as of 2/5/18  5:12 PM.  Always use your most recent med list.                   Brand Name Dispense Instructions for use Diagnosis    aspirin EC 81 MG EC tablet     60 tablet    Take 1 tablet (81 mg) by mouth daily    Hyperlipidemia, unspecified hyperlipidemia type       atorvastatin 40 MG tablet    LIPITOR    90 tablet    TAKE 1 TABLET DAILY    " Hyperlipidemia, unspecified hyperlipidemia type       clonazePAM 2 MG tablet    klonoPIN    90 tablet    Take 1 tablet (2 mg) by mouth At Bedtime Reported on 2/27/2017    Sleep disorder       HORIZANT 600 MG tablet   Generic drug:  gabapentin enacarbil      Take 600 mg by mouth At Bedtime        metaxalone 800 MG tablet    SKELAXIN    90 tablet    TAKE 1 TABLET THREE TIMES A DAY    Chronic midline low back pain without sciatica       omeprazole 20 MG CR capsule    priLOSEC    180 capsule    Take 2 capsules (40 mg) by mouth daily    Gastroesophageal reflux disease without esophagitis       ONE-A-DAY MENS PO      Take 1 tablet by mouth every evening        order for DME      1 Device Auto CPAP @@ 5-15 cm with heated humidifier and heated tube via mask of choice per order of Melody Orourke PA-C.        oxyCODONE-acetaminophen 5-325 MG per tablet    PERCOCET    90 tablet    Take 1-2 tablets by mouth every 4 hours as needed for pain (moderate to severe)    Chronic midline low back pain without sciatica       sucralfate 1 GM tablet    CARAFATE          valACYclovir 1000 mg tablet    VALTREX    21 tablet    Take 1 tablet (1,000 mg) by mouth 3 times daily    Herpes zoster without complication

## 2018-02-05 NOTE — PROGRESS NOTES
"  SUBJECTIVE:   Benjamín Mendoza is a 57 year old male who presents to clinic today for the following health issues:      HPI    Joint Pain    Onset: last week Monday     Description:   Location: left knee  Character: Dull ache and Burning    Intensity: moderate    Progression of Symptoms: better    Accompanying Signs & Symptoms:    Other symptoms: swelling but this is getting better with daily ice     History:   Previous similar pain: YES- for a few years off and on has \"tweaked\" knee here and there but this is by far the worst       Precipitating factors:   Trauma or overuse: YES- walking [place] the beach and suddenly had pain     Alleviating factors:  Improved by: ice    Therapies Tried and outcome:Ice, rest, ibuprofen (percoet at night)     States symptoms started about 1- 2 years ago. With symptoms being sporatic and infrequent. Include tenderness by the end of the day and cause a limp. These symptoms would improve after ice and elevation. Symptoms would last about 4 days and then would go away for several months.   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     This episode he is having increased symptoms of pain and swelling.       Problem list and histories reviewed & adjusted, as indicated.  Additional history: as documented    Patient Active Problem List   Diagnosis     Headache     CARY (obstructive sleep apnea)     CARDIOVASCULAR SCREENING; LDL GOAL LESS THAN 160     GERD (gastroesophageal reflux disease)     Tinnitus of both ears     Insomnia, unspecified type     Chronic midline low back pain without sciatica     Chronic pain syndrome     Advanced directives, counseling/discussion     Past Surgical History:   Procedure Laterality Date     COLONOSCOPY  06/12/07     COLONOSCOPY N/A 7/12/2017    Procedure: COLONOSCOPY;  colonoscopy;  Surgeon: Lake Mueller MD;  Location:  GI      TRABECULOPLASTY BY LASER SURGERY  2000    LASIK     SEPTOPLASTY, TURBINOPLASTY, COMBINED N/A 4/26/2016    Procedure: " COMBINED SEPTOPLASTY, TURBINOPLASTY;  Surgeon: Ken Molina MD;  Location: [facility]         Social History   Substance Use Topics     Smoking status: Never Smoker     Smokeless tobacco: Never Used      Comment: no smokers in household     Alcohol use No     Family History   Problem Relation Age of Onset     CEREBROVASCULAR DISEASE Mother 66     aneursym -  at 66     Other - See Comments Sister      Half sister, heart problems unsure what kind     Other - See Comments Brother      Was in a fire     Arthritis Maternal Aunt      Arthritis Maternal Uncle      Muscular Disorder Daughter 18     Small fiber neuropathy     Hypertension No family hx of      DIABETES No family hx of          Current Outpatient Prescriptions   Medication Sig Dispense Refill     HORIZANT 600 MG tablet Take 600 mg by mouth At Bedtime       valACYclovir (VALTREX) 1000 mg tablet Take 1 tablet (1,000 mg) by mouth 3 times daily 21 tablet 0     metaxalone (SKELAXIN) 800 MG tablet TAKE 1 TABLET THREE TIMES A DAY 90 tablet 2     atorvastatin (LIPITOR) 40 MG tablet TAKE 1 TABLET DAILY 90 tablet 1     clonazePAM (KLONOPIN) 2 MG tablet Take 1 tablet (2 mg) by mouth At Bedtime Reported on 2017 90 tablet 0     sucralfate (CARAFATE) 1 GM tablet        oxyCODONE-acetaminophen (PERCOCET) 5-325 MG per tablet Take 1-2 tablets by mouth every 4 hours as needed for pain (moderate to severe) 90 tablet 0     omeprazole (PRILOSEC) 20 MG CR capsule Take 2 capsules (40 mg) by mouth daily (Patient taking differently: Take 40 mg by mouth daily ) 180 capsule 3     aspirin EC 81 MG EC tablet Take 1 tablet (81 mg) by mouth daily 60 tablet 0     ORDER FOR DME 1 Device Auto CPAP @@ 5-15 cm with heated humidifier and heated tube via mask of choice per order of Melody Orourke PA-C.       Multiple Vitamin (ONE-A-DAY MENS PO) Take 1 tablet by mouth every evening        Allergies   Allergen Reactions     Sumatriptan      Migraine medications     BP  "Readings from Last 3 Encounters:   02/05/18 106/60   12/05/17 118/80   07/12/17 125/88    Wt Readings from Last 3 Encounters:   02/05/18 191 lb 9.6 oz (86.9 kg)   12/05/17 189 lb 14.4 oz (86.1 kg)   06/15/17 187 lb 8 oz (85 kg)                  Labs reviewed in EPIC    ROS:  Constitutional, HEENT, cardiovascular, pulmonary, gi and gu systems are negative, except as otherwise noted.    OBJECTIVE:     /60 (BP Location: Right arm, Patient Position: Chair, Cuff Size: Adult Regular)  Pulse 62  Temp 97.5  F (36.4  C) (Oral)  Resp 16  Ht 5' 10\" (1.778 m)  Wt 191 lb 9.6 oz (86.9 kg)  BMI 27.49 kg/m2  Body mass index is 27.49 kg/(m^2).  GENERAL: healthy, alert and no distress  NECK: no adenopathy, no asymmetry, masses, or scars and thyroid normal to palpation  RESP: lungs clear to auscultation - no rales, rhonchi or wheezes  CV: regular rate and rhythm, normal S1 S2, no S3 or S4, no murmur, click or rub, no peripheral edema and peripheral pulses strong  MS: Knee Exam: Inspection: AP/lateral alignment normal, small effusion, No quad atrophy  Tender: medial patellar facet, quadriceps insertion  Active Range of Motion: decreased flexion  60 degrees, pain with flexion, no pain with flexion, full extension  Strength: full  Special tests: negative posterior drawer, positive Jules's    ASSESSMENT/PLAN:     1. Chronic pain of left knee  Patient with chronic left knee pain that is currently worsening. Recommend MRI to rule out meniscal tear. Follow up with orthopedic specialty.   - MR Knee Left w/o Contrast; Future  - ORTHO  REFERRAL  Home care instructions were reviewed with the patient. The risks, benefits and treatment options of prescribed medications or other treatments have been discussed with the patient. The patient verbalized their understanding and should call or follow up if no improvement or if they develop further problems.  Return to clinic prn    Patient Instructions   Please continue to RICE " (rest, ice, elevation and compression). Use ibuprofen (600 - 800 mg) every 6- 8 hours as needed for inflammation, take with food. May use with tylenol 1000 mg 3 times daily.     Please follow up with orthopedic specialty for further clinical evaluation and treatment plan.     Thank you  Laura Mulligan CNP

## 2018-02-05 NOTE — NURSING NOTE
"Chief Complaint   Patient presents with     Knee Pain       Initial /60 (BP Location: Right arm, Patient Position: Chair, Cuff Size: Adult Regular)  Pulse 62  Temp 97.5  F (36.4  C) (Oral)  Resp 16  Ht 5' 10\" (1.778 m)  Wt 191 lb 9.6 oz (86.9 kg)  BMI 27.49 kg/m2 Estimated body mass index is 27.49 kg/(m^2) as calculated from the following:    Height as of this encounter: 5' 10\" (1.778 m).    Weight as of this encounter: 191 lb 9.6 oz (86.9 kg).  Medication Reconciliation: complete    "

## 2018-02-06 ENCOUNTER — HOSPITAL ENCOUNTER (OUTPATIENT)
Dept: MRI IMAGING | Facility: CLINIC | Age: 58
Discharge: HOME | End: 2018-02-06
Attending: NURSE PRACTITIONER | Admitting: NURSE PRACTITIONER
Payer: COMMERCIAL

## 2018-02-06 DIAGNOSIS — G89.29 CHRONIC PAIN OF LEFT KNEE: ICD-10-CM

## 2018-02-06 DIAGNOSIS — M25.562 CHRONIC PAIN OF LEFT KNEE: ICD-10-CM

## 2018-02-06 PROCEDURE — 73721 MRI JNT OF LWR EXTRE W/O DYE: CPT | Mod: LT

## 2018-02-09 ENCOUNTER — TELEPHONE (OUTPATIENT)
Dept: FAMILY MEDICINE | Facility: OTHER | Age: 58
End: 2018-02-09

## 2018-02-09 NOTE — TELEPHONE ENCOUNTER
Reason for Call:  Request for results:    Name of test or procedure: MRI    Date of test of procedure: 2/6/18    Location of the test or procedure: [facility] to leave the result message on voice mail or with a family member? YES    Phone number Patient can be reached at:  Home number on file [phone] (home)    Additional comments: please call with results    Call taken on 2/9/2018 at 12:13 PM by Laurel Hawkins

## 2018-02-09 NOTE — PROGRESS NOTES
Please advise Benjamín Mendoza,  1960, that his MRI results were positive for meniscal tear, chondromalacia (wearing) behind patella, ligaments are ok. Please follow up with orthopedic specialty as discussed.     [phone] (home)     mike Mulligan

## 2018-02-12 ENCOUNTER — RADIANT APPOINTMENT (OUTPATIENT)
Dept: GENERAL RADIOLOGY | Facility: OTHER | Age: 58
End: 2018-02-12
Attending: ORTHOPAEDIC SURGERY
Payer: COMMERCIAL

## 2018-02-12 ENCOUNTER — OFFICE VISIT (OUTPATIENT)
Dept: ORTHOPEDICS | Facility: OTHER | Age: 58
End: 2018-02-12
Payer: COMMERCIAL

## 2018-02-12 VITALS — HEIGHT: 70 IN | WEIGHT: 191 LBS | BODY MASS INDEX: 27.35 KG/M2 | TEMPERATURE: 98.2 F

## 2018-02-12 DIAGNOSIS — S83.242A OTHER TEAR OF MEDIAL MENISCUS OF LEFT KNEE AS CURRENT INJURY, INITIAL ENCOUNTER: ICD-10-CM

## 2018-02-12 DIAGNOSIS — M94.262 CHONDROMALACIA, KNEE, LEFT: Primary | ICD-10-CM

## 2018-02-12 DIAGNOSIS — M25.562 LEFT KNEE PAIN: ICD-10-CM

## 2018-02-12 PROCEDURE — 99243 OFF/OP CNSLTJ NEW/EST LOW 30: CPT | Mod: 25 | Performed by: ORTHOPAEDIC SURGERY

## 2018-02-12 PROCEDURE — 20610 DRAIN/INJ JOINT/BURSA W/O US: CPT | Performed by: ORTHOPAEDIC SURGERY

## 2018-02-12 PROCEDURE — 73564 X-RAY EXAM KNEE 4 OR MORE: CPT | Mod: LT

## 2018-02-12 RX ORDER — TRIAMCINOLONE ACETONIDE 40 MG/ML
40 INJECTION, SUSPENSION INTRA-ARTICULAR; INTRAMUSCULAR ONCE
Qty: 1 ML | Refills: 0
Start: 2018-02-12 | End: 2018-02-12

## 2018-02-12 ASSESSMENT — PAIN SCALES - GENERAL: PAINLEVEL: SEVERE PAIN (6)

## 2018-02-12 NOTE — NURSING NOTE
"Chief Complaint   Patient presents with     Knee Pain     left knee pain     Consult     reF: Laura Isaak NP       Initial Temp 98.2  F (36.8  C) (Temporal)  Ht 1.778 m (5' 10\")  Wt 86.6 kg (191 lb)  BMI 27.41 kg/m2 Estimated body mass index is 27.41 kg/(m^2) as calculated from the following:    Height as of this encounter: 1.778 m (5' 10\").    Weight as of this encounter: 86.6 kg (191 lb).  Medication Reconciliation: quique Kendrick/BRYAN     "

## 2018-02-12 NOTE — PROGRESS NOTES
ORTHOPEDIC CONSULT      Chief Complaint: Benjamín Mendoza is a 57 year old male who is being seen for Chief Complaint   Patient presents with     Knee Pain     left knee pain     Consult     reF: Laura Mulligan NP       History of Present Illness:   Benjamín Mendoza is a 57 year old male who is seen in consultation at the request of Laura Mulligan NP for evaluation of knee pain.  Mechanism of Injury: No trauma or inciting event.  Location: left knee deep  Duration of Pain: On and off for a year but much worse over last couple weeks.  Rating of Pain:  moderate.    Pain Quality: aching  Pain is better with: Rest  Pain is worse with:  weightbearing  Treatment so far consists of: Rest, ibuprofen, ice, activity modification.   Associated Features: Swelling  Prior history of related problems:   No specific injuries.  Pain is limiting work.  Over the last year he will have flares of pain the last couple days and resolves.  Recently the pain is not improved.        Patient's past medical, surgical, social and family histories reviewed.     Past Medical History:   Diagnosis Date     Chronic back pain     percocet, spinal fluid leak dx but never visible     Chronic headaches      Esophageal reflux      GERD (gastroesophageal reflux disease)      Motion sickness      CARY (obstructive sleep apnea) 3/2009    AHI 26 Ndir 84%     RLS (restless legs syndrome)        Past Surgical History:   Procedure Laterality Date     COLONOSCOPY  06/12/07     COLONOSCOPY N/A 7/12/2017    Procedure: COLONOSCOPY;  colonoscopy;  Surgeon: Lake Mueller MD;  Location:  GI     HC TRABECULOPLASTY BY LASER SURGERY  2000    LASIK     SEPTOPLASTY, TURBINOPLASTY, COMBINED N/A 4/26/2016    Procedure: COMBINED SEPTOPLASTY, TURBINOPLASTY;  Surgeon: Ken Molina MD;  Location: [facility]         Medications:    Current Outpatient Prescriptions on File Prior to Visit:  HORIZANT 600 MG tablet Take 600 mg by mouth At Bedtime   valACYclovir  (VALTREX) 1000 mg tablet Take 1 tablet (1,000 mg) by mouth 3 times daily   metaxalone (SKELAXIN) 800 MG tablet TAKE 1 TABLET THREE TIMES A DAY   atorvastatin (LIPITOR) 40 MG tablet TAKE 1 TABLET DAILY   clonazePAM (KLONOPIN) 2 MG tablet Take 1 tablet (2 mg) by mouth At Bedtime Reported on 2017   sucralfate (CARAFATE) 1 GM tablet    omeprazole (PRILOSEC) 20 MG CR capsule Take 2 capsules (40 mg) by mouth daily (Patient taking differently: Take 40 mg by mouth daily )   aspirin EC 81 MG EC tablet Take 1 tablet (81 mg) by mouth daily   ORDER FOR DME 1 Device Auto CPAP @@ 5-15 cm with heated humidifier and heated tube via mask of choice per order of Melody Orourke PA-C.   Multiple Vitamin (ONE-A-DAY MENS PO) Take 1 tablet by mouth every evening    oxyCODONE-acetaminophen (PERCOCET) 5-325 MG per tablet Take 1-2 tablets by mouth every 4 hours as needed for pain (moderate to severe)     No current facility-administered medications on file prior to visit.     Allergies   Allergen Reactions     Sumatriptan      Migraine medications       Social History     Occupational History     Elevator service      [employer]     Social History Main Topics     Smoking status: Never Smoker     Smokeless tobacco: Never Used      Comment: no smokers in household     Alcohol use No     Drug use: No     Sexual activity: Yes     Partners: Female     Birth control/ protection: Surgical, Female Surgical      Comment: Wife had a tubal       Family History   Problem Relation Age of Onset     CEREBROVASCULAR DISEASE Mother 66     aneursym -  at 66     Other - See Comments Sister      Half sister, heart problems unsure what kind     Other - See Comments Brother      Was in a fire     Arthritis Maternal Aunt      Arthritis Maternal Uncle      Muscular Disorder Daughter 18     Small fiber neuropathy     Hypertension No family hx of      DIABETES No family hx of        REVIEW OF SYSTEMS  10 point review systems performed otherwise negative  "as noted as per history of present illness.    Physical Exam:  Vitals: Temp 98.2  F (36.8  C) (Temporal)  Ht 5' 10\" (1.778 m)  Wt 191 lb (86.6 kg)  BMI 27.41 kg/m2  BMI= Body mass index is 27.41 kg/(m^2).  Constitutional: healthy, alert and no acute distress   Psychiatric: mentation appears normal and affect normal/bright  NEURO: no focal deficits  RESP: Normal with easy respirations and no use of accessory muscles to breathe, no audible wheezing or retractions  CV: LLE:  no edema         Regular rate and rhythm by palpation  SKIN: No erythema, rashes, excoriation, or breakdown. No evidence of infection.   JOINT/EXTREMITIES:left: Well-developed musculature.  No focal tenderness including bony or joint line.  0-125  active motion.  Small effusion.  Negative Jules.  Negative Lockman.  Patella tracks midline.  No instability or pain with varus valgus testing.     GAIT: antalgic    Diagnostic Modalities:  left knee X-ray: No fracture, dislocation and or lesion. Normal alignment.  Joint space maintained no significant arthritis. No appreciable soft tissue abnormality  left knee X-ray: No fractures or dislocations.  Lateral patellar facet has some grade I chondromalacia.  Mild subchondral edema along the lateral aspect of the weightbearing surface of the medial femoral condyle with some underlying areas of full-thickness cartilage fissuring.  Subchondral cystic changes lateral aspect of the proximal tibial plateau at the posterior insertion.  Posterior horn medial meniscus tear.  Independent visualization of the images was performed.      Impression: left knee posterior horn medial meniscus tear with underlying chondromalacia medial compartment    Plan:  All of the above pertinent physical exam and imaging modalities findings was reviewed with Benjamín and his wife.                                          CONSERVATIVE CARE:  I recommend conservative care for the patient to include NSAIDs, steroid injections, activity " modifications, rest. Today I provided or dispensed Steroid injection.                                        INJECTION PROCEDURE:  The patient was counseled about an  injection, including discussion of risks (including infection), contents of the injection, rationale for performing the injection, and expected benefits of the injection. The skin was prepped with alcohol and betadine and then utilizing sterile technique an injection of the left knee joint from the anterolateral approach in the seated position was performed. The injection consisted 1ml of Kenalog (40mg per 1 ml) mixed with 3ml of 0.5% Marcaine. The patient tolerated the injection well, and there were no complications. The injection site was covered with a Band-Aid. The injection was performed by Randell Sosa, APRN, CNP, DNP    Options reviewed.  Given the underlying chondromalacia in the medial compartment with a meniscus tear of recommended trying some conservative therapy first.  Recommend injection with rest and anti-inflammatories.  If he continues to have pain in approximately 2 weeks we discussed left knee arthroscopy with arthroscopic partial medial meniscectomy.    Return to clinic 2, week(s), or sooner as needed for changes.  Re-x-ray on return: No    Richard Blanco D.O.

## 2018-02-12 NOTE — LETTER
2/12/2018         RE: Benjamín Mendoza  [address] [facility] [address]        Dear Colleague,    Thank you for referring your patient, Benjamín Mendoza, to the [facility]. Please see a copy of my visit note below.    ORTHOPEDIC CONSULT      Chief Complaint: Benjamín Mendoza is a 57 year old male who is being seen for Chief Complaint   Patient presents with     Knee Pain     left knee pain     Consult     reF: Laura Mulligan NP       History of Present Illness:   Benjamín Mendoza is a 57 year old male who is seen in consultation at the request of Laura Mulligan NP for evaluation of knee pain.  Mechanism of Injury: No trauma or inciting event.  Location: left knee deep  Duration of Pain: On and off for a year but much worse over last couple weeks.  Rating of Pain:  moderate.    Pain Quality: aching  Pain is better with: Rest  Pain is worse with:  weightbearing  Treatment so far consists of: Rest, ibuprofen, ice, activity modification.   Associated Features: Swelling  Prior history of related problems:   No specific injuries.  Pain is limiting work.  Over the last year he will have flares of pain the last couple days and resolves.  Recently the pain is not improved.        Patient's past medical, surgical, social and family histories reviewed.     Past Medical History:   Diagnosis Date     Chronic back pain     percocet, spinal fluid leak dx but never visible     Chronic headaches      Esophageal reflux      GERD (gastroesophageal reflux disease)      Motion sickness      CARY (obstructive sleep apnea) 3/2009    AHI 26 Ndir 84%     RLS (restless legs syndrome)        Past Surgical History:   Procedure Laterality Date     COLONOSCOPY  06/12/07     COLONOSCOPY N/A 7/12/2017    Procedure: COLONOSCOPY;  colonoscopy;  Surgeon: Lake Mueller MD;  Location:  GI     HC TRABECULOPLASTY BY LASER SURGERY  2000    LASIK     SEPTOPLASTY, TURBINOPLASTY, COMBINED N/A 4/26/2016    Procedure: COMBINED SEPTOPLASTY,  TURBINOPLASTY;  Surgeon: Ken Molina MD;  [facility]         Medications:    Current Outpatient Prescriptions on File Prior to Visit:  HORIZANT 600 MG tablet Take 600 mg by mouth At Bedtime   valACYclovir (VALTREX) 1000 mg tablet Take 1 tablet (1,000 mg) by mouth 3 times daily   metaxalone (SKELAXIN) 800 MG tablet TAKE 1 TABLET THREE TIMES A DAY   atorvastatin (LIPITOR) 40 MG tablet TAKE 1 TABLET DAILY   clonazePAM (KLONOPIN) 2 MG tablet Take 1 tablet (2 mg) by mouth At Bedtime Reported on 2017   sucralfate (CARAFATE) 1 GM tablet    omeprazole (PRILOSEC) 20 MG CR capsule Take 2 capsules (40 mg) by mouth daily (Patient taking differently: Take 40 mg by mouth daily )   aspirin EC 81 MG EC tablet Take 1 tablet (81 mg) by mouth daily   ORDER FOR DME 1 Device Auto CPAP @@ 5-15 cm with heated humidifier and heated tube via mask of choice per order of Melody Orourke PA-C.   Multiple Vitamin (ONE-A-DAY MENS PO) Take 1 tablet by mouth every evening    oxyCODONE-acetaminophen (PERCOCET) 5-325 MG per tablet Take 1-2 tablets by mouth every 4 hours as needed for pain (moderate to severe)     No current facility-administered medications on file prior to visit.     Allergies   Allergen Reactions     Sumatriptan      Migraine medications       Social History     Occupational History     Elevator service      [employer]     Social History Main Topics     Smoking status: Never Smoker     Smokeless tobacco: Never Used      Comment: no smokers in household     Alcohol use No     Drug use: No     Sexual activity: Yes     Partners: Female     Birth control/ protection: Surgical, Female Surgical      Comment: Wife had a tubal       Family History   Problem Relation Age of Onset     CEREBROVASCULAR DISEASE Mother 66     aneursym -  at 66     Other - See Comments Sister      Half sister, heart problems unsure what kind     Other - See Comments Brother      Was in a fire     Arthritis  "Maternal Aunt      Arthritis Maternal Uncle      Muscular Disorder Daughter 18     Small fiber neuropathy     Hypertension No family hx of      DIABETES No family hx of        REVIEW OF SYSTEMS  10 point review systems performed otherwise negative as noted as per history of present illness.    Physical Exam:  Vitals: Temp 98.2  F (36.8  C) (Temporal)  Ht 5' 10\" (1.778 m)  Wt 191 lb (86.6 kg)  BMI 27.41 kg/m2  BMI= Body mass index is 27.41 kg/(m^2).  Constitutional: healthy, alert and no acute distress   Psychiatric: mentation appears normal and affect normal/bright  NEURO: no focal deficits  RESP: Normal with easy respirations and no use of accessory muscles to breathe, no audible wheezing or retractions  CV: LLE:  no edema         Regular rate and rhythm by palpation  SKIN: No erythema, rashes, excoriation, or breakdown. No evidence of infection.   JOINT/EXTREMITIES:left: Well-developed musculature.  No focal tenderness including bony or joint line.  0-125  active motion.  Small effusion.  Negative Jules.  Negative Lockman.  Patella tracks midline.  No instability or pain with varus valgus testing.     GAIT: antalgic    Diagnostic Modalities:  left knee X-ray: No fracture, dislocation and or lesion. Normal alignment.  Joint space maintained no significant arthritis. No appreciable soft tissue abnormality  left knee X-ray: No fractures or dislocations.  Lateral patellar facet has some grade I chondromalacia.  Mild subchondral edema along the lateral aspect of the weightbearing surface of the medial femoral condyle with some underlying areas of full-thickness cartilage fissuring.  Subchondral cystic changes lateral aspect of the proximal tibial plateau at the posterior insertion.  Posterior horn medial meniscus tear.  Independent visualization of the images was performed.      Impression: left knee posterior horn medial meniscus tear with underlying chondromalacia medial compartment    Plan:  All of the above " pertinent physical exam and imaging modalities findings was reviewed with Benjamín and his wife.                                          CONSERVATIVE CARE:  I recommend conservative care for the patient to include NSAIDs, steroid injections, activity modifications, rest. Today I provided or dispensed Steroid injection.                                        INJECTION PROCEDURE:  The patient was counseled about an  injection, including discussion of risks (including infection), contents of the injection, rationale for performing the injection, and expected benefits of the injection. The skin was prepped with alcohol and betadine and then utilizing sterile technique an injection of the left knee joint from the anterolateral approach in the seated position was performed. The injection consisted 1ml of Kenalog (40mg per 1 ml) mixed with 3ml of 0.5% Marcaine. The patient tolerated the injection well, and there were no complications. The injection site was covered with a Band-Aid. The injection was performed by Randell Sosa, MYRTLE, CNP, DNP    Options reviewed.  Given the underlying chondromalacia in the medial compartment with a meniscus tear of recommended trying some conservative therapy first.  Recommend injection with rest and anti-inflammatories.  If he continues to have pain in approximately 2 weeks we discussed left knee arthroscopy with arthroscopic partial medial meniscectomy.    Return to clinic 2, week(s), or sooner as needed for changes.  Re-x-ray on return: No    Richard Blanco D.O.    Again, thank you for allowing me to participate in the care of your patient.        Sincerely,        Dane Blanco, DO

## 2018-02-12 NOTE — MR AVS SNAPSHOT
"              After Visit Summary   2018    Benjamín Mendoza    MRN: [id number]           Patient Information     Date Of Birth          1960        Visit Information        Provider Department      2018 8:40 AM Dane Blanco,  [facility]        Today's Diagnoses     Left knee pain    -  1       Follow-ups after your visit        Who to contact     If you have questions or need follow up information about today's clinic visit or your schedule please contact [facility] directly at [phone].  Normal or non-critical lab and imaging results will be communicated to you by [employer], letter or phone within 4 business days after the clinic has received the results. If you do not hear from us within 7 days, please contact the clinic through [employer] or phone. If you have a critical or abnormal lab result, we will notify you by phone as soon as possible.  Submit refill requests through [employer] or call your pharmacy and they will forward the refill request to us. Please allow 3 business days for your refill to be completed.          Additional Information About Your Visit        MyChart Information     [employer] lets you send messages to your doctor, view your test results, renew your prescriptions, schedule appointments and more. To sign up, go to [email or web address] . Click on \"Log in\" on the left side of the screen, which will take you to the Welcome page. Then click on \"Sign up Now\" on the right side of the page.     You will be asked to enter the access code listed below, as well as some personal information. Please follow the directions to create your username and password.     Your access code is: [email or web address]  Expires: 3/5/2018  4:33 PM     Your access code will  in 90 days. If you need help or a new code, please call your [facility] or [phone].        Care EveryWhere ID     This is your Care EveryWhere ID. This could be used by other " "organizations to access your [facility] medical records  [id number]        Your Vitals Were     Temperature Height BMI (Body Mass Index)             98.2  F (36.8  C) (Temporal) 1.778 m (5' 10\") 27.41 kg/m2          Blood Pressure from Last 3 Encounters:   02/05/18 106/60   12/05/17 118/80   07/12/17 125/88    Weight from Last 3 Encounters:   02/12/18 86.6 kg (191 lb)   02/05/18 86.9 kg (191 lb 9.6 oz)   12/05/17 86.1 kg (189 lb 14.4 oz)               Primary Care Provider Office Phone # Fax #    Aleida Larson PA-C [phone] [address] [place] [address]        Equal Access to Services     [facility]: Hadii aad ku hadasho Soomaali, waaxda luqadaha, qaybta kaalmada mckenzieyaruddy, lincoln cordero . So [facility] [phone].    ATENCIÓN: Si habla español, tiene a cortes disposición servicios gratuitos de asistencia lingüística. Le al [phone].    We comply with applicable federal civil rights laws and [place] laws. We do not discriminate on the basis of race, color, national origin, age, disability, sex, sexual orientation, or gender identity.            Thank you!     Thank you for choosing [facility]  for your care. Our goal is always to provide you with excellent care. Hearing back from our patients is one way we can continue to improve our services. Please take a few minutes to complete the written survey that you may receive in the mail after your visit with us. Thank you!             Your Updated Medication List - Protect others around you: Learn how to safely use, store and throw away your medicines at www.disposemymeds.org.          This list is accurate as of 2/12/18  8:48 AM.  Always use your most recent med list.                   Brand Name Dispense Instructions for use Diagnosis    aspirin EC 81 MG EC tablet     60 tablet    Take 1 tablet (81 mg) by mouth daily    Hyperlipidemia, unspecified hyperlipidemia type       atorvastatin 40 MG " tablet    LIPITOR    90 tablet    TAKE 1 TABLET DAILY    Hyperlipidemia, unspecified hyperlipidemia type       clonazePAM 2 MG tablet    klonoPIN    90 tablet    Take 1 tablet (2 mg) by mouth At Bedtime Reported on 2/27/2017    Sleep disorder       HORIZANT 600 MG tablet   Generic drug:  gabapentin enacarbil      Take 600 mg by mouth At Bedtime        metaxalone 800 MG tablet    SKELAXIN    90 tablet    TAKE 1 TABLET THREE TIMES A DAY    Chronic midline low back pain without sciatica       omeprazole 20 MG CR capsule    priLOSEC    180 capsule    Take 2 capsules (40 mg) by mouth daily    Gastroesophageal reflux disease without esophagitis       ONE-A-DAY MENS PO      Take 1 tablet by mouth every evening        order for DME      1 Device Auto CPAP @@ 5-15 cm with heated humidifier and heated tube via mask of choice per order of Melody Orourke PA-C.        oxyCODONE-acetaminophen 5-325 MG per tablet    PERCOCET    90 tablet    Take 1-2 tablets by mouth every 4 hours as needed for pain (moderate to severe)    Chronic midline low back pain without sciatica       sucralfate 1 GM tablet    CARAFATE          valACYclovir 1000 mg tablet    VALTREX    21 tablet    Take 1 tablet (1,000 mg) by mouth 3 times daily    Herpes zoster without complication

## 2018-02-23 DIAGNOSIS — G47.9 SLEEP DISORDER: ICD-10-CM

## 2018-02-23 NOTE — TELEPHONE ENCOUNTER
Clonazepam      Last Written Prescription Date:  11/15/17  Last Fill Quantity: 90,   # refills: 0  Last Office Visit: 2/5/18  Future Office visit:       Routing refill request to provider for review/approval because:  Drug not on the G, P or [facility] refill protocol or controlled substance

## 2018-02-26 RX ORDER — CLONAZEPAM 2 MG/1
2 TABLET ORAL AT BEDTIME
Qty: 90 TABLET | Refills: 0 | Status: SHIPPED | OUTPATIENT
Start: 2018-02-26 | End: 2018-05-14

## 2018-03-19 ENCOUNTER — OFFICE VISIT (OUTPATIENT)
Dept: ORTHOPEDICS | Facility: OTHER | Age: 58
End: 2018-03-19
Payer: COMMERCIAL

## 2018-03-19 VITALS — BODY MASS INDEX: 27.49 KG/M2 | HEIGHT: 70 IN | TEMPERATURE: 98 F | WEIGHT: 192 LBS

## 2018-03-19 DIAGNOSIS — S83.242A OTHER TEAR OF MEDIAL MENISCUS OF LEFT KNEE AS CURRENT INJURY, INITIAL ENCOUNTER: ICD-10-CM

## 2018-03-19 DIAGNOSIS — M94.262 CHONDROMALACIA, KNEE, LEFT: Primary | ICD-10-CM

## 2018-03-19 PROCEDURE — 99213 OFFICE O/P EST LOW 20 MIN: CPT | Performed by: ORTHOPAEDIC SURGERY

## 2018-03-19 ASSESSMENT — PAIN SCALES - GENERAL: PAINLEVEL: NO PAIN (0)

## 2018-03-19 NOTE — PROGRESS NOTES
"Office Visit-Follow up    Chief Complaint: Benjamín Mendoza is a 58 year old male who is being seen for   Chief Complaint   Patient presents with     RECHECK     left knee posterior horn medial meniscus tear with underlying chondromalacia medial compartment       History of Present Illness:   Today's visit  Returns to clinic.  States the injection from previous visit eliminated the knee pain. He does still have stiffness/tightness/swelling of the knee. Worse with kneeling,squating and by the end of the day.  Happy with the fact that the pain was resolved.  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 Does use knee pads.  States too busy to attend physical therapy.    2/12/18 visit:  History of Present Illness:   Benjamín Mendoza is a 57 year old male who is seen in consultation at the request of Laura Mulligan NP for evaluation of knee pain.  Mechanism of Injury: No trauma or inciting event.  Location: left knee deep  Duration of Pain: On and off for a year but much worse over last couple weeks.  Rating of Pain:  moderate.    Pain Quality: aching  Pain is better with: Rest  Pain is worse with:  weightbearing  Treatment so far consists of: Rest, ibuprofen, ice, activity modification.   Associated Features: Swelling  Prior history of related problems:   No specific injuries.  Pain is limiting work.  Over the last year he will have flares of pain the last couple days and resolves.  Recently the pain is not improved.      REVIEW OF SYSTEMS  General: negative for, night sweats, dizziness, fatigue  Resp: No shortness of breath and no cough  CV: negative for chest pain, syncope or near-syncope  GI: negative for nausea, vomiting and diarrhea  : negative for dysuria and hematuria  Musculoskeletal: as above  Neurologic: negative for syncope   Hematologic: negative for bleeding disorder    Physical Exam:  Vitals: Temp 98  F (36.7  C) (Temporal)  Ht 1.778 m (5' 10\")  Wt 87.1 kg (192 lb) "  BMI 27.55 kg/m2  BMI= Body mass index is 27.55 kg/(m^2).  Constitutional: healthy, alert and no acute distress   Psychiatric: mentation appears normal and affect normal/bright  NEURO: no focal deficits  RESP: Normal with easy respirations and no use of accessory muscles to breathe, no audible wheezing or retractions  CV: LLE:  no edema         Regular rate and rhythm by palpation  SKIN: No erythema, rashes, excoriation, or breakdown. No evidence of infection.   JOINT/EXTREMITIES:left: Well-developed musculature. Tender medial joint line, no other areas of focal tenderness.  No effusion.  AROM 0-90, 0-110 PROM, mildly positive Jules to medial joint line.  No instability.   Distal neurovascular grossly intact.     GAIT: antalgic          Diagnostic Modalities:  None today.  Previous imaging reviewed.  Independent visualization of the images was performed.      Impression: left knee posterior horn medial meniscus tear with underlying chondromalacia medial and patellofemoral compartment    Plan:  All of the above pertinent physical exam and imaging modalities findings was reviewed with Benjamín.  Discussed with patient regarding knee arthroscopy and limitations given the underlying chondromalacia, also discussed that with pain relief from the steroid injection there may be limited relief.  Discussed with patient further conservative care vs a knee arthroscopy with medial mensicus debridement.  At this point patient declines physical therapy and would like to give it more time.  Did have surgical discussion with patient.  Patient states will think about it, and call in a couple of weeks if no better or worse to come back into clinic to discuss surgery in more detail.  Also discussed if no better could return in a couple of weeks for repeat injection and physical therapy.  Patient will think about it.     Return to clinic PRN, or sooner as needed for changes.  Re-x-ray on return: No    Scribed by:  MYRTLE Osorio,  CNP, LORETTA  8:28 AM  3/19/2018    I attest I have seen and evaluated the patient.  I agree with above impression and plan.  Richard Blanco D.O.

## 2018-03-19 NOTE — NURSING NOTE
"Chief Complaint   Patient presents with     RECHECK     left knee posterior horn medial meniscus tear with underlying chondromalacia medial compartment       Initial Temp 98  F (36.7  C) (Temporal)  Ht 1.778 m (5' 10\")  Wt 87.1 kg (192 lb)  BMI 27.55 kg/m2 Estimated body mass index is 27.55 kg/(m^2) as calculated from the following:    Height as of this encounter: 1.778 m (5' 10\").    Weight as of this encounter: 87.1 kg (192 lb).  Medication Reconciliation: complete   Mireille/BRYAN     "

## 2018-03-19 NOTE — MR AVS SNAPSHOT
"              After Visit Summary   3/19/2018    Benjamín Mendoza    MRN: [id number]           Patient Information     Date Of Birth          1960        Visit Information        Provider Department      3/19/2018 8:10 AM Dane Blanco DO [facility]         Follow-ups after your visit        Your next 10 appointments already scheduled     Mar 19, 2018  8:10 AM CDT   [facility] Visit with Dane Blanco DO   [facility]    [address] [place] [address] [address]  [facility] [id number] [phone]              Who to contact     If you have questions or need follow up information about today's clinic visit or your schedule please contact [facility] directly at [phone].  Normal or non-critical lab and imaging results will be communicated to you by MyChart, letter or phone within 4 business days after the clinic has received the results. If you do not hear from us within 7 days, please contact the clinic through MyChart or phone. If you have a critical or abnormal lab result, we will notify you by phone as soon as possible.  Submit refill requests through [employer] or call your pharmacy and they will forward the refill request to us. Please allow 3 business days for your refill to be completed.          Additional Information About Your Visit        MyChart Information     [employer] lets you send messages to your doctor, view your test results, renew your prescriptions, schedule appointments and more. To sign up, go to [email or web address] . Click on \"Log in\" on the left side of the screen, which will take you to the Welcome page. Then click on \"Sign up Now\" on the right side of the page.     You will be asked to enter the access code listed below, as well as some personal information. Please follow the directions to create your username and password.     Your access code is: [email or web address]  Expires: 6/17/2018  8:05 AM     Your " "access code will  in 90 days. If you need help or a new code, please call your [facility] clinic or [phone].        Care EveryWhere ID     This is your Care EveryWhere ID. This could be used by other organizations to access your [facility] medical records  [id number]        Your Vitals Were     Temperature Height BMI (Body Mass Index)             98  F (36.7  C) (Temporal) 1.778 m (5' 10\") 27.55 kg/m2          Blood Pressure from Last 3 Encounters:   18 106/60   17 118/80   17 125/88    Weight from Last 3 Encounters:   18 87.1 kg (192 lb)   18 86.6 kg (191 lb)   18 86.9 kg (191 lb 9.6 oz)              Today, you had the following     No orders found for display       Primary Care Provider Office Phone # Fax #    Aleida Larson PA-C [phone] [address] [place] [address]        Equal Access to Services     [facility]: Hadii brandyn bonilla hadasho Soomaali, waaxda luqadaha, qaybta kaalmada adeegyaruddy, lincoln cordero . So [facility] [phone].    ATENCIÓN: Si habla español, tiene a cortes disposición servicios gratuitos de asistencia lingüística. Llame al [phone].    We comply with applicable federal civil rights laws and [place] laws. We do not discriminate on the basis of race, color, national origin, age, disability, sex, sexual orientation, or gender identity.            Thank you!     Thank you for choosing [facility]  for your care. Our goal is always to provide you with excellent care. Hearing back from our patients is one way we can continue to improve our services. Please take a few minutes to complete the written survey that you may receive in the mail after your visit with us. Thank you!             Your Updated Medication List - Protect others around you: Learn how to safely use, store and throw away your medicines at www.disposemymeds.org.          This list is accurate as of 3/19/18  8:05 AM.  " Always use your most recent med list.                   Brand Name Dispense Instructions for use Diagnosis    aspirin EC 81 MG EC tablet     60 tablet    Take 1 tablet (81 mg) by mouth daily    Hyperlipidemia, unspecified hyperlipidemia type       atorvastatin 40 MG tablet    LIPITOR    90 tablet    TAKE 1 TABLET DAILY    Hyperlipidemia, unspecified hyperlipidemia type       clonazePAM 2 MG tablet    klonoPIN    90 tablet    Take 1 tablet (2 mg) by mouth At Bedtime Reported on 2/27/2017    Sleep disorder       HORIZANT 600 MG tablet   Generic drug:  gabapentin enacarbil      Take 600 mg by mouth At Bedtime        metaxalone 800 MG tablet    SKELAXIN    90 tablet    TAKE 1 TABLET THREE TIMES A DAY    Chronic midline low back pain without sciatica       omeprazole 20 MG CR capsule    priLOSEC    180 capsule    Take 2 capsules (40 mg) by mouth daily    Gastroesophageal reflux disease without esophagitis       ONE-A-DAY MENS PO      Take 1 tablet by mouth every evening        order for DME      1 Device Auto CPAP @@ 5-15 cm with heated humidifier and heated tube via mask of choice per order of Melody Orourke PA-C.        oxyCODONE-acetaminophen 5-325 MG per tablet    PERCOCET    90 tablet    Take 1-2 tablets by mouth every 4 hours as needed for pain (moderate to severe)    Chronic midline low back pain without sciatica       sucralfate 1 GM tablet    CARAFATE          valACYclovir 1000 mg tablet    VALTREX    21 tablet    Take 1 tablet (1,000 mg) by mouth 3 times daily    Herpes zoster without complication

## 2018-03-19 NOTE — LETTER
3/19/2018         RE: Benjamín Mendoza  [address] [facility] [address]        Dear Colleague,    Thank you for referring your patient, Benjamín Mendoza, to the [facility] [place]. Please see a copy of my visit note below.    Office Visit-Follow up    Chief Complaint: Benjamín Mendoza is a 58 year old male who is being seen for   Chief Complaint   Patient presents with     RECHECK     left knee posterior horn medial meniscus tear with underlying chondromalacia medial compartment       History of Present Illness:   Today's visit  Returns to clinic.  States the injection from previous visit eliminated the knee pain. He does still have stiffness/tightness/swelling of the knee. Worse with kneeling,squating and by the end of the day.  Happy with the fact that the pain was resolved.  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 Does use knee pads.  States too busy to attend physical therapy.    2/12/18 visit:  History of Present Illness:   Benjamín Mendoza is a 57 year old male who is seen in consultation at the request of Laura Mulligan NP for evaluation of knee pain.  Mechanism of Injury: No trauma or inciting event.  Location: left knee deep  Duration of Pain: On and off for a year but much worse over last couple weeks.  Rating of Pain:  moderate.    Pain Quality: aching  Pain is better with: Rest  Pain is worse with:  weightbearing  Treatment so far consists of: Rest, ibuprofen, ice, activity modification.   Associated Features: Swelling  Prior history of related problems:   No specific injuries.  Pain is limiting work.  Over the last year he will have flares of pain the last couple days and resolves.  Recently the pain is not improved.      REVIEW OF SYSTEMS  General: negative for, night sweats, dizziness, fatigue  Resp: No shortness of breath and no cough  CV: negative for chest pain, syncope or near-syncope  GI: negative for nausea, vomiting and diarrhea  :  "negative for dysuria and hematuria  Musculoskeletal: as above  Neurologic: negative for syncope   Hematologic: negative for bleeding disorder    Physical Exam:  Vitals: Temp 98  F (36.7  C) (Temporal)  Ht 1.778 m (5' 10\")  Wt 87.1 kg (192 lb)  BMI 27.55 kg/m2  BMI= Body mass index is 27.55 kg/(m^2).  Constitutional: healthy, alert and no acute distress   Psychiatric: mentation appears normal and affect normal/bright  NEURO: no focal deficits  RESP: Normal with easy respirations and no use of accessory muscles to breathe, no audible wheezing or retractions  CV: LLE:  no edema         Regular rate and rhythm by palpation  SKIN: No erythema, rashes, excoriation, or breakdown. No evidence of infection.   JOINT/EXTREMITIES:left: Well-developed musculature. Tender medial joint line, no other areas of focal tenderness.  No effusion.  AROM 0-90, 0-110 PROM, mildly positive Jules to medial joint line.  No instability.   Distal neurovascular grossly intact.     GAIT: antalgic          Diagnostic Modalities:  None today.  Previous imaging reviewed.  Independent visualization of the images was performed.      Impression: left knee posterior horn medial meniscus tear with underlying chondromalacia medial and patellofemoral compartment    Plan:  All of the above pertinent physical exam and imaging modalities findings was reviewed with Benjamín.  Discussed with patient regarding knee arthroscopy and limitations given the underlying chondromalacia, also discussed that with pain relief from the steroid injection there may be limited relief.  Discussed with patient further conservative care vs a knee arthroscopy with medial mensicus debridement.  At this point patient declines physical therapy and would like to give it more time.  Did have surgical discussion with patient.  Patient states will think about it, and call in a couple of weeks if no better or worse to come back into clinic to discuss surgery in more detail.  Also " discussed if no better could return in a couple of weeks for repeat injection and physical therapy.  Patient will think about it.     Return to clinic PRN, or sooner as needed for changes.  Re-x-ray on return: No    Scribed by:  Randell Sosa, APRN, CNP, DNP  8:28 AM  3/19/2018    I attest I have seen and evaluated the patient.  I agree with above impression and plan.  Richard Blanco D.O.          Again, thank you for allowing me to participate in the care of your patient.        Sincerely,        Dane Blanco, DO

## 2018-05-14 DIAGNOSIS — G47.9 SLEEP DISORDER: ICD-10-CM

## 2018-05-14 RX ORDER — CLONAZEPAM 2 MG/1
2 TABLET ORAL AT BEDTIME
Qty: 90 TABLET | Refills: 0 | Status: SHIPPED | OUTPATIENT
Start: 2018-05-14 | End: 2019-04-17

## 2018-05-14 NOTE — TELEPHONE ENCOUNTER
Klonopin      Last Written Prescription Date:  2/26/2018  Last Fill Quantity: 90,   # refills: 0  Last Office Visit: 2/05/2018  Future Office visit:    Next 5 appointments (look out 90 days)     May 29, 2018  2:10 PM CDT   Office Visit with Holger Chappell MD   [facility] ([facility]    [address] [place] [id number]   [phone]                   Routing refill request to provider for review/approval because:  Drug not on the [facility], [facility] or  Health refill protocol or controlled substance

## 2018-05-14 NOTE — TELEPHONE ENCOUNTER
Reason for Call:  Other prescription    Detailed comments: patient requested refill of Klonopin with [facility] over a week ago.  I informed him that we had not received this.  He will call in but states he will run out prior to his appointment later this week.  Please fill.    Phone Number Patient can be reached at: Home number on file [phone] (home)    Best Time: any    Can we leave a detailed message on this number? YES    Call taken on 5/14/2018 at 7:19 AM by Nico Arriaga

## 2018-05-14 NOTE — TELEPHONE ENCOUNTER
Per RF- ok to refill this.     Called Benjamín to make sure he still wants it to go to [facility]. He does want it to go there. He states he has about 10 days left of pills.  Will route to Misti to sign rx.  Reina Villatoro, [employer]

## 2018-05-14 NOTE — TELEPHONE ENCOUNTER
Patient is calling again because he just spoke with [facility] and they are refusing to fax us a refill request.  Their excuse now if that we do not have secure fax lines, but they do and that we must fax them the refills.  They provided him with their fax number, which is [phone] option 3.  He is sorry about this mess.    He called in his prescription to them a week ago and will be out later this week, please call him when it has been called in so he knows it has been taken care of.  Thank you,  Natalie BATISTA

## 2018-05-29 ENCOUNTER — OFFICE VISIT (OUTPATIENT)
Dept: FAMILY MEDICINE | Facility: CLINIC | Age: 58
End: 2018-05-29
Payer: COMMERCIAL

## 2018-05-29 ENCOUNTER — HOSPITAL ENCOUNTER (OUTPATIENT)
Dept: GENERAL RADIOLOGY | Facility: CLINIC | Age: 58
Discharge: HOME | End: 2018-05-29
Attending: FAMILY MEDICINE | Admitting: FAMILY MEDICINE
Payer: COMMERCIAL

## 2018-05-29 VITALS
BODY MASS INDEX: 26.76 KG/M2 | TEMPERATURE: 98.1 F | RESPIRATION RATE: 12 BRPM | HEART RATE: 69 BPM | DIASTOLIC BLOOD PRESSURE: 56 MMHG | SYSTOLIC BLOOD PRESSURE: 130 MMHG | WEIGHT: 186.5 LBS | OXYGEN SATURATION: 99 %

## 2018-05-29 DIAGNOSIS — G89.29 CHRONIC MIDLINE LOW BACK PAIN WITHOUT SCIATICA: ICD-10-CM

## 2018-05-29 DIAGNOSIS — M54.50 CHRONIC MIDLINE LOW BACK PAIN WITHOUT SCIATICA: ICD-10-CM

## 2018-05-29 DIAGNOSIS — K21.9 GASTROESOPHAGEAL REFLUX DISEASE WITHOUT ESOPHAGITIS: ICD-10-CM

## 2018-05-29 DIAGNOSIS — E78.5 HYPERLIPIDEMIA LDL GOAL <130: Primary | ICD-10-CM

## 2018-05-29 LAB
ALT SERPL W P-5'-P-CCNC: 33 U/L (ref 0–70)
CHOLEST SERPL-MCNC: 134 MG/DL
HDLC SERPL-MCNC: 36 MG/DL
LDLC SERPL CALC-MCNC: 59 MG/DL
NONHDLC SERPL-MCNC: 98 MG/DL
TRIGL SERPL-MCNC: 194 MG/DL

## 2018-05-29 PROCEDURE — 72100 X-RAY EXAM L-S SPINE 2/3 VWS: CPT | Mod: TC

## 2018-05-29 PROCEDURE — 84460 ALANINE AMINO (ALT) (SGPT): CPT | Performed by: FAMILY MEDICINE

## 2018-05-29 PROCEDURE — 99213 OFFICE O/P EST LOW 20 MIN: CPT | Performed by: FAMILY MEDICINE

## 2018-05-29 PROCEDURE — 80061 LIPID PANEL: CPT | Performed by: FAMILY MEDICINE

## 2018-05-29 PROCEDURE — 36415 COLL VENOUS BLD VENIPUNCTURE: CPT | Performed by: FAMILY MEDICINE

## 2018-05-29 RX ORDER — OXYCODONE AND ACETAMINOPHEN 5; 325 MG/1; MG/1
1-2 TABLET ORAL EVERY 4 HOURS PRN
Qty: 90 TABLET | Refills: 0 | Status: ON HOLD | OUTPATIENT
Start: 2018-05-29 | End: 2018-06-08

## 2018-05-29 RX ORDER — ATORVASTATIN CALCIUM 40 MG/1
40 TABLET, FILM COATED ORAL DAILY
Qty: 90 TABLET | Refills: 1 | Status: CANCELLED | OUTPATIENT
Start: 2018-05-29

## 2018-05-29 RX ORDER — METAXALONE 800 MG/1
TABLET ORAL
Qty: 90 TABLET | Refills: 2 | Status: SHIPPED | OUTPATIENT
Start: 2018-05-29 | End: 2019-04-17

## 2018-05-29 ASSESSMENT — ANXIETY QUESTIONNAIRES
2. NOT BEING ABLE TO STOP OR CONTROL WORRYING: NOT AT ALL
IF YOU CHECKED OFF ANY PROBLEMS ON THIS QUESTIONNAIRE, HOW DIFFICULT HAVE THESE PROBLEMS MADE IT FOR YOU TO DO YOUR WORK, TAKE CARE OF THINGS AT HOME, OR GET ALONG WITH OTHER PEOPLE: NOT DIFFICULT AT ALL
GAD7 TOTAL SCORE: 7
3. WORRYING TOO MUCH ABOUT DIFFERENT THINGS: SEVERAL DAYS
5. BEING SO RESTLESS THAT IT IS HARD TO SIT STILL: MORE THAN HALF THE DAYS
7. FEELING AFRAID AS IF SOMETHING AWFUL MIGHT HAPPEN: NOT AT ALL
6. BECOMING EASILY ANNOYED OR IRRITABLE: SEVERAL DAYS
1. FEELING NERVOUS, ANXIOUS, OR ON EDGE: SEVERAL DAYS

## 2018-05-29 ASSESSMENT — PAIN SCALES - GENERAL: PAINLEVEL: MILD PAIN (2)

## 2018-05-29 ASSESSMENT — PATIENT HEALTH QUESTIONNAIRE - PHQ9: 5. POOR APPETITE OR OVEREATING: MORE THAN HALF THE DAYS

## 2018-05-29 NOTE — MR AVS SNAPSHOT
After Visit Summary   5/29/2018    Benjamín Mendoza    MRN: [id number]           Patient Information     Date Of Birth          1960        Visit Information        Provider Department      5/29/2018 2:10 PM Holger Chappell MD [facility]        Today's Diagnoses     Hyperlipidemia LDL goal <130    -  1    Chronic midline low back pain without sciatica        Gastroesophageal reflux disease without esophagitis           Follow-ups after your visit        Future tests that were ordered for you today     Open Future Orders        Priority Expected Expires Ordered    XR Lumbar Spine 2/3 Views Routine 5/29/2018 5/29/2019 5/29/2018            Who to contact     If you have questions or need follow up information about today's clinic visit or your schedule please contact [facility] directly at [phone].  Normal or non-critical lab and imaging results will be communicated to you by MyChart, letter or phone within 4 business days after the clinic has received the results. If you do not hear from us within 7 days, please contact the clinic through MyChart or phone. If you have a critical or abnormal lab result, we will notify you by phone as soon as possible.  Submit refill requests through [employer] or call your pharmacy and they will forward the refill request to us. Please allow 3 business days for your refill to be completed.          Additional Information About Your Visit        Care EveryWhere ID     This is your Care EveryWhere ID. This could be used by other organizations to access your [place] medical records  [id number]        Your Vitals Were     Pulse Temperature Respirations Pulse Oximetry BMI (Body Mass Index)       69 98.1  F (36.7  C) (Tympanic) 12 99% 26.76 kg/m2        Blood Pressure from Last 3 Encounters:   05/29/18 130/56   02/05/18 106/60   12/05/17 118/80    Weight from Last 3 Encounters:   05/29/18 186 lb 8 oz (84.6 kg)   03/19/18 192 lb (87.1 kg)    02/12/18 191 lb (86.6 kg)              We Performed the Following     ALT     Lipid panel reflex to direct LDL Fasting          Today's Medication Changes          These changes are accurate as of 5/29/18  3:56 PM.  If you have any questions, ask your nurse or doctor.               These medicines have changed or have updated prescriptions.        Dose/Directions    * omeprazole 20 MG CR capsule   Commonly known as:  priLOSEC   This may have changed:  Another medication with the same name was added. Make sure you understand how and when to take each.   Used for:  Gastroesophageal reflux disease without esophagitis        Dose:  40 mg   Take 2 capsules (40 mg) by mouth daily   Quantity:  180 capsule   Refills:  3       * omeprazole 20 MG CR capsule   Commonly known as:  priLOSEC   This may have changed:  You were already taking a medication with the same name, and this prescription was added. Make sure you understand how and when to take each.   Used for:  Gastroesophageal reflux disease without esophagitis   Changed by:  Holger Chappell MD        Dose:  20 mg   Take 1 capsule (20 mg) by mouth daily   Quantity:  90 capsule   Refills:  3       * Notice:  This list has 2 medication(s) that are the same as other medications prescribed for you. Read the directions carefully, and ask your doctor or other care provider to review them with you.         Where to get your medicines      These medications were sent to [facility] - [address]  [address] [address]     Phone:  [phone]     metaxalone 800 MG tablet    omeprazole 20 MG CR capsule         Some of these will need a paper prescription and others can be bought over the counter.  Ask your nurse if you have questions.     Bring a paper prescription for each of these medications     oxyCODONE-acetaminophen 5-325 MG per tablet               Information about OPIOIDS     PRESCRIPTION OPIOIDS: WHAT YOU NEED  TO KNOW   You have a prescription for an opioid (narcotic) pain medicine. Opioids can cause addiction. If you have a history of chemical dependency of any type, you are at a higher risk of becoming addicted to opioids. Only take this medicine after all other options have been tried. Take it for as short a time and as few doses as possible.     Do not:    Drive. If you drive while taking these medicines, you could be arrested for driving under the influence (DUI).    Operate heavy machinery    Do any other dangerous activities while taking these medicines.     Drink any alcohol while taking these medicines.      Take with any other medicines that contain acetaminophen. Read all labels carefully. Look for the word  acetaminophen  or  Tylenol.  Ask your pharmacist if you have questions or are unsure.    Store your pills in a secure place, locked if possible. We will not replace any lost or stolen medicine. If you don t finish your medicine, please throw away (dispose) as directed by your pharmacist. The [place] Pollution Control Agency has more information about safe disposal: https://www.pca.[facility].[place].us/living-green/managing-unwanted-medications    All opioids tend to cause constipation. Drink plenty of water and eat foods that have a lot of fiber, such as fruits, vegetables, prune juice, apple juice and high-fiber cereal. Take a laxative (Miralax, milk of magnesia, Colace, Senna) if you don t move your bowels at least every other day.          Primary Care Provider Office Phone # Fax #    Aleida Larson PA-C [phone] [address] [place] [address]        Equal Access to Services     [facility]: Hadii brandyn ku hadasho Soomaali, waaxda luqadaha, qaybta kaalmada adeegyada, lincoln cordero . So [facility] [phone].    ATENCIÓN: Si habla español, tiene a cortes disposición servicios gratuitos de asistencia lingüística. Llame al [phone].    We comply with applicable  federal civil rights laws and [place] laws. We do not discriminate on the basis of race, color, national origin, age, disability, sex, sexual orientation, or gender identity.            Thank you!     Thank you for choosing [facility]  for your care. Our goal is always to provide you with excellent care. Hearing back from our patients is one way we can continue to improve our services. Please take a few minutes to complete the written survey that you may receive in the mail after your visit with us. Thank you!             Your Updated Medication List - Protect others around you: Learn how to safely use, store and throw away your medicines at www.disposemymeds.org.          This list is accurate as of 5/29/18  3:56 PM.  Always use your most recent med list.                   Brand Name Dispense Instructions for use Diagnosis    aspirin 81 MG EC tablet     60 tablet    Take 1 tablet (81 mg) by mouth daily    Hyperlipidemia, unspecified hyperlipidemia type       atorvastatin 40 MG tablet    LIPITOR    90 tablet    TAKE 1 TABLET DAILY    Hyperlipidemia, unspecified hyperlipidemia type       clonazePAM 2 MG tablet    klonoPIN    90 tablet    Take 1 tablet (2 mg) by mouth At Bedtime Reported on 2/27/2017    Sleep disorder       HORIZANT 600 MG tablet   Generic drug:  gabapentin enacarbil      Take 600 mg by mouth At Bedtime        metaxalone 800 MG tablet    SKELAXIN    90 tablet    TAKE 1 TABLET THREE TIMES A DAY    Chronic midline low back pain without sciatica       * omeprazole 20 MG CR capsule    priLOSEC    180 capsule    Take 2 capsules (40 mg) by mouth daily    Gastroesophageal reflux disease without esophagitis       * omeprazole 20 MG CR capsule    priLOSEC    90 capsule    Take 1 capsule (20 mg) by mouth daily    Gastroesophageal reflux disease without esophagitis       ONE-A-DAY MENS PO      Take 1 tablet by mouth every evening        oxyCODONE-acetaminophen 5-325 MG per tablet    PERCOCET     90 tablet    Take 1-2 tablets by mouth every 4 hours as needed for pain (moderate to severe)    Chronic midline low back pain without sciatica       * Notice:  This list has 2 medication(s) that are the same as other medications prescribed for you. Read the directions carefully, and ask your doctor or other care provider to review them with you.

## 2018-05-29 NOTE — PROGRESS NOTES
SUBJECTIVE:   Benjamín Mendoza is a 58 year old male who presents to clinic today for the following health issues:      Hyperlipidemia Follow-Up      Rate your low fat/cholesterol diet?: fair    Taking statin?  Yes, he is unsure if his pain is coming form statin    Other lipid medications/supplements?:  none      Amount of exercise or physical activity: 6-7 days/week for an average of greater than 60 minutes    Problems taking medications regularly: No    Medication side effects: none    Diet: carbohydrate counting      Chief Complaint   Patient presents with     Musculoskeletal Problem     both knees. Had injection in left knee in Feb. It helped a little bit. It is swollen some days and it causes him to limp.      Pain     knees- takes percocet      Hyperlipidemia     follow up             Problem list and histories reviewed & adjusted, as indicated.  Additional history: as documented    Patient Active Problem List   Diagnosis     Headache     CARY (obstructive sleep apnea)     Tinnitus of both ears     Insomnia, unspecified type     Chronic midline low back pain without sciatica     Chronic pain syndrome     Advanced directives, counseling/discussion     Chondromalacia, knee, left     Other tear of medial meniscus of left knee as current injury, initial encounter     Gastroesophageal reflux disease without esophagitis     Past Surgical History:   Procedure Laterality Date     COLONOSCOPY  06/12/07     COLONOSCOPY N/A 7/12/2017    Procedure: COLONOSCOPY;  colonoscopy;  Surgeon: Lake Mueller MD;  Location:  GI      TRABECULOPLASTY BY LASER SURGERY  2000    LASIK     SEPTOPLASTY, TURBINOPLASTY, COMBINED N/A 4/26/2016    Procedure: COMBINED SEPTOPLASTY, TURBINOPLASTY;  Surgeon: Ken Molina MD;  Location: [facility]         Social History   Substance Use Topics     Smoking status: Never Smoker     Smokeless tobacco: Never Used      Comment: no smokers in household     Alcohol use No      Family History   Problem Relation Age of Onset     CEREBROVASCULAR DISEASE Mother 66     aneursym -  at 66     Other - See Comments Sister      Half sister, heart problems unsure what kind     Other - See Comments Brother      Was in a fire     Arthritis Maternal Aunt      Arthritis Maternal Uncle      Muscular Disorder Daughter 18     Small fiber neuropathy     Hypertension No family hx of      DIABETES No family hx of          Current Outpatient Prescriptions   Medication Sig Dispense Refill     aspirin EC 81 MG EC tablet Take 1 tablet (81 mg) by mouth daily 60 tablet 0     atorvastatin (LIPITOR) 40 MG tablet TAKE 1 TABLET DAILY 90 tablet 1     clonazePAM (KLONOPIN) 2 MG tablet Take 1 tablet (2 mg) by mouth At Bedtime Reported on 2017 90 tablet 0     HORIZANT 600 MG tablet Take 600 mg by mouth At Bedtime       metaxalone (SKELAXIN) 800 MG tablet TAKE 1 TABLET THREE TIMES A DAY 90 tablet 2     Multiple Vitamin (ONE-A-DAY MENS PO) Take 1 tablet by mouth every evening        omeprazole (PRILOSEC) 20 MG CR capsule Take 1 capsule (20 mg) by mouth daily 90 capsule 3     omeprazole (PRILOSEC) 20 MG CR capsule Take 2 capsules (40 mg) by mouth daily (Patient taking differently: Take 40 mg by mouth daily ) 180 capsule 3     oxyCODONE-acetaminophen (PERCOCET) 5-325 MG per tablet Take 1-2 tablets by mouth every 4 hours as needed for pain (moderate to severe) 90 tablet 0       Reviewed and updated as needed this visit by clinical staff  Allergies  Meds       Reviewed and updated as needed this visit by Provider            ROS:  Constitutional, HEENT, cardiovascular, pulmonary, gi and gu systems are negative, except as otherwise noted. Chronic back pain stable     OBJECTIVE:     /56  Pulse 69  Temp 98.1  F (36.7  C) (Tympanic)  Resp 12  Wt 186 lb 8 oz (84.6 kg)  SpO2 99%  BMI 26.76 kg/m2  Body mass index is 26.76 kg/(m^2).   GENERAL: healthy, alert and no distress  RESP: lungs clear to auscultation - no  rales, rhonchi or wheezes  CV: regular rate and rhythm, normal S1 S2, no S3 or S4, no murmur, click or rub, no peripheral edema and peripheral pulses strong  MS: no gross musculoskeletal defects noted, no edema    Results for orders placed or performed in visit on 05/29/18 (from the past 24 hour(s))   Lipid panel reflex to direct LDL Fasting   Result Value Ref Range    Cholesterol 134 <200 mg/dL    Triglycerides 194 (H) <150 mg/dL    HDL Cholesterol 36 (L) >39 mg/dL    LDL Cholesterol Calculated 59 <100 mg/dL    Non HDL Cholesterol 98 <130 mg/dL   ALT   Result Value Ref Range    ALT 33 0 - 70 U/L         ASSESSMENT:       PLAN:   1. Chronic midline low back pain without sciatica  stable   - oxyCODONE-acetaminophen (PERCOCET) 5-325 MG per tablet; Take 1-2 tablets by mouth every 4 hours as needed for pain (moderate to severe)  Dispense: 90 tablet; Refill: 0  - metaxalone (SKELAXIN) 800 MG tablet; TAKE 1 TABLET THREE TIMES A DAY  Dispense: 90 tablet; Refill: 2  - XR Lumbar Spine 2/3 Views; mild DJD     2. Gastroesophageal reflux disease without esophagitis  Stable will decrease to 20 mg a day   - omeprazole (PRILOSEC) 20 MG CR capsule; Take 1 capsule (20 mg) by mouth daily  Dispense: 90 capsule; Refill: 3    3. Hyperlipidemia LDL goal <130  Excellent control   - Lipid panel reflex to direct LDL Fasting  - ALT            Holger Chappell MD  [facility]

## 2018-05-30 ENCOUNTER — TELEPHONE (OUTPATIENT)
Dept: ORTHOPEDICS | Facility: OTHER | Age: 58
End: 2018-05-30

## 2018-05-30 ENCOUNTER — TELEPHONE (OUTPATIENT)
Dept: FAMILY MEDICINE | Facility: CLINIC | Age: 58
End: 2018-05-30

## 2018-05-30 DIAGNOSIS — K21.9 GASTROESOPHAGEAL REFLUX DISEASE WITHOUT ESOPHAGITIS: ICD-10-CM

## 2018-05-30 ASSESSMENT — ANXIETY QUESTIONNAIRES: GAD7 TOTAL SCORE: 7

## 2018-05-30 ASSESSMENT — PATIENT HEALTH QUESTIONNAIRE - PHQ9: SUM OF ALL RESPONSES TO PHQ QUESTIONS 1-9: 11

## 2018-05-30 NOTE — LETTER
May 30, 2018      Benjamín Mendoza  [id number] [facility] [address]        Dear ,      Enclosed is a copy of your most recent lab results.         If you have any questions or concerns, please call the clinic at the number listed above.       Sincerely,        Holger Chappell MD

## 2018-05-30 NOTE — TELEPHONE ENCOUNTER
Reason for Call:  Medication or medication refill:    Do you use a [facility]?  Name of the pharmacy and phone number for the current request:  [facility]     Name of the medication requested: Omeprazole and Metaxalone     Other request: These were called in to the wrong pharmacy yesterday- needs 90 day supply for mail order- cannot be transferred     Can we leave a detailed message on this number? YES    Phone number patient can be reached at: Home number on file [phone] (home)    Best Time: any     Call taken on 5/30/2018 at 11:27 AM by Beatriec Sahni

## 2018-05-30 NOTE — TELEPHONE ENCOUNTER
FYI was sent to [facility] pharmacy new pharmacy is listed.  Tracy Moran MA

## 2018-05-30 NOTE — TELEPHONE ENCOUNTER
Pt wants to go ahead with scheduling surgery, please call. He has seen you for consult already, he spoke to his primary Dr. Chapplel and he said he agreed that he should go ahead with surgery.

## 2018-05-30 NOTE — TELEPHONE ENCOUNTER
Letter sent to patient     Alida Arguelles MA    ----- Message from Holger Chappell MD sent at 5/30/2018  2:13 PM CDT -----  Please send this pt. a letter with result values for their home medical file.

## 2018-05-31 DIAGNOSIS — M54.50 CHRONIC MIDLINE LOW BACK PAIN WITHOUT SCIATICA: ICD-10-CM

## 2018-05-31 DIAGNOSIS — G89.29 CHRONIC MIDLINE LOW BACK PAIN WITHOUT SCIATICA: ICD-10-CM

## 2018-05-31 DIAGNOSIS — E78.5 HYPERLIPIDEMIA, UNSPECIFIED HYPERLIPIDEMIA TYPE: ICD-10-CM

## 2018-06-01 ENCOUNTER — TELEPHONE (OUTPATIENT)
Dept: ORTHOPEDICS | Facility: CLINIC | Age: 58
End: 2018-06-01

## 2018-06-01 ENCOUNTER — OFFICE VISIT (OUTPATIENT)
Dept: ORTHOPEDICS | Facility: CLINIC | Age: 58
End: 2018-06-01
Payer: COMMERCIAL

## 2018-06-01 VITALS
SYSTOLIC BLOOD PRESSURE: 130 MMHG | WEIGHT: 186.5 LBS | HEIGHT: 70 IN | DIASTOLIC BLOOD PRESSURE: 56 MMHG | BODY MASS INDEX: 26.7 KG/M2

## 2018-06-01 DIAGNOSIS — S83.242A OTHER TEAR OF MEDIAL MENISCUS OF LEFT KNEE AS CURRENT INJURY, INITIAL ENCOUNTER: Primary | ICD-10-CM

## 2018-06-01 PROCEDURE — 99214 OFFICE O/P EST MOD 30 MIN: CPT | Performed by: ORTHOPAEDIC SURGERY

## 2018-06-01 RX ORDER — METAXALONE 800 MG/1
TABLET ORAL
Qty: 90 TABLET | Refills: 2 | OUTPATIENT
Start: 2018-06-01

## 2018-06-01 RX ORDER — ATORVASTATIN CALCIUM 40 MG/1
TABLET, FILM COATED ORAL
Qty: 90 TABLET | Refills: 3 | Status: SHIPPED | OUTPATIENT
Start: 2018-06-01 | End: 2019-05-01

## 2018-06-01 ASSESSMENT — PAIN SCALES - GENERAL: PAINLEVEL: MODERATE PAIN (4)

## 2018-06-01 NOTE — TELEPHONE ENCOUNTER
"Skelaxin refilled on 05/29/2018.    Lipitor Prescription approved per [facility] Refill Protocol.    Bernadette Beckwith RN     Requested Prescriptions   Pending Prescriptions Disp Refills     metaxalone (SKELAXIN) 800 MG tablet [Pharmacy Med Name: METAXALONE TABS 800MG] 90 tablet 2     Sig: TAKE 1 TABLET THREE TIMES A DAY    There is no refill protocol information for this order        atorvastatin (LIPITOR) 40 MG tablet [Pharmacy Med Name: ATORVASTATIN TABS 40MG] 90 tablet 1     Sig: TAKE 1 TABLET DAILY    Statins Protocol Passed    6/1/2018  9:10 AM       Passed - LDL on file in past 12 months    Recent Labs   Lab Test  05/29/18   1501   LDL  59            Passed - No abnormal creatine kinase in past 12 months    No lab results found.            Passed - Recent (12 mo) or future (30 days) visit within the authorizing provider's specialty    Patient had office visit in the last 12 months or has a visit in the next 30 days with authorizing provider or within the authorizing provider's specialty.  See \"Patient Info\" tab in inbasket, or \"Choose Columns\" in Meds & Orders section of the refill encounter.           Passed - Patient is age 18 or older          "

## 2018-06-01 NOTE — TELEPHONE ENCOUNTER
"Patient states that he was just in to see Dr. Chappell and that he \"thinks he will\" clear him for surgery. If not, can you please work patient in ?   "

## 2018-06-01 NOTE — TELEPHONE ENCOUNTER
Type of surgery: Left knee scope  Location of surgery: [facility]   Date of surgery: 6/8/18  Surgeon: Dr. Blanco  Pre-Op Appt Date: message sent to PCP  Post-Op Appt Date: 6/20/18   Packet sent out: Surgery packet was given to patient in clinic.   Pre-cert/Authorization completed: NA  Date: 6/1/2018    Sanam Todd  Surgery Scheduler

## 2018-06-01 NOTE — PROGRESS NOTES
Office Visit-Follow up    Chief Complaint: Benjamín Mendoza is a 58 year old male who is being seen for   Chief Complaint   Patient presents with     RECHECK     left knee posterior horn medial meniscus tear with underlying chondromalacia medial compartment       History of Present Illness:   6/1/18 visit:  Returns to clinic.  States did get about 1-2 months of pain relief from injection.  However pain has come back and noticing more of a limp. Describes twisting as the most painful activity.  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  Notes good days and bad days. Returns to discuss knee arthroscopy.    3/19/18 visit  Returns to clinic.  States the injection from previous visit eliminated the knee pain. He does still have stiffness/tightness/swelling of the knee. Worse with kneeling,squating and by the end of the day.  Happy with the fact that the pain was resolved.  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 Does use knee pads.  States too busy to attend physical therapy.     2/12/18 visit:  History of Present Illness:   Benjamín Mendoza is a 57 year old male who is seen in consultation at the request of Laura Mulligan NP for evaluation of knee pain.  Mechanism of Injury: No trauma or inciting event.  Location: left knee deep  Duration of Pain: On and off for a year but much worse over last couple weeks.  Rating of Pain:  moderate.    Pain Quality: aching  Pain is better with: Rest  Pain is worse with:  weightbearing  Treatment so far consists of: Rest, ibuprofen, ice, activity modification.   Associated Features: Swelling  Prior history of related problems:   No specific injuries.  Pain is limiting work.  Over the last year he will have flares of pain the last couple days and resolves.  Recently the pain is not improved.  REVIEW OF SYSTEMS  General: negative for, night sweats, dizziness, fatigue  Resp: No  "shortness of breath and no cough  CV: negative for chest pain, syncope or near-syncope  GI: negative for nausea, vomiting and diarrhea  : negative for dysuria and hematuria  Musculoskeletal: as above  Neurologic: negative for syncope   Hematologic: negative for bleeding disorder    Physical Exam:  Vitals: /56  Ht 1.778 m (5' 10\")  Wt 84.6 kg (186 lb 8 oz)  BMI 26.76 kg/m2  BMI= Body mass index is 26.76 kg/(m^2).  Constitutional: healthy, alert and no acute distress   Psychiatric: mentation appears normal and affect normal/bright  NEURO: no focal deficits  RESP: Normal with easy respirations and no use of accessory muscles to breathe, no audible wheezing or retractions  CV: LLE:  no edema         Regular rate and rhythm by palpation  SKIN: No erythema, rashes, excoriation, or breakdown. No evidence of infection.   JOINT/EXTREMITIES:left knee: no effusion, swelling, bruising. Non-tender to joint line.  AROM 0-110, PROM 0-120.  Jules's recreates pain.  No instability.   Distal neurovascular grossly intact.    Diagnostic Modalities:  None today.  Previous imaging reviewed.  Independent visualization of the images was performed.      Impression: left knee posterior horn medial meniscus tear with underlying chondromalacia medial and patellofemoral compartment    Plan:  All of the above pertinent physical exam and imaging modalities findings was reviewed with Benjamín.  Discussed with patient and he would like to attempt knee arthroscopy to address the medial mensicus tear.  Discussed the underlying chondromalacia and limitations of knee arthroscopy with chondromalacia. Patient understands may not get relief of the symptoms with a knee scope especially in the pain is arising more from the chondromalacia vs mensicus tear.  Patient would like to proceed with surgery vs repeat injection and PT.     The patient has attempted conservative treatments that include NSAIDs, ice steroid injections, activity modifications, " time, rest yet still continues to have significant issues. These issues include progressive pain with activity, increasing difficulty at work due to pain. Secondary to these reasons I have offered surgery in the form of LEFT  partial medial menisectomy.    Risks, benefits, complications, limitations, and anticipated postoperative course were discussed. Risks including infections (requiring possible repeat surgery and antibiotics), bleeding, blood clots, cartilage degeneration (arthritis), scar, scar tenderness, stiffness, skin problems and failure to relieve all symptoms were reviewed. I also reviewed the risks of heart attack, stroke, death. Anticipated anesthesia is local with IV sedation.  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 All question were answered, surgery is planned in the near future.     Return to clinic 10 days post-operatively. , or sooner as needed for changes.  Re-x-ray on return: No    Scribed by:  Randell Sosa, APRN, CNP, DNP  3:14 PM  6/1/2018    I attest I have seen and evaluated the patient.  I agree with above impression and plan.  Richard Blanco D.O.

## 2018-06-01 NOTE — LETTER
6/1/2018         RE: Benjamín Mendoza  [address] [facility] [address]        Dear Colleague,    Thank you for referring your patient, Benjamín Mendoza, to the [facility]. Please see a copy of my visit note below.    Office Visit-Follow up    Chief Complaint: Benjamín Mendoza is a 58 year old male who is being seen for   Chief Complaint   Patient presents with     RECHECK     left knee posterior horn medial meniscus tear with underlying chondromalacia medial compartment       History of Present Illness:   6/1/18 visit:  Returns to clinic.  States did get about 1-2 months of pain relief from injection.  However pain has come back and noticing more of a limp. Describes twisting as the most painful activity.  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  Notes good days and bad days. Returns to discuss knee arthroscopy.    3/19/18 visit  Returns to clinic.  States the injection from previous visit eliminated the knee pain. He does still have stiffness/tightness/swelling of the knee. Worse with kneeling,squating and by the end of the day.  Happy with the fact that the pain was resolved.  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 Does use knee pads.  States too busy to attend physical therapy.     2/12/18 visit:  History of Present Illness:   Benjamín Mendoza is a 57 year old male who is seen in consultation at the request of Laura Mulligan NP for evaluation of knee pain.  Mechanism of Injury: No trauma or inciting event.  Location: left knee deep  Duration of Pain: On and off for a year but much worse over last couple weeks.  Rating of Pain:  moderate.    Pain Quality: aching  Pain is better with: Rest  Pain is worse with:  weightbearing  Treatment so far consists of: Rest, ibuprofen, ice, activity modification.   Associated Features: Swelling  Prior history of related problems:   No specific  "injuries.  Pain is limiting work.  Over the last year he will have flares of pain the last couple days and resolves.  Recently the pain is not improved.  REVIEW OF SYSTEMS  General: negative for, night sweats, dizziness, fatigue  Resp: No shortness of breath and no cough  CV: negative for chest pain, syncope or near-syncope  GI: negative for nausea, vomiting and diarrhea  : negative for dysuria and hematuria  Musculoskeletal: as above  Neurologic: negative for syncope   Hematologic: negative for bleeding disorder    Physical Exam:  Vitals: /56  Ht 1.778 m (5' 10\")  Wt 84.6 kg (186 lb 8 oz)  BMI 26.76 kg/m2  BMI= Body mass index is 26.76 kg/(m^2).  Constitutional: healthy, alert and no acute distress   Psychiatric: mentation appears normal and affect normal/bright  NEURO: no focal deficits  RESP: Normal with easy respirations and no use of accessory muscles to breathe, no audible wheezing or retractions  CV: LLE:  no edema         Regular rate and rhythm by palpation  SKIN: No erythema, rashes, excoriation, or breakdown. No evidence of infection.   JOINT/EXTREMITIES:left knee: no effusion, swelling, bruising. Non-tender to joint line.  AROM 0-110, PROM 0-120.  Jules's recreates pain.  No instability.   Distal neurovascular grossly intact.    Diagnostic Modalities:  None today.  Previous imaging reviewed.  Independent visualization of the images was performed.      Impression: left knee posterior horn medial meniscus tear with underlying chondromalacia medial and patellofemoral compartment    Plan:  All of the above pertinent physical exam and imaging modalities findings was reviewed with Benjamín.  Discussed with patient and he would like to attempt knee arthroscopy to address the medial mensicus tear.  Discussed the underlying chondromalacia and limitations of knee arthroscopy with chondromalacia. Patient understands may not get relief of the symptoms with a knee scope especially in the pain is arising " more from the chondromalacia vs mensicus tear.  Patient would like to proceed with surgery vs repeat injection and PT.     The patient has attempted conservative treatments that include NSAIDs, ice steroid injections, activity modifications, time, rest yet still continues to have significant issues. These issues include progressive pain with activity, increasing difficulty at work due to pain. Secondary to these reasons I have offered surgery in the form of LEFT  partial medial menisectomy.    Risks, benefits, complications, limitations, and anticipated postoperative course were discussed. Risks including infections (requiring possible repeat surgery and antibiotics), bleeding, blood clots, cartilage degeneration (arthritis), scar, scar tenderness, stiffness, skin problems and failure to relieve all symptoms were reviewed. I also reviewed the risks of heart attack, stroke, death. Anticipated anesthesia is local with IV sedation.  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 All question were answered, surgery is planned in the near future.     Return to clinic 10 days post-operatively. , or sooner as needed for changes.  Re-x-ray on return: No    Scribed by:  Randell Sosa, APRN, CNP, DNP  3:14 PM  6/1/2018    I attest I have seen and evaluated the patient.  I agree with above impression and plan.  Richard Blanco D.O.          Again, thank you for allowing me to participate in the care of your patient.        Sincerely,        Dane Blanco, DO

## 2018-06-01 NOTE — TELEPHONE ENCOUNTER
"metaxalone (SKELAXIN) 800 MG tablet      Last Written Prescription Date:  5/29/2018  Last Fill Quantity: 90,   # refills: 2  Last Office Visit: 5/29/2018  Future Office visit:    Next 5 appointments (look out 90 days)     Jun 01, 2018  2:30 PM CDT   Return Visit with Dane Blanco DO   [facility] ([address] [id number] [phone]                   Routing refill request to provider for review/approval because:  RX was just filled on 5/29 with refills. Please deny this                                   atorvastatin (LIPITOR) 40 MG tablet [Pharmacy Med Name: ATORVASTATIN TABS 40MG] 90 tablet 1     Sig: TAKE 1 TABLET DAILY    Statins Protocol Passed    5/31/2018  7:58 PM       Passed - LDL on file in past 12 months    Recent Labs   Lab Test  05/29/18   1501   LDL  59            Passed - No abnormal creatine kinase in past 12 months    No lab results found.            Passed - Recent (12 mo) or future (30 days) visit within the authorizing provider's specialty    Patient had office visit in the last 12 months or has a visit in the next 30 days with authorizing provider or within the authorizing provider's specialty.  See \"Patient Info\" tab in inbasket, or \"Choose Columns\" in Meds & Orders section of the refill encounter.           Passed - Patient is age 18 or older        Last Written Prescription Date:  11/15/2017  Last Fill Quantity: 90,  # refills: 1   Last office visit: 5/29/2018 with prescribing provider:  5/29/2018   Future Office Visit:   Next 5 appointments (look out 90 days)     Jun 01, 2018  2:30 PM CDT   Return Visit with Dane Blanco DO   [facility] ([address] [id number] [phone]                   "

## 2018-06-01 NOTE — MR AVS SNAPSHOT
"              After Visit Summary   6/1/2018    Benjamín Mendoza    MRN: [id number]           Patient Information     Date Of Birth          1960        Visit Information        Provider Department      6/1/2018 2:30 PM Dane Blanco DO [facility]         Follow-ups after your visit        Your next 10 appointments already scheduled     Jun 01, 2018  2:30 PM CDT   Return Visit with Dane Blanco DO   [facility] ([facility]    [address] [place] [address] [phone]              Who to contact     If you have questions or need follow up information about today's clinic visit or your schedule please contact [facility] directly at [phone].  Normal or non-critical lab and imaging results will be communicated to you by MyChart, letter or phone within 4 business days after the clinic has received the results. If you do not hear from us within 7 days, please contact the clinic through MyChart or phone. If you have a critical or abnormal lab result, we will notify you by phone as soon as possible.  Submit refill requests through [employer] or call your pharmacy and they will forward the refill request to us. Please allow 3 business days for your refill to be completed.          Additional Information About Your Visit        Care EveryWhere ID     This is your Care EveryWhere ID. This could be used by other organizations to access your [facility] medical records  [id number]        Your Vitals Were     Height BMI (Body Mass Index)                1.778 m (5' 10\") 26.76 kg/m2           Blood Pressure from Last 3 Encounters:   06/01/18 130/56   05/29/18 130/56   02/05/18 106/60    Weight from Last 3 Encounters:   06/01/18 84.6 kg (186 lb 8 oz)   05/29/18 84.6 kg (186 lb 8 oz)   03/19/18 87.1 kg (192 lb)              Today, you had the following     No orders found for display       Primary Care Provider Office Phone # " Fax #    Aleida Larson PA-C [phone] [address] [place] [address]        Equal Access to Services     SYDNEE MARION : Hadii brandyn bonilla sapphireo Sovenessaali, waaxda luqadaha, qaybta kaalmada aderosetta, lincoln taoamie camila. So [facility] [phone].    ATENCIÓN: Si habla español, tiene a cortes disposición servicios gratuitos de asistencia lingüística. Llame al [phone].    We comply with applicable federal civil rights laws and [place] laws. We do not discriminate on the basis of race, color, national origin, age, disability, sex, sexual orientation, or gender identity.            Thank you!     Thank you for choosing [facility]  for your care. Our goal is always to provide you with excellent care. Hearing back from our patients is one way we can continue to improve our services. Please take a few minutes to complete the written survey that you may receive in the mail after your visit with us. Thank you!             Your Updated Medication List - Protect others around you: Learn how to safely use, store and throw away your medicines at www.disposemymeds.org.          This list is accurate as of 6/1/18  2:29 PM.  Always use your most recent med list.                   Brand Name Dispense Instructions for use Diagnosis    aspirin 81 MG EC tablet     60 tablet    Take 1 tablet (81 mg) by mouth daily    Hyperlipidemia, unspecified hyperlipidemia type       atorvastatin 40 MG tablet    LIPITOR    90 tablet    TAKE 1 TABLET DAILY    Hyperlipidemia, unspecified hyperlipidemia type       clonazePAM 2 MG tablet    klonoPIN    90 tablet    Take 1 tablet (2 mg) by mouth At Bedtime Reported on 2/27/2017    Sleep disorder       HORIZANT 600 MG tablet   Generic drug:  gabapentin enacarbil      Take 600 mg by mouth At Bedtime        metaxalone 800 MG tablet    SKELAXIN    90 tablet    TAKE 1 TABLET THREE TIMES A DAY    Chronic midline low back pain without sciatica       *  omeprazole 20 MG CR capsule    priLOSEC    180 capsule    Take 2 capsules (40 mg) by mouth daily    Gastroesophageal reflux disease without esophagitis       * omeprazole 20 MG CR capsule    priLOSEC    90 capsule    Take 1 capsule (20 mg) by mouth daily    Gastroesophageal reflux disease without esophagitis       ONE-A-DAY MENS PO      Take 1 tablet by mouth every evening        oxyCODONE-acetaminophen 5-325 MG per tablet    PERCOCET    90 tablet    Take 1-2 tablets by mouth every 4 hours as needed for pain (moderate to severe)    Chronic midline low back pain without sciatica       * Notice:  This list has 2 medication(s) that are the same as other medications prescribed for you. Read the directions carefully, and ask your doctor or other care provider to review them with you.

## 2018-06-04 NOTE — TELEPHONE ENCOUNTER
Called patient and advised he will need to come in for a pre-op informed that Dr. Chappell is out and scheduled with Dr. Boudreaux 6/7/18 at 7:40.  Tracy Moran MA

## 2018-06-07 ENCOUNTER — OFFICE VISIT (OUTPATIENT)
Dept: FAMILY MEDICINE | Facility: CLINIC | Age: 58
End: 2018-06-07
Payer: COMMERCIAL

## 2018-06-07 VITALS
BODY MASS INDEX: 27.03 KG/M2 | HEART RATE: 70 BPM | WEIGHT: 188.4 LBS | TEMPERATURE: 97.9 F | OXYGEN SATURATION: 99 % | DIASTOLIC BLOOD PRESSURE: 70 MMHG | SYSTOLIC BLOOD PRESSURE: 122 MMHG | RESPIRATION RATE: 16 BRPM

## 2018-06-07 DIAGNOSIS — Z01.818 PREOP GENERAL PHYSICAL EXAM: Primary | ICD-10-CM

## 2018-06-07 DIAGNOSIS — S83.204D OTHER TEAR OF MENISCUS OF LEFT KNEE, UNSPECIFIED MENISCUS, UNSPECIFIED WHETHER OLD OR CURRENT TEAR, SUBSEQUENT ENCOUNTER: ICD-10-CM

## 2018-06-07 PROCEDURE — 99214 OFFICE O/P EST MOD 30 MIN: CPT | Performed by: FAMILY MEDICINE

## 2018-06-07 ASSESSMENT — PAIN SCALES - GENERAL: PAINLEVEL: SEVERE PAIN (6)

## 2018-06-07 NOTE — NURSING NOTE
Chief Complaint   Patient presents with     Pre-Op Exam     Health Maintenance Due   Topic Date Due     HIV SCREEN (SYSTEM ASSIGNED)  03/04/1980     URINE DRUG SCREEN Q1 YR  08/10/2017     Jacey Rojas, [facility]

## 2018-06-07 NOTE — MR AVS SNAPSHOT
After Visit Summary   6/7/2018    Benjamín Mendoza    MRN: [id number]           Patient Information     Date Of Birth          1960        Visit Information        Provider Department      6/7/2018 7:40 AM Yariel Boudreaux MD [facility]        Today's Diagnoses     Preop general physical exam    -  1    Other tear of meniscus of left knee, unspecified meniscus, unspecified whether old or current tear, subsequent encounter          Care Instructions      Before Your Surgery      Call your surgeon if there is any change in your health. This includes signs of a cold or flu (such as a sore throat, runny nose, cough, rash or fever).    Do not smoke, drink alcohol or take over the counter medicine (unless your surgeon or primary care doctor tells you to) for the 24 hours before and after surgery.    If you take prescribed drugs: Follow your doctor s orders about which medicines to take and which to stop until after surgery.    Eating and drinking prior to surgery: follow the instructions from your surgeon    Take a shower or bath the night before surgery. Use the soap your surgeon gave you to gently clean your skin. If you do not have soap from your surgeon, use your regular soap. Do not shave or scrub the surgery site.  Wear clean pajamas and have clean sheets on your bed.           Follow-ups after your visit        Follow-up notes from your care team     Return if symptoms worsen or fail to improve.      Your next 10 appointments already scheduled     Jun 08, 2018   Procedure with Dane Blanco DO   [facility] ([facility])    [address] [place] [address] [address] [place] [address]   [phone]           From [address]: Exit at [address] on [address] side of [address]. Turn right on [address]. Turn left at stoplight on [address]. [facility] will be in view two blocks ahead            Jun 20, 2018  3:00 PM CDT   Return Visit with Dane  Chester Blanco, DO   [facility]    [address]  [address]  [facility] [address] [phone]              Who to contact     If you have questions or need follow up information about today's clinic visit or your schedule please contact [facility] directly at [phone].  Normal or non-critical lab and imaging results will be communicated to you by MyChart, letter or phone within 4 business days after the clinic has received the results. If you do not hear from us within 7 days, please contact the clinic through MyChart or phone. If you have a critical or abnormal lab result, we will notify you by phone as soon as possible.  Submit refill requests through [employer] or call your pharmacy and they will forward the refill request to us. Please allow 3 business days for your refill to be completed.          Additional Information About Your Visit        Care EveryWhere ID     This is your Care EveryWhere ID. This could be used by other organizations to access your [place] medical records  [id number]        Your Vitals Were     Pulse Temperature Respirations Pulse Oximetry BMI (Body Mass Index)       70 97.9  F (36.6  C) (Temporal) 16 99% 27.03 kg/m2        Blood Pressure from Last 3 Encounters:   06/07/18 122/70   06/01/18 130/56   05/29/18 130/56    Weight from Last 3 Encounters:   06/07/18 188 lb 6.4 oz (85.5 kg)   06/01/18 186 lb 8 oz (84.6 kg)   05/29/18 186 lb 8 oz (84.6 kg)              Today, you had the following     No orders found for display       Primary Care Provider Office Phone # Fax #    Aleida Larson PA-C [phone] [address] [place] [address]        Equal Access to Services     SYDNEE MARION : Cecille Mathews, liss salazar, qaausten kaalchristie rm, lincoln jensen. So [facility] [phone].    ATENCIÓN: Si habla español, tiene a cortes disposición servicios  chadd de asistencia lingüística. Le jay [phone].    We comply with applicable federal civil rights laws and [place] laws. We do not discriminate on the basis of race, color, national origin, age, disability, sex, sexual orientation, or gender identity.            Thank you!     Thank you for choosing [facility]  for your care. Our goal is always to provide you with excellent care. Hearing back from our patients is one way we can continue to improve our services. Please take a few minutes to complete the written survey that you may receive in the mail after your visit with us. Thank you!             Your Updated Medication List - Protect others around you: Learn how to safely use, store and throw away your medicines at www.disposemymeds.org.          This list is accurate as of 6/7/18  8:24 AM.  Always use your most recent med list.                   Brand Name Dispense Instructions for use Diagnosis    aspirin 81 MG EC tablet     60 tablet    Take 1 tablet (81 mg) by mouth daily    Hyperlipidemia, unspecified hyperlipidemia type       atorvastatin 40 MG tablet    LIPITOR    90 tablet    TAKE 1 TABLET DAILY    Hyperlipidemia, unspecified hyperlipidemia type       clonazePAM 2 MG tablet    klonoPIN    90 tablet    Take 1 tablet (2 mg) by mouth At Bedtime Reported on 2/27/2017    Sleep disorder       HORIZANT 600 MG tablet   Generic drug:  gabapentin enacarbil      Take 600 mg by mouth At Bedtime        metaxalone 800 MG tablet    SKELAXIN    90 tablet    TAKE 1 TABLET THREE TIMES A DAY    Chronic midline low back pain without sciatica       omeprazole 20 MG CR capsule    priLOSEC    90 capsule    Take 1 capsule (20 mg) by mouth daily    Gastroesophageal reflux disease without esophagitis       ONE-A-DAY MENS PO      Take 1 tablet by mouth every evening        oxyCODONE-acetaminophen 5-325 MG per tablet    PERCOCET    90 tablet    Take 1-2 tablets by mouth every 4 hours as needed for pain  (moderate to severe)    Chronic midline low back pain without sciatica

## 2018-06-07 NOTE — PROGRESS NOTES
[address] [address]  [phone]  Dept: [phone]    PRE-OP EVALUATION:  Today's date: 2018    Benjamín Mendoza (: 1960) presents for pre-operative evaluation assessment as requested by Dr. Blanco.  He requires evaluation and anesthesia risk assessment prior to undergoing surgery/procedure for treatment of Torn Meniscus     .    Proposed Surgery/ Procedure: Left Knee Arthroscopy With Medial Meniscetomy  Date of Surgery/ Procedure: 18  Time of Surgery/ Procedure: 1:50 PM  Hospital/Surgical Facility: [facility]  Primary Physician: Aleida Larson  Type of Anesthesia Anticipated: to be determined    Patient has a Health Care Directive or Living Will:  YES     1. NO - Do you have a history of heart attack, stroke, stent, bypass or surgery on an artery in the head, neck, heart or legs?  2. NO - Do you ever have any pain or discomfort in your chest?  3. NO - Do you have a history of  Heart Failure?  4. NO - Are you troubled by shortness of breath when: walking on the level, up a slight hill or at night?  5. NO - Do you currently have a cold, bronchitis or other respiratory infection?  6. NO - Do you have a cough, shortness of breath or wheezing?  7. NO - Do you sometimes get pains in the calves of your legs when you walk?  8. NO - Do you or anyone in your family have previous history of blood clots?  9. NO - Do you or does anyone in your family have a serious bleeding problem such as prolonged bleeding following surgeries or cuts?  10. NO - Have you ever had problems with anemia or been told to take iron pills?  11. NO - Have you had any abnormal blood loss such as black, tarry or bloody stools, or abnormal vaginal bleeding?  12. NO - Have you ever had a blood transfusion?  13. NO - Have you or any of your relatives ever had problems with anesthesia?  14. YES - Do you have sleep apnea, excessive snoring or daytime drowsiness?   Excessive Snoring  15. NO - Do you have any prosthetic heart valves?  16. NO - Do you have prosthetic joints?  17. NO - Is there any chance that you may be pregnant?      HPI:     HPI related to upcoming procedure:     Benjamín is here today for preop physical.  He is scheduled for left knee arthroscopy with Dr. Blanco tomorrow for left medial meniscectomy.  He has left knee pain for several months that failed the conservative management which include steroid injection.  MRI showed tear of the posterior horn of the medial meniscal extending to the inferior articular surface.   It expected be the same day procedure with general anesthesia. There is no personal or family history of anesthesia complication. There is no family or personal history of pre-matured CAD or MI. Generally is healthy, takes Lipitor for high cholesterol, Horizon and Klonopin for RLS.  Has not been on steroid orally in the last 6 months.  He last took the Aspirin or any NSAID products was 4 days ago. Not on any form of blood thinner.  Stated that he was well informed about the procedure and is ready to have the procedure done.    He generally is doing well and has no concern today. No headache or dizziness. No URI symptoms include running nose, nasal congestion, ST, coughing, fever or chill.  No chest pain or SOB.  No N/V/D/C and denies of having problem with urination.  He never smokes and denies of having breathing problem.  No histories of asthma or COPD.  Head has a histories of obstructive sleep apnea which resolved after the septal deviation surgically repair.        MEDICAL HISTORY:     Patient Active Problem List    Diagnosis Date Noted     Gastroesophageal reflux disease without esophagitis 05/29/2018     Priority: Medium     Chondromalacia, knee, left 02/12/2018     Priority: Medium     Other tear of medial meniscus of left knee as current injury, initial encounter 02/12/2018     Priority: Medium     Advanced directives,  counseling/discussion 04/03/2017     Priority: Medium     Gave pt information on 04/03/17.  Reina Villatoro, [facility]         Insomnia, unspecified type 08/10/2016     Priority: Medium     Chronic midline low back pain without sciatica 08/10/2016     Priority: Medium     Patient is followed by ERIBERTO KONG for ongoing prescription of pain medication.  All refills should be approved by this provider, or covering partner.    Medication(s): Oxycodone 5-3 25.   Maximum quantity per month: 90 for 3 months  Clinic visit frequency required: Q 6  months     Controlled substance agreement:  Encounter-Level CSA:     There are no encounter-level csa.      letter updated August 10, 2016   Pain Clinic evaluation in the past: No    DIRE Total Score(s):  No flowsheet data found.    Last [facility] website verification:  done on august 10,2016   [email or web address]       Chronic pain syndrome 08/10/2016     Priority: Medium     Tinnitus of both ears 01/20/2015     Priority: Medium     CARY (obstructive sleep apnea) 02/03/2010     Priority: Medium     PDS 3/2009 AHI 26 Usha 84%        Headache 03/26/2007     Priority: Medium     Patient is followed by NIRAV MONAE for ongoing prescription of narcotic pain medicine.  Med: percocet.   Maximum use per month: 15-30  Expected duration: ongoing  Narcotic agreement on file: YES  Clinic visit recommended: Q 6  Months    Patient is followed by ERIBERTO KONG for ongoing prescription of narcotic pain medicine.  Med: Percocet.   Maximum use per month:  15-30  Expected duration: Chronic  Narcotic agreement on file: YES  Clinic visit recommended: Q 6  Months  January 31, 2013     Problem list name updated by automated process. Provider to review        Past Medical History:   Diagnosis Date     Chronic back pain     percocet, spinal fluid leak dx but never visible     Chronic headaches      Esophageal reflux      GERD (gastroesophageal reflux disease)       Motion sickness      CARY (obstructive sleep apnea) 3/2009    AHI 26 Ndir 84%     RLS (restless legs syndrome)      Past Surgical History:   Procedure Laterality Date     COLONOSCOPY  06/12/07     COLONOSCOPY N/A 7/12/2017    Procedure: COLONOSCOPY;  colonoscopy;  Surgeon: Lake Mueller MD;  Location:  GI     HC TRABECULOPLASTY BY LASER SURGERY  2000    LASIK     SEPTOPLASTY, TURBINOPLASTY, COMBINED N/A 4/26/2016    Procedure: COMBINED SEPTOPLASTY, TURBINOPLASTY;  Surgeon: Ken Molina MD;  Location:  OR     Formerly [facility]       Current Outpatient Prescriptions   Medication Sig Dispense Refill     atorvastatin (LIPITOR) 40 MG tablet TAKE 1 TABLET DAILY 90 tablet 3     clonazePAM (KLONOPIN) 2 MG tablet Take 1 tablet (2 mg) by mouth At Bedtime Reported on 2/27/2017 90 tablet 0     HORIZANT 600 MG tablet Take 600 mg by mouth At Bedtime       metaxalone (SKELAXIN) 800 MG tablet TAKE 1 TABLET THREE TIMES A DAY 90 tablet 2     Multiple Vitamin (ONE-A-DAY MENS PO) Take 1 tablet by mouth every evening        omeprazole (PRILOSEC) 20 MG CR capsule Take 1 capsule (20 mg) by mouth daily 90 capsule 3     oxyCODONE-acetaminophen (PERCOCET) 5-325 MG per tablet Take 1-2 tablets by mouth every 4 hours as needed for pain (moderate to severe) 90 tablet 0     aspirin EC 81 MG EC tablet Take 1 tablet (81 mg) by mouth daily (Patient not taking: Reported on 6/7/2018) 60 tablet 0     OTC products: None, except as noted above    Allergies   Allergen Reactions     Sumatriptan Nausea and Vomiting     Migraine medications      Latex Allergy: NO    Social History   Substance Use Topics     Smoking status: Never Smoker     Smokeless tobacco: Never Used      Comment: no smokers in household     Alcohol use No     History   Drug Use No       REVIEW OF SYSTEMS:   Constitutional, neuro, ENT, endocrine, pulmonary, cardiac, gastrointestinal, genitourinary, musculoskeletal, integument and psychiatric systems are negative, except as  otherwise noted.    EXAM:   /70 (BP Location: Right arm, Patient Position: Sitting, Cuff Size: Adult Regular)  Pulse 70  Temp 97.9  F (36.6  C) (Temporal)  Resp 16  Wt 188 lb 6.4 oz (85.5 kg)  SpO2 99%  BMI 27.03 kg/m2      GENERAL APPEARANCE: healthy, alert and no distress     EYES: EOMI,- PERRL     HENT: ear canals and TM's normal and nose and mouth without ulcers or lesions. Nares are non-congested. Oropharynx is pink and moist. No tender with palpation to the sinuses.     NECK: no adenopathy, no asymmetry and thyroid normal to palpation.  No tender with palpation to the cervical spine and its para-spinous muscle bilaterally.     RESP: lungs clear to auscultation - no rales, rhonchi or wheezes     CV: regular rates and rhythm and no murmur.     ABDOMEN:  soft, nontender, no palpable masses and bowel sounds normal     MS: extremities normal- no gross deformities noted.  No edema.  All 4 extremities  are equally in strength.     NEURO: Normal strength and tone,  mentation intact and speech normal     PSYCH: mentation appears normal. and affect normal/bright     LYMPHATICS: No cervical adenopathy.      DIAGNOSTICS:     EKG: Not indicated due to non-vascular surgery and low risk of event (age <65 and without cardiac risk factors)    Labs Resulted Today:   Results for orders placed or performed during the hospital encounter of 05/29/18   XR Lumbar Spine 2/3 Views    Narrative    LUMBAR SPINE TWO-THREE VIEWS   5/29/2018 3:45 PM     HISTORY: Chronic midline low back pain without sciatica.     COMPARISON: None.    FINDINGS:  There are five non-rib bearing lumbar type vertebrae. Mild  broad-based levoconvex curvature of the lumbar spine is centered at  L3. There is disc height loss at L3-L4. Otherwise, the vertebral  bodies, pedicles, disc spaces, perivertebral soft tissues, alignment,  and sacroiliac joints are normal in appearance.  No evidence for  fracture .       Impression    IMPRESSION:    1.  Degenerative disc disease at L3-L4.  2. Levoconvex curvature of the lumbar spine centered at L3-L4 is  likely degenerative in etiology.  4. No acute fracture or malalignment.    LILIANA RAYMOND MD     Labs Drawn and in Process:   Unresulted Labs Ordered in the Past 30 Days of this Admission     No orders found from 4/8/2018 to 6/8/2018.          Recent Labs   Lab Test  04/03/17   1943  04/03/17   0140  04/12/16   1307  10/14/15   1407   HGB  13.8   --   13.5  14.3   PLT  141*   --   141*  143*   NA  144   --    --   137   POTASSIUM  3.7   --    --   3.7   CR  0.86   --    --   0.68   A1C   --   5.3   --    --         IMPRESSION:   Reason for surgery/procedure: Meniscal tear  Diagnosis/reason for consult: pre-op physical to evaluate for anesthesia and its sis-operative risks.    The proposed surgical procedure is considered INTERMEDIATE risk.    REVISED CARDIAC RISK INDEX  The patient has the following serious cardiovascular risks for perioperative complications such as (MI, PE, VFib and 3  AV Block):  No serious cardiac risks  INTERPRETATION: 0 risks: Class I (very low risk - 0.4% complication rate)    The patient has the following additional risks for perioperative complications:  No identified additional risks      ICD-10-CM    1. Preop general physical exam Z01.818    2. Other tear of meniscus of left knee, unspecified meniscus, unspecified whether old or current tear, subsequent encounter S83.204D        RECOMMENDATIONS:       Cardiovascular Risk  No risk identified      Pulmonary Risk  No risk identified      Obstructive Sleep Apnea (or suspected sleep apnea)  Has a history of sleep apnea which had resolved after the septal deviation repair.      Anemia  No risk identified      --Patient is to take all scheduled medications on the day of surgery EXCEPT for modifications listed below.    Anticoagulant or Antiplatelet Medication Use  ASPIRIN: To be held until after the surgery.  NSAIDS: To be held until after the  surgery        APPROVAL GIVEN to proceed with proposed procedure, without further diagnostic evaluation    Benjamín is overall doing well.  He is clear for the procedure as scheduled.  No further work up is needed.  Instructed him to fast at least 8 hrs before the procedure time. Not take any blood thinner.   I recommend to stay away from ASA and NSAIDs until after the surgery. I went over his medication list with instructions -may take all them as prescribed but hold them off on am of the procedure. May resume all of his medications after the surgery.  A written instruction was given as well.  Recommend appropriate DVT prophylactic during and after the surgery per the surgeon's recommendation.  All of his questions were answered.         Signed Electronically by: Yariel Davis Mai, MD    Copy of this evaluation report is provided to requesting physician.    [place] Preop Guidelines    Revised Cardiac Risk Index

## 2018-06-08 ENCOUNTER — ANESTHESIA EVENT (OUTPATIENT)
Dept: SURGERY | Facility: CLINIC | Age: 58
End: 2018-06-08
Payer: COMMERCIAL

## 2018-06-08 ENCOUNTER — SURGERY (OUTPATIENT)
Age: 58
End: 2018-06-08

## 2018-06-08 ENCOUNTER — ANESTHESIA (OUTPATIENT)
Dept: SURGERY | Facility: CLINIC | Age: 58
End: 2018-06-08
Payer: COMMERCIAL

## 2018-06-08 ENCOUNTER — HOSPITAL ENCOUNTER (OUTPATIENT)
Facility: CLINIC | Age: 58
Discharge: HOME | End: 2018-06-08
Attending: ORTHOPAEDIC SURGERY | Admitting: ORTHOPAEDIC SURGERY
Payer: COMMERCIAL

## 2018-06-08 ENCOUNTER — NURSE TRIAGE (OUTPATIENT)
Dept: NURSING | Facility: CLINIC | Age: 58
End: 2018-06-08

## 2018-06-08 VITALS
RESPIRATION RATE: 16 BRPM | TEMPERATURE: 97.8 F | DIASTOLIC BLOOD PRESSURE: 84 MMHG | HEIGHT: 70 IN | BODY MASS INDEX: 26.97 KG/M2 | WEIGHT: 188.4 LBS | SYSTOLIC BLOOD PRESSURE: 122 MMHG | OXYGEN SATURATION: 96 %

## 2018-06-08 DIAGNOSIS — S83.242A OTHER TEAR OF MEDIAL MENISCUS OF LEFT KNEE AS CURRENT INJURY, INITIAL ENCOUNTER: Primary | ICD-10-CM

## 2018-06-08 DIAGNOSIS — G89.29 CHRONIC MIDLINE LOW BACK PAIN WITHOUT SCIATICA: ICD-10-CM

## 2018-06-08 DIAGNOSIS — M54.50 CHRONIC MIDLINE LOW BACK PAIN WITHOUT SCIATICA: ICD-10-CM

## 2018-06-08 PROCEDURE — 37000009 ZZH ANESTHESIA TECHNICAL FEE, EACH ADDTL 15 MIN: Performed by: ORTHOPAEDIC SURGERY

## 2018-06-08 PROCEDURE — 25000128 H RX IP 250 OP 636: Performed by: NURSE ANESTHETIST, CERTIFIED REGISTERED

## 2018-06-08 PROCEDURE — 37000008 ZZH ANESTHESIA TECHNICAL FEE, 1ST 30 MIN: Performed by: ORTHOPAEDIC SURGERY

## 2018-06-08 PROCEDURE — 36000056 ZZH SURGERY LEVEL 3 1ST 30 MIN: Performed by: ORTHOPAEDIC SURGERY

## 2018-06-08 PROCEDURE — 25000128 H RX IP 250 OP 636: Performed by: ORTHOPAEDIC SURGERY

## 2018-06-08 PROCEDURE — 40000306 ZZH STATISTIC PRE PROC ASSESS II: Performed by: ORTHOPAEDIC SURGERY

## 2018-06-08 PROCEDURE — 25000132 ZZH RX MED GY IP 250 OP 250 PS 637: Performed by: ORTHOPAEDIC SURGERY

## 2018-06-08 PROCEDURE — 29881 ARTHRS KNE SRG MNISECTMY M/L: CPT | Mod: LT | Performed by: ORTHOPAEDIC SURGERY

## 2018-06-08 PROCEDURE — 25000125 ZZHC RX 250: Performed by: NURSE ANESTHETIST, CERTIFIED REGISTERED

## 2018-06-08 PROCEDURE — 27210794 ZZH OR GENERAL SUPPLY STERILE: Performed by: ORTHOPAEDIC SURGERY

## 2018-06-08 PROCEDURE — 71000027 ZZH RECOVERY PHASE 2 EACH 15 MINS: Performed by: ORTHOPAEDIC SURGERY

## 2018-06-08 PROCEDURE — 36000058 ZZH SURGERY LEVEL 3 EA 15 ADDTL MIN: Performed by: ORTHOPAEDIC SURGERY

## 2018-06-08 PROCEDURE — 25000125 ZZHC RX 250: Performed by: ORTHOPAEDIC SURGERY

## 2018-06-08 RX ORDER — PROPOFOL 10 MG/ML
INJECTION, EMULSION INTRAVENOUS CONTINUOUS PRN
Status: DISCONTINUED | OUTPATIENT
Start: 2018-06-08 | End: 2018-06-08

## 2018-06-08 RX ORDER — BUPIVACAINE HYDROCHLORIDE AND EPINEPHRINE 2.5; 5 MG/ML; UG/ML
INJECTION, SOLUTION EPIDURAL; INFILTRATION; INTRACAUDAL; PERINEURAL PRN
Status: DISCONTINUED | OUTPATIENT
Start: 2018-06-08 | End: 2018-06-08 | Stop reason: HOSPADM

## 2018-06-08 RX ORDER — SODIUM CHLORIDE, SODIUM LACTATE, POTASSIUM CHLORIDE, CALCIUM CHLORIDE 600; 310; 30; 20 MG/100ML; MG/100ML; MG/100ML; MG/100ML
INJECTION, SOLUTION INTRAVENOUS CONTINUOUS
Status: DISCONTINUED | OUTPATIENT
Start: 2018-06-08 | End: 2018-06-08 | Stop reason: HOSPADM

## 2018-06-08 RX ORDER — PROPOFOL 10 MG/ML
INJECTION, EMULSION INTRAVENOUS PRN
Status: DISCONTINUED | OUTPATIENT
Start: 2018-06-08 | End: 2018-06-08

## 2018-06-08 RX ORDER — LIDOCAINE HYDROCHLORIDE 20 MG/ML
INJECTION, SOLUTION INFILTRATION; PERINEURAL PRN
Status: DISCONTINUED | OUTPATIENT
Start: 2018-06-08 | End: 2018-06-08

## 2018-06-08 RX ORDER — HYDROMORPHONE HYDROCHLORIDE 1 MG/ML
.3-.5 INJECTION, SOLUTION INTRAMUSCULAR; INTRAVENOUS; SUBCUTANEOUS EVERY 10 MIN PRN
Status: DISCONTINUED | OUTPATIENT
Start: 2018-06-08 | End: 2018-06-08 | Stop reason: HOSPADM

## 2018-06-08 RX ORDER — DIMENHYDRINATE 50 MG/ML
25 INJECTION, SOLUTION INTRAMUSCULAR; INTRAVENOUS
Status: DISCONTINUED | OUTPATIENT
Start: 2018-06-08 | End: 2018-06-08 | Stop reason: HOSPADM

## 2018-06-08 RX ORDER — MORPHINE SULFATE 0.5 MG/ML
INJECTION, SOLUTION EPIDURAL; INTRATHECAL; INTRAVENOUS PRN
Status: DISCONTINUED | OUTPATIENT
Start: 2018-06-08 | End: 2018-06-08 | Stop reason: HOSPADM

## 2018-06-08 RX ORDER — KETOROLAC TROMETHAMINE 30 MG/ML
INJECTION, SOLUTION INTRAMUSCULAR; INTRAVENOUS PRN
Status: DISCONTINUED | OUTPATIENT
Start: 2018-06-08 | End: 2018-06-08

## 2018-06-08 RX ORDER — ONDANSETRON 2 MG/ML
INJECTION INTRAMUSCULAR; INTRAVENOUS PRN
Status: DISCONTINUED | OUTPATIENT
Start: 2018-06-08 | End: 2018-06-08

## 2018-06-08 RX ORDER — NALOXONE HYDROCHLORIDE 0.4 MG/ML
.1-.4 INJECTION, SOLUTION INTRAMUSCULAR; INTRAVENOUS; SUBCUTANEOUS
Status: DISCONTINUED | OUTPATIENT
Start: 2018-06-08 | End: 2018-06-08 | Stop reason: HOSPADM

## 2018-06-08 RX ORDER — CEFAZOLIN SODIUM 2 G/100ML
2 INJECTION, SOLUTION INTRAVENOUS
Status: COMPLETED | OUTPATIENT
Start: 2018-06-08 | End: 2018-06-08

## 2018-06-08 RX ORDER — FENTANYL CITRATE 50 UG/ML
25-50 INJECTION, SOLUTION INTRAMUSCULAR; INTRAVENOUS
Status: DISCONTINUED | OUTPATIENT
Start: 2018-06-08 | End: 2018-06-08 | Stop reason: HOSPADM

## 2018-06-08 RX ORDER — ONDANSETRON 2 MG/ML
4 INJECTION INTRAMUSCULAR; INTRAVENOUS EVERY 30 MIN PRN
Status: DISCONTINUED | OUTPATIENT
Start: 2018-06-08 | End: 2018-06-08 | Stop reason: HOSPADM

## 2018-06-08 RX ORDER — BUPIVACAINE HYDROCHLORIDE 2.5 MG/ML
INJECTION, SOLUTION EPIDURAL; INFILTRATION; INTRACAUDAL PRN
Status: DISCONTINUED | OUTPATIENT
Start: 2018-06-08 | End: 2018-06-08 | Stop reason: HOSPADM

## 2018-06-08 RX ORDER — METHOCARBAMOL 750 MG/1
750 TABLET, FILM COATED ORAL EVERY 6 HOURS PRN
Qty: 30 TABLET | Refills: 0 | Status: SHIPPED | OUTPATIENT
Start: 2018-06-08 | End: 2019-04-17

## 2018-06-08 RX ORDER — MEPERIDINE HYDROCHLORIDE 25 MG/ML
12.5 INJECTION INTRAMUSCULAR; INTRAVENOUS; SUBCUTANEOUS
Status: DISCONTINUED | OUTPATIENT
Start: 2018-06-08 | End: 2018-06-08 | Stop reason: HOSPADM

## 2018-06-08 RX ORDER — LIDOCAINE HYDROCHLORIDE AND EPINEPHRINE 10; 10 MG/ML; UG/ML
INJECTION, SOLUTION INFILTRATION; PERINEURAL PRN
Status: DISCONTINUED | OUTPATIENT
Start: 2018-06-08 | End: 2018-06-08 | Stop reason: HOSPADM

## 2018-06-08 RX ORDER — FENTANYL CITRATE 50 UG/ML
INJECTION, SOLUTION INTRAMUSCULAR; INTRAVENOUS PRN
Status: DISCONTINUED | OUTPATIENT
Start: 2018-06-08 | End: 2018-06-08

## 2018-06-08 RX ORDER — OXYCODONE AND ACETAMINOPHEN 5; 325 MG/1; MG/1
1-2 TABLET ORAL EVERY 4 HOURS PRN
Qty: 90 TABLET | Refills: 0
Start: 2018-06-08 | End: 2019-05-01

## 2018-06-08 RX ORDER — OXYCODONE HYDROCHLORIDE 5 MG/1
5-10 TABLET ORAL EVERY 4 HOURS PRN
Qty: 30 TABLET | Refills: 0 | Status: SHIPPED | OUTPATIENT
Start: 2018-06-08 | End: 2019-01-02

## 2018-06-08 RX ORDER — ONDANSETRON 4 MG/1
4 TABLET, ORALLY DISINTEGRATING ORAL EVERY 30 MIN PRN
Status: DISCONTINUED | OUTPATIENT
Start: 2018-06-08 | End: 2018-06-08 | Stop reason: HOSPADM

## 2018-06-08 RX ORDER — CEFAZOLIN SODIUM 1 G/3ML
1 INJECTION, POWDER, FOR SOLUTION INTRAMUSCULAR; INTRAVENOUS SEE ADMIN INSTRUCTIONS
Status: DISCONTINUED | OUTPATIENT
Start: 2018-06-08 | End: 2018-06-08 | Stop reason: HOSPADM

## 2018-06-08 RX ORDER — OXYCODONE HYDROCHLORIDE 5 MG/1
5 TABLET ORAL
Status: COMPLETED | OUTPATIENT
Start: 2018-06-08 | End: 2018-06-08

## 2018-06-08 RX ADMIN — CEFAZOLIN SODIUM 2 G: 2 INJECTION, SOLUTION INTRAVENOUS at 14:04

## 2018-06-08 RX ADMIN — OXYCODONE HYDROCHLORIDE 5 MG: 5 TABLET ORAL at 15:11

## 2018-06-08 RX ADMIN — BUPIVACAINE HYDROCHLORIDE 20 ML: 2.5 INJECTION, SOLUTION EPIDURAL; INFILTRATION; INTRACAUDAL; PERINEURAL at 14:24

## 2018-06-08 RX ADMIN — MORPHINE SULFATE 10 MG: 0.5 INJECTION, SOLUTION EPIDURAL; INTRATHECAL; INTRAVENOUS at 14:24

## 2018-06-08 RX ADMIN — FENTANYL CITRATE 50 MCG: 50 INJECTION, SOLUTION INTRAMUSCULAR; INTRAVENOUS at 13:58

## 2018-06-08 RX ADMIN — PROPOFOL 50 MG: 10 INJECTION, EMULSION INTRAVENOUS at 13:59

## 2018-06-08 RX ADMIN — SODIUM CHLORIDE, POTASSIUM CHLORIDE, SODIUM LACTATE AND CALCIUM CHLORIDE: 600; 310; 30; 20 INJECTION, SOLUTION INTRAVENOUS at 13:25

## 2018-06-08 RX ADMIN — MIDAZOLAM 1 MG: 1 INJECTION INTRAMUSCULAR; INTRAVENOUS at 13:55

## 2018-06-08 RX ADMIN — LIDOCAINE HYDROCHLORIDE AND EPINEPHRINE 30 ML: 10; 10 INJECTION, SOLUTION INFILTRATION; PERINEURAL at 14:11

## 2018-06-08 RX ADMIN — LIDOCAINE HYDROCHLORIDE 60 MG: 20 INJECTION, SOLUTION INFILTRATION; PERINEURAL at 13:57

## 2018-06-08 RX ADMIN — LIDOCAINE HYDROCHLORIDE 1 ML: 10 INJECTION, SOLUTION EPIDURAL; INFILTRATION; INTRACAUDAL; PERINEURAL at 13:25

## 2018-06-08 RX ADMIN — FENTANYL CITRATE 50 MCG: 50 INJECTION, SOLUTION INTRAMUSCULAR; INTRAVENOUS at 13:57

## 2018-06-08 RX ADMIN — PROPOFOL 100 MCG/KG/MIN: 10 INJECTION, EMULSION INTRAVENOUS at 13:58

## 2018-06-08 RX ADMIN — ONDANSETRON 4 MG: 2 INJECTION INTRAMUSCULAR; INTRAVENOUS at 14:06

## 2018-06-08 RX ADMIN — BUPIVACAINE HYDROCHLORIDE AND EPINEPHRINE BITARTRATE 30 ML: 2.5; .0091 INJECTION, SOLUTION EPIDURAL; INFILTRATION; INTRACAUDAL; PERINEURAL at 14:11

## 2018-06-08 RX ADMIN — MIDAZOLAM 1 MG: 1 INJECTION INTRAMUSCULAR; INTRAVENOUS at 13:57

## 2018-06-08 RX ADMIN — KETOROLAC TROMETHAMINE 30 MG: 30 INJECTION, SOLUTION INTRAMUSCULAR at 14:22

## 2018-06-08 NOTE — ANESTHESIA CARE TRANSFER NOTE
Patient: Benjamín Mendoza    Procedure(s):  Left knee arthroscopy with arthroscopic partial meniscectomy - Wound Class: I-Clean    Diagnosis: Left knee meniscus tear  Diagnosis Additional Information: No value filed.    Anesthesia Type:   MAC     Note:  Airway :Room Air  Patient transferred to:Phase II        Vitals: (Last set prior to Anesthesia Care Transfer)    CRNA VITALS  6/8/2018 1405 - 6/8/2018 1440      6/8/2018             Pulse: 69    SpO2: 99 %    Resp Rate (observed): 9                Electronically Signed By: MYRTLE Alfaro CRNA  June 8, 2018  2:40 PM

## 2018-06-08 NOTE — ANESTHESIA PREPROCEDURE EVALUATION
Anesthesia Evaluation     . Pt has had prior anesthetic. Type: General           ROS/MED HX    ENT/Pulmonary:     (+)sleep apnea, CARY risk factors snores loudly, daytime somnolence, uses CPAP , . Other pulmonary disease Severe sinus congestion - SOB with exertion.    Neurologic:     (+)migraines,     Cardiovascular:     (+) Dyslipidemia, ----. : . . . :. .       METS/Exercise Tolerance:     Hematologic:  - neg hematologic  ROS       Musculoskeletal:  - neg musculoskeletal ROS       GI/Hepatic:     (+) GERD Asymptomatic on medication,       Renal/Genitourinary:  - ROS Renal section negative       Endo:  - neg endo ROS       Psychiatric:  - neg psychiatric ROS       Infectious Disease:  - neg infectious disease ROS       Malignancy:      - no malignancy   Other:    (+) H/O Chronic Pain,                   Physical Exam  Normal systems: cardiovascular, pulmonary and dental    Airway   Mallampati: II  TM distance: >3 FB    Dental     Cardiovascular   Rhythm and rate: regular and normal      Pulmonary    breath sounds clear to auscultation                    Anesthesia Plan      History & Physical Review  History and physical reviewed and following examination; no interval change.    ASA Status:  2 .    NPO Status:  > 6 hours    Plan for MAC with Propofol induction. Maintenance will be TIVA.  Reason for MAC:  Deep or markedly invasive procedure (G8)  PONV prophylaxis:  Ondansetron (or other 5HT-3) and Dexamethasone or Solumedrol       Postoperative Care  Postoperative pain management:  IV analgesics.      Consents  Anesthetic plan, risks, benefits and alternatives discussed with:  Patient.  Use of blood products discussed: No .   .                          .

## 2018-06-08 NOTE — IP AVS SNAPSHOT
[facility] Phase II    [address] [place] [address]    Phone:  [phone]                                       After Visit Summary   6/8/2018    Benjamín Mendoza    MRN: [id number]           After Visit Summary Signature Page     I have received my discharge instructions, and my questions have been answered. I have discussed any challenges I see with this plan with the nurse or doctor.    ..........................................................................................................................................  Patient/Patient Representative Signature      ..........................................................................................................................................  Patient Representative Print Name and Relationship to Patient    ..................................................               ................................................  Date                                            Time    ..........................................................................................................................................  Reviewed by Signature/Title    ...................................................              ..............................................  Date                                                            Time

## 2018-06-08 NOTE — INTERVAL H&P NOTE
This H&P has been reviewed and there are no clinically significant changes in the patient s condition.  The patient was evaluated by myself as well as Dr. Yariel Boudreaux prior to surgery. The Patient is approved for the surgery and the stated surgical procedure is still clinically indicated.

## 2018-06-08 NOTE — OP NOTE
Procedure Date: 06/08/2018      PREOPERATIVE DIAGNOSIS:  Left knee medial meniscus tear and underlying chondromalacia.      POSTOPERATIVE DIAGNOSIS:  Left knee medial meniscus tear and underlying chondromalacia.      PROCEDURE:  Left knee arthroscopy with arthroscopic partial medial meniscectomy.      SURGEON:  Dane Blanco DO      FIRST ASSISTANT:  Max Freeman      ANESTHESIA:  Local with IV sedation.      COMPLICATIONS:  None.      ESTIMATED BLOOD LOSS:  Less than 10 mL      FLUIDS:  800 mL crystalloids.      COUNTS:  Correct.      DISPOSITION:  PACU in stable condition.      GROSS FINDINGS:  The patellofemoral compartment had some fraying and softening at articular cartilage with a fissure along the trochlea, grade 2 with an area of grade 3 changes along the fissure in the trochlea.  There was some free articular cartilage floating in the medial gutter.  This was removed.  The medial compartment showed a complex tear of the posterior horn of medial meniscus extending into the body.  Medial femoral condyle had some softening with some fissuring, however, no full thickness chondromalacia.  ACL was stable with probing.  Lateral compartment just had some minimal softening in the articular cartilage.      INDICATIONS:  Benjamín Mendoza is a pleasant 58-year-old male with the complaint of left knee pain.  This pain has been present dating back well over a year.  Pain had been on and off.  He was subsequently seen in the office and we discussed his options.  MRI did show some underlying chondromalacia with a medial meniscus tear.  So we attempted some conservative therapy including rest, activity modifications, anti-inflammatories, steroid injections.  Unfortunately, steroid injections proved to provide him rather short-term improvement in his symptoms.  The pain would return with discomfort.  We discussed his options.  Given his failure of conservative care with pain impacting the quality of his life, we discussed  proceeding with knee arthroscopy.  We did have a discussion about the limitations of the arthroscopy in the setting of chondromalacia.  Risks and benefits were reviewed.  Once reviewed, he opted to proceed surgically.      DETAILS OF PROCEDURE:  He was met preoperatively.  Again, informed consent was verified and appropriate extremity was marked.  He was wheeled to OP suite #1.  He was transferred from the cart with no issues.  When deemed appropriate by Anesthesia, under aseptic conditions after a timeout was performed, the left knee was anesthetized with a mixture of lidocaine and Marcaine with epinephrine.  The left lower extremity was then sterilely prepped and draped in normal manner.  Prior to incision, a secondary timeout was performed.  A lateral portal was established.  The trocar was gradually and easily introduced intra-articularly with no significant resistance.  The scope was then introduced.  The above gross finding changes were noted.  An accessory medial portal was established.  The articular structures were probed.  The patellofemoral compartment was noted.  The chondromalacia, however, there was no significant loose flaps.  Medial gutter did have some loose articular fragments.  I did introduce a shaver and removed these.  The medial meniscus had a complex tear extending into the posterior horn.  I utilized a shaver and a biter to smooth this back to a stable margin.  ACL was stable with probing.  Lateral compartment showed no significant pathology with probing.  The knee was copiously irrigated, suctioned dry, and instruments were removed.  Portal sites were closed with nylon.  A sterile bandage was applied.  He was subsequently transferred to the PACU in stable condition to be discharged home with oxycodone and Robaxin for pain.  Followup appointment has been scheduled, discharge instructions provided.         WOLF CONTI DO             D: 06/08/2018   T: 06/08/2018   MT: LATOSHA      Name:      LILIANA AGUILAR   MRN:      [id number]        Account:        [id number]   :      1960           Procedure Date: 2018      Document: [id number]

## 2018-06-08 NOTE — ANESTHESIA POSTPROCEDURE EVALUATION
Patient: Benjamín Mendoza    Procedure(s):  Left knee arthroscopy with arthroscopic partial meniscectomy - Wound Class: I-Clean    Diagnosis:Left knee meniscus tear  Diagnosis Additional Information: No value filed.    Anesthesia Type:  MAC    Note:  Anesthesia Post Evaluation    Patient location during evaluation: Phase 2  Patient participation: Able to fully participate in evaluation  Pain management: adequate  Airway patency: patent  Cardiovascular status: blood pressure returned to baseline  Respiratory status: spontaneous ventilation and room air  Hydration status: acceptable  PONV: none     Anesthetic complications: None    Comments: Patient is resting without complaint. He was pleased with his anesthetic today.  No anesthesia concerns.         Last vitals:  Vitals:    06/08/18 1303   BP: (!) 135/92   Resp: 16   Temp: 97.8  F (36.6  C)   SpO2: 98%         Electronically Signed By: MYRTLE Alfaro CRNA  June 8, 2018  2:48 PM

## 2018-06-08 NOTE — BRIEF OP NOTE
[facility] Brief Operative Note    Pre-operative diagnosis: Left knee meniscus tear and chondromalacia    Post-operative diagnosis: left knee medial menicus tear, patelofemoral and MFC chondromalacia    Procedure: Procedure(s):  ARTHROSCOPY KNEE WITH MEDIAL MENISCECTOMY   Surgeon: Dane Blanco DO   Assistant(s): Max Freeman Scrub Tech   Estimated blood loss:  Fluids: Less than 10 ml  800 ml Crystalloids   Specimens: None   Findings: See dictated operative report for full details [id number]     Richard Blanco D.O.

## 2018-06-08 NOTE — H&P (VIEW-ONLY)
[address] [address]  [phone]  Dept: [phone]    PRE-OP EVALUATION:  Today's date: 2018    Benjamín Mendoza (: 1960) presents for pre-operative evaluation assessment as requested by Dr. Blanco.  He requires evaluation and anesthesia risk assessment prior to undergoing surgery/procedure for treatment of Torn Meniscus     .    Proposed Surgery/ Procedure: Left Knee Arthroscopy With Medial Meniscetomy  Date of Surgery/ Procedure: 18  Time of Surgery/ Procedure: 1:50 PM  Hospital/Surgical Facility: [facility]  Primary Physician: Aleida Larson  Type of Anesthesia Anticipated: to be determined    Patient has a Health Care Directive or Living Will:  YES     1. NO - Do you have a history of heart attack, stroke, stent, bypass or surgery on an artery in the head, neck, heart or legs?  2. NO - Do you ever have any pain or discomfort in your chest?  3. NO - Do you have a history of  Heart Failure?  4. NO - Are you troubled by shortness of breath when: walking on the level, up a slight hill or at night?  5. NO - Do you currently have a cold, bronchitis or other respiratory infection?  6. NO - Do you have a cough, shortness of breath or wheezing?  7. NO - Do you sometimes get pains in the calves of your legs when you walk?  8. NO - Do you or anyone in your family have previous history of blood clots?  9. NO - Do you or does anyone in your family have a serious bleeding problem such as prolonged bleeding following surgeries or cuts?  10. NO - Have you ever had problems with anemia or been told to take iron pills?  11. NO - Have you had any abnormal blood loss such as black, tarry or bloody stools, or abnormal vaginal bleeding?  12. NO - Have you ever had a blood transfusion?  13. NO - Have you or any of your relatives ever had problems with anesthesia?  14. YES - Do you have sleep apnea, excessive snoring or daytime drowsiness?   Excessive Snoring  15. NO - Do you have any prosthetic heart valves?  16. NO - Do you have prosthetic joints?  17. NO - Is there any chance that you may be pregnant?      HPI:     HPI related to upcoming procedure:     Benjamín is here today for preop physical.  He is scheduled for left knee arthroscopy with Dr. Blanco tomorrow for left medial meniscectomy.  He has left knee pain for several months that failed the conservative management which include steroid injection.  MRI showed tear of the posterior horn of the medial meniscal extending to the inferior articular surface.   It expected be the same day procedure with general anesthesia. There is no personal or family history of anesthesia complication. There is no family or personal history of pre-matured CAD or MI. Generally is healthy, takes Lipitor for high cholesterol, Horizon and Klonopin for RLS.  Has not been on steroid orally in the last 6 months.  He last took the Aspirin or any NSAID products was 4 days ago. Not on any form of blood thinner.  Stated that he was well informed about the procedure and is ready to have the procedure done.    He generally is doing well and has no concern today. No headache or dizziness. No URI symptoms include running nose, nasal congestion, ST, coughing, fever or chill.  No chest pain or SOB.  No N/V/D/C and denies of having problem with urination.  He never smokes and denies of having breathing problem.  No histories of asthma or COPD.  Head has a histories of obstructive sleep apnea which resolved after the septal deviation surgically repair.        MEDICAL HISTORY:     Patient Active Problem List    Diagnosis Date Noted     Gastroesophageal reflux disease without esophagitis 05/29/2018     Priority: Medium     Chondromalacia, knee, left 02/12/2018     Priority: Medium     Other tear of medial meniscus of left knee as current injury, initial encounter 02/12/2018     Priority: Medium     Advanced directives,  counseling/discussion 04/03/2017     Priority: Medium     Gave pt information on 04/03/17.  Reina Villatoro, [facility]         Insomnia, unspecified type 08/10/2016     Priority: Medium     Chronic midline low back pain without sciatica 08/10/2016     Priority: Medium     Patient is followed by ERIBERTO KONG for ongoing prescription of pain medication.  All refills should be approved by this provider, or covering partner.    Medication(s): Oxycodone 5-3 25.   Maximum quantity per month: 90 for 3 months  Clinic visit frequency required: Q 6  months     Controlled substance agreement:  Encounter-Level CSA:     There are no encounter-level csa.      letter updated August 10, 2016   Pain Clinic evaluation in the past: No    DIRE Total Score(s):  No flowsheet data found.    Last [facility] website verification:  done on august 10,2016   [email or web address]       Chronic pain syndrome 08/10/2016     Priority: Medium     Tinnitus of both ears 01/20/2015     Priority: Medium     CARY (obstructive sleep apnea) 02/03/2010     Priority: Medium     PDS 3/2009 AHI 26 Usha 84%        Headache 03/26/2007     Priority: Medium     Patient is followed by NIRAV MONAE for ongoing prescription of narcotic pain medicine.  Med: percocet.   Maximum use per month: 15-30  Expected duration: ongoing  Narcotic agreement on file: YES  Clinic visit recommended: Q 6  Months    Patient is followed by ERIBERTO KONG for ongoing prescription of narcotic pain medicine.  Med: Percocet.   Maximum use per month:  15-30  Expected duration: Chronic  Narcotic agreement on file: YES  Clinic visit recommended: Q 6  Months  January 31, 2013     Problem list name updated by automated process. Provider to review        Past Medical History:   Diagnosis Date     Chronic back pain     percocet, spinal fluid leak dx but never visible     Chronic headaches      Esophageal reflux      GERD (gastroesophageal reflux disease)       Motion sickness      CARY (obstructive sleep apnea) 3/2009    AHI 26 Ndir 84%     RLS (restless legs syndrome)      Past Surgical History:   Procedure Laterality Date     COLONOSCOPY  06/12/07     COLONOSCOPY N/A 7/12/2017    Procedure: COLONOSCOPY;  colonoscopy;  Surgeon: Lake Mueller MD;  Location:  GI     HC TRABECULOPLASTY BY LASER SURGERY  2000    LASIK     SEPTOPLASTY, TURBINOPLASTY, COMBINED N/A 4/26/2016    Procedure: COMBINED SEPTOPLASTY, TURBINOPLASTY;  Surgeon: Ken Molina MD;  Location:  OR     [facility]       Current Outpatient Prescriptions   Medication Sig Dispense Refill     atorvastatin (LIPITOR) 40 MG tablet TAKE 1 TABLET DAILY 90 tablet 3     clonazePAM (KLONOPIN) 2 MG tablet Take 1 tablet (2 mg) by mouth At Bedtime Reported on 2/27/2017 90 tablet 0     HORIZANT 600 MG tablet Take 600 mg by mouth At Bedtime       metaxalone (SKELAXIN) 800 MG tablet TAKE 1 TABLET THREE TIMES A DAY 90 tablet 2     Multiple Vitamin (ONE-A-DAY MENS PO) Take 1 tablet by mouth every evening        omeprazole (PRILOSEC) 20 MG CR capsule Take 1 capsule (20 mg) by mouth daily 90 capsule 3     oxyCODONE-acetaminophen (PERCOCET) 5-325 MG per tablet Take 1-2 tablets by mouth every 4 hours as needed for pain (moderate to severe) 90 tablet 0     aspirin EC 81 MG EC tablet Take 1 tablet (81 mg) by mouth daily (Patient not taking: Reported on 6/7/2018) 60 tablet 0     OTC products: None, except as noted above    Allergies   Allergen Reactions     Sumatriptan Nausea and Vomiting     Migraine medications      Latex Allergy: NO    Social History   Substance Use Topics     Smoking status: Never Smoker     Smokeless tobacco: Never Used      Comment: no smokers in household     Alcohol use No     History   Drug Use No       REVIEW OF SYSTEMS:   Constitutional, neuro, ENT, endocrine, pulmonary, cardiac, gastrointestinal, genitourinary, musculoskeletal, integument and psychiatric systems are negative, except as  otherwise noted.    EXAM:   /70 (BP Location: Right arm, Patient Position: Sitting, Cuff Size: Adult Regular)  Pulse 70  Temp 97.9  F (36.6  C) (Temporal)  Resp 16  Wt 188 lb 6.4 oz (85.5 kg)  SpO2 99%  BMI 27.03 kg/m2      GENERAL APPEARANCE: healthy, alert and no distress     EYES: EOMI,- PERRL     HENT: ear canals and TM's normal and nose and mouth without ulcers or lesions. Nares are non-congested. Oropharynx is pink and moist. No tender with palpation to the sinuses.     NECK: no adenopathy, no asymmetry and thyroid normal to palpation.  No tender with palpation to the cervical spine and its para-spinous muscle bilaterally.     RESP: lungs clear to auscultation - no rales, rhonchi or wheezes     CV: regular rates and rhythm and no murmur.     ABDOMEN:  soft, nontender, no palpable masses and bowel sounds normal     MS: extremities normal- no gross deformities noted.  No edema.  All 4 extremities  are equally in strength.     NEURO: Normal strength and tone,  mentation intact and speech normal     PSYCH: mentation appears normal. and affect normal/bright     LYMPHATICS: No cervical adenopathy.      DIAGNOSTICS:     EKG: Not indicated due to non-vascular surgery and low risk of event (age <65 and without cardiac risk factors)    Labs Resulted Today:   Results for orders placed or performed during the hospital encounter of 05/29/18   XR Lumbar Spine 2/3 Views    Narrative    LUMBAR SPINE TWO-THREE VIEWS   5/29/2018 3:45 PM     HISTORY: Chronic midline low back pain without sciatica.     COMPARISON: None.    FINDINGS:  There are five non-rib bearing lumbar type vertebrae. Mild  broad-based levoconvex curvature of the lumbar spine is centered at  L3. There is disc height loss at L3-L4. Otherwise, the vertebral  bodies, pedicles, disc spaces, perivertebral soft tissues, alignment,  and sacroiliac joints are normal in appearance.  No evidence for  fracture .       Impression    IMPRESSION:    1.  Degenerative disc disease at L3-L4.  2. Levoconvex curvature of the lumbar spine centered at L3-L4 is  likely degenerative in etiology.  4. No acute fracture or malalignment.    LILIANA RAYMOND MD     Labs Drawn and in Process:   Unresulted Labs Ordered in the Past 30 Days of this Admission     No orders found from 4/8/2018 to 6/8/2018.          Recent Labs   Lab Test  04/03/17   1943  04/03/17   0140  04/12/16   1307  10/14/15   1407   HGB  13.8   --   13.5  14.3   PLT  141*   --   141*  143*   NA  144   --    --   137   POTASSIUM  3.7   --    --   3.7   CR  0.86   --    --   0.68   A1C   --   5.3   --    --         IMPRESSION:   Reason for surgery/procedure: Meniscal tear  Diagnosis/reason for consult: pre-op physical to evaluate for anesthesia and its sis-operative risks.    The proposed surgical procedure is considered INTERMEDIATE risk.    REVISED CARDIAC RISK INDEX  The patient has the following serious cardiovascular risks for perioperative complications such as (MI, PE, VFib and 3  AV Block):  No serious cardiac risks  INTERPRETATION: 0 risks: Class I (very low risk - 0.4% complication rate)    The patient has the following additional risks for perioperative complications:  No identified additional risks      ICD-10-CM    1. Preop general physical exam Z01.818    2. Other tear of meniscus of left knee, unspecified meniscus, unspecified whether old or current tear, subsequent encounter S83.204D        RECOMMENDATIONS:       Cardiovascular Risk  No risk identified      Pulmonary Risk  No risk identified      Obstructive Sleep Apnea (or suspected sleep apnea)  Has a history of sleep apnea which had resolved after the septal deviation repair.      Anemia  No risk identified      --Patient is to take all scheduled medications on the day of surgery EXCEPT for modifications listed below.    Anticoagulant or Antiplatelet Medication Use  ASPIRIN: To be held until after the surgery.  NSAIDS: To be held until after the  surgery        APPROVAL GIVEN to proceed with proposed procedure, without further diagnostic evaluation    Benjamín is overall doing well.  He is clear for the procedure as scheduled.  No further work up is needed.  Instructed him to fast at least 8 hrs before the procedure time. Not take any blood thinner.   I recommend to stay away from ASA and NSAIDs until after the surgery. I went over his medication list with instructions -may take all them as prescribed but hold them off on am of the procedure. May resume all of his medications after the surgery.  A written instruction was given as well.  Recommend appropriate DVT prophylactic during and after the surgery per the surgeon's recommendation.  All of his questions were answered.         Signed Electronically by: Yariel Davis Mai, MD    Copy of this evaluation report is provided to requesting physician.    [place] Preop Guidelines    Revised Cardiac Risk Index

## 2018-06-08 NOTE — TELEPHONE ENCOUNTER
"Pt had arthroscopic knee surgery today at [facility]. Came home after surgery, took a nap. Woke up from sleep a few minutes ago and wife says pt \"is shaking and cannot stop\". Pt alert and fully oriented, talking coherently. Asked wife what pt's temp is and she said \"he doesn't have a fever\". No temp taken, does not feel warm at all according to wife. Wife declined taking temp at this time because \"if I put a thermometer in his mouth right now it would break\". Not having pain. Had a dose of oxycodone at hospital before discharge. Sent home w/ Percocet and plain oxycodone Rxs. Advised wife first try rewarming. Put a blanket in dryer then wrap pt in warm blanket. Give pt warm clear liquid to drink. Be sure AC in home is not too high. If rewarming does not resolve the shaking would give pain med per Rx if instructions allow a dose now. Take temp and call back if these measures do not help the shaking. Rec using unbreakable, digital thermometer. Wife /Pt voiced understanding and agreement.     Please note: infection screening questions not asked during this call because these were asked already today and answers documented. Dayanara Rojo RN/FNA      Additional Information    Negative: Sounds like a life-threatening emergency to the triager    Negative: Chest pain    Negative: Difficulty breathing    Negative: Surgical incision symptoms and questions    Negative: [1] Discomfort (pain, burning or stinging) when passing urine AND [2] male    Negative: [1] Discomfort (pain, burning or stinging) when passing urine AND [2] female    Negative: Constipation    Negative: Calf pain    Negative: Dizziness is severe, or persists > 24 hours after surgery    Negative: Pain, redness, swelling, or pus at IV Site    Negative: Symptoms arising from use of a urinary catheter (Rossi or Coude)    Negative: Cast problems or questions    Negative: Medication question    Negative: [1] Widespread rash AND [2] bright red, sunburn-like    " "Negative: [1] SEVERE headache AND [2] after spinal (epidural) anesthesia    Negative: [1] Vomiting AND [2] persists > 4 hours    Negative: [1] Vomiting AND [2] abdomen looks much more swollen than usual    Negative: [1] Drinking very little AND [2] dehydration suspected (e.g., no urine > 12 hours, very dry mouth, very lightheaded)    Negative: Patient sounds very sick or weak to the triager    Negative: Sounds like a serious complication to the triager    Negative: Fever > 100.5 F (38.1 C)     Temp not taken but \"does not feel hot\".    Negative: [1] SEVERE post-op pain (e.g., excruciating, pain scale 8-10) AND [2] not controlled with pain medications    Negative: Caller has URGENT question and triager unable to answer question    Negative: [1] Headache AND [2] after spinal (epidural) anesthesia AND [3] not severe    Negative: [1] Daily fever > 99.5 F (37.5 C) AND [2] persists > 3 days     Temp not taken but wife states pt does not have fever. See note.    Negative: [1] MILD-MODERATE post-op pain (e.g., pain scale 1-7) AND [2] not controlled with pain medications    Negative: Caller has NON-URGENT question and triager unable to answer question    Negative: General activity, questions about    Negative: Resuming driving, questions about    Negative: Resuming sexual relations, questions about    Negative: Getting the incision wet, questions about    Negative: [1] Vomiting AND [2] present < 4 hours    Other post-op symptom or question (all triage questions negative)    Protocols used: POST-OP SYMPTOMS AND QUESTIONS-ADULT-    "

## 2018-06-08 NOTE — DISCHARGE INSTRUCTIONS
General Knee Arthroscopy Discharge Instructions                                     [phone]  Bone and Joint Service Line for issues or concerns        General Care:  After surgery you may feel tired/sleepy. This is normal. Please have someone stay with you for 24 hours after surgery. You should avoid driving for 1-2 days after surgery, as your reaction time may be slow. You should not drive at all if you have had surgery on your arms, right leg and/or are taking narcotic pain medications until released by your doctor. If you have any question along the way please contact the office. If you feel it is an issue cannot wait for normal office hours, contact the on-call physician.  Elevate your leg with a couple of pillows placed under your ankle/calf area. Do this for the first couple days frequently.     Bandages:   Change your bandage after the first 72 hours. You may use Band-Aids or sterile gauze with a small amount of tape. It s normal to have some blood-tinged fluid on your bandages, this will usually continue for the first day or two. Keep the area clean and dry. Do not apply any ointments. Use the ACE wrap from your foot to thigh until you are seen at your follow up.     Bathing:  Do not submerge your incision in water such as a bath or pool. It is ok to shower after removing your initial bandage after the first 2 days from surgery. Avoid any excessively hot showers, baths, or hot tubes after surgery.     Follow up:  Your follow up appointment should already be scheduled. If its not, please call the office to verify an appointment 10 days after surgery.     Diet:  Start with non-alcoholic liquids at first, particularly water or sports drinks after surgery. Progress to bland foods such as crackers and bread and finally to your normal diet if you have no problems.     Pain control:  Take your pain medications as prescribed. These medications may make you sleepy. Do not drive, operate equipment, or drink  alcohol when taking these.  You may take Tylenol (Generic name is acetaminophen) or NSAIDs (Motrin, Ibuprofen, Aleve, Naproxen) as directed on the bottle for additional relief or in place of the prescribed pain medications as your pain gets better. If the medications cause a reaction such as nausea or skin rash, stop taking them and contact your doctor. Please plan accordingly, pain medications will not be re-filled on the weekends or at night. Call the office during the day if you need more medications.    Crutches:  Use crutches/walker/cane only if needed after surgery. You can stop using these when you feel stable on your feet.     Braces:  Some surgeries will require the use of a brace. Use this brace as directed.         Physical Therapy:  Depending on your surgery, physical therapy may start within a few days or be delayed 4-6 weeks. At your first post-operative visit, your doctor will direct you on your personal therapy program if needed.     Activity:  Unless otherwise instructed, you can weight bear as tolerated. While laying or sitting down you should straighten your knee all the way out and then gently work on bending the knee back. Do not worry at first if your knee feels stiff and will not bend like normal, this will get better.     Normal findings after surgery:  Numbness and tenderness around the incisions is normal.  You may have bruising around the incisions.  Your knee will be swollen for 3 weeks after surgery. It will feel  tight  to move.   Low grade fevers less than 100.5 degrees Fahrenheit are normal.       When to call the Office:  Temperature greater than 101.5 degrees Fahreheit.  Fever, chills, and increasing pain in the knee.  Excessive drainage from the incisions that include bright red blood.  Drainage from the incisions sites that appear yellow, pus-like, or foul smelling.  Increasing pain the knee not relieved by the prescribed pain medications or ice.  Pain or swelling in your calf  area (in back above your ankle)  Any other effects you feel are significant.    Same-Day Surgery   Adult Discharge Orders & Instructions     For 24 hours after surgery    1. Get plenty of rest.  A responsible adult must stay with you for at least 24 hours after you leave the hospital.   2. Do not drive or use heavy equipment.  If you have weakness or tingling, don't drive or use heavy equipment until this feeling goes away.  3. Do not drink alcohol.  4. Avoid strenuous or risky activities.  Ask for help when climbing stairs.   5. You may feel lightheaded.  IF so, sit for a few minutes before standing.  Have someone help you get up.   6. You may have a slight fever. Call the doctor if your fever is over 100 F (37.7 C) (taken under the tongue) or lasts longer than 24 hours.  7. You may have a dry mouth, a sore throat, muscle aches or trouble sleeping.  These should go away after 24 hours.  8. Do not make important or legal decisions.  Based on the surgery/procedure that you had today, we do not anticipate that you will have any problems.  However, given the various responses that patients can have to the surgical experience, we want to ensure that you have information available to manage pain or nausea and what to do if you observe bleeding or you develop any signs and symptoms of infection:  Methods to control pain include:  Prescription pain medication or over the counter medications as prescribed or suggested by your physician.  In addition, ice packs and periods of rest are often helpful.  If your pain is not managed with the above methods, contact your physician.  Methods to control nausea include:  Anti-nausea medication approved by your physician.  Drink clear liquids such as apple juice, ginger ale, broth or 7-Up. Be sure to drink enough fluids.  Move to a regular diet as you feel able.  Rest may also help.  Bleeding:  It's not uncommon to see a little blood staining on the dressing, about the size of a  quarter in the first 24 hours; if you see this, there is no reason to be alarmed.  However, should this continue to increase in size, apply pressure if able, ant notify your physician.  Infection:  We do not anticipate that you will acquire an infection, but if you should experience any of the following symptoms:  redness, swelling, heat, increasing pain or abnormal drainage at your surgery site, fever or chills, please notify your physician.    Call your doctor for any of the followin.  Signs of infection (fever, growing tenderness at the surgery site, a large amount of drainage or bleeding, severe pain, foul-smelling drainage, redness, swelling).    2. It has been over 8 to 10 hours since surgery and you are still not able to urinate (pass water).    3.  Headache for over 24 hours.        Nurse advice line: [phone]

## 2018-06-08 NOTE — IP AVS SNAPSHOT
MRN:[id number]                      After Visit Summary   6/8/2018    Benjamín Mendoza    MRN: [id number]           Thank you!     Thank you for choosing [facility] for your care. Our goal is always to provide you with excellent care. Hearing back from our patients is one way we can continue to improve our services. Please take a few minutes to complete the written survey that you may receive in the mail after you visit with us. Thank you!        Patient Information     Date Of Birth          1960        About your hospital stay     You were admitted on:  June 8, 2018 You last received care in the:  [facility]    You were discharged on:  June 8, 2018       Who to Call     For medical emergencies, please call 911.  For non-urgent questions about your medical care, please call your primary care provider or clinic, [phone]  For questions related to your surgery, please call your surgery clinic        Attending Provider     Provider Dane Padron,  Orthopedics       Primary Care Provider Office Phone # Fax #    Aleida Larson PA-C [phone]      After Care Instructions      Diet as Tolerated       Return to diet before surgery, unless instructed otherwise.            Discharge Instructions       Review outpatient procedure discharge instructions with patient as directed by Provider            Discharge Instructions - Lifting Limit (specify)       Lifting limit  5 pounds until seen at Post-op follow up appointment.            Dressing Change       Change dressing on third day after surgery.            Ice to affected area       Ice pack to surgical site every 15 minutes per hour for 24 hours            No Alcohol       For 24 hours post procedure or if taking pain medications            No driving or operating machinery        until the day after procedure or when taking pain medications            Notify Provider       For signs and  symptoms of infection: Fever greater than 101, redness, swelling, heat at site, drainage, pus.            Return to clinic       Return to clinic in 10 days            Weight bearing - As tolerated           Wound care       Do not immerse wound in water until sutures removed                  Your next 10 appointments already scheduled     Jun 20, 2018  3:00 PM CDT   [facility]anco DO   [facility]    [address]  [address]  [place] [facility] [address] [phone]              Further instructions from your care team       General Knee Arthroscopy Discharge Instructions                                     [phone]  Bone and Joint Service Line for issues or concerns        General Care:  After surgery you may feel tired/sleepy. This is normal. Please have someone stay with you for 24 hours after surgery. You should avoid driving for 1-2 days after surgery, as your reaction time may be slow. You should not drive at all if you have had surgery on your arms, right leg and/or are taking narcotic pain medications until released by your doctor. If you have any question along the way please contact the office. If you feel it is an issue cannot wait for normal office hours, contact the on-call physician.  Elevate your leg with a couple of pillows placed under your ankle/calf area. Do this for the first couple days frequently.     Bandages:   Change your bandage after the first 72 hours. You may use Band-Aids or sterile gauze with a small amount of tape. It s normal to have some blood-tinged fluid on your bandages, this will usually continue for the first day or two. Keep the area clean and dry. Do not apply any ointments. Use the ACE wrap from your foot to thigh until you are seen at your follow up.     Bathing:  Do not submerge your incision in water such as a bath or pool. It is ok to shower after removing your initial bandage after the first  2 days from surgery. Avoid any excessively hot showers, baths, or hot tubes after surgery.     Follow up:  Your follow up appointment should already be scheduled. If its not, please call the office to verify an appointment 10 days after surgery.     Diet:  Start with non-alcoholic liquids at first, particularly water or sports drinks after surgery. Progress to bland foods such as crackers and bread and finally to your normal diet if you have no problems.     Pain control:  Take your pain medications as prescribed. These medications may make you sleepy. Do not drive, operate equipment, or drink alcohol when taking these.  You may take Tylenol (Generic name is acetaminophen) or NSAIDs (Motrin, Ibuprofen, Aleve, Naproxen) as directed on the bottle for additional relief or in place of the prescribed pain medications as your pain gets better. If the medications cause a reaction such as nausea or skin rash, stop taking them and contact your doctor. Please plan accordingly, pain medications will not be re-filled on the weekends or at night. Call the office during the day if you need more medications.    Crutches:  Use crutches/walker/cane only if needed after surgery. You can stop using these when you feel stable on your feet.     Braces:  Some surgeries will require the use of a brace. Use this brace as directed.         Physical Therapy:  Depending on your surgery, physical therapy may start within a few days or be delayed 4-6 weeks. At your first post-operative visit, your doctor will direct you on your personal therapy program if needed.     Activity:  Unless otherwise instructed, you can weight bear as tolerated. While laying or sitting down you should straighten your knee all the way out and then gently work on bending the knee back. Do not worry at first if your knee feels stiff and will not bend like normal, this will get better.     Normal findings after surgery:  Numbness and tenderness around the incisions is  normal.  You may have bruising around the incisions.  Your knee will be swollen for 3 weeks after surgery. It will feel  tight  to move.   Low grade fevers less than 100.5 degrees Fahrenheit are normal.       When to call the Office:  Temperature greater than 101.5 degrees Fahreheit.  Fever, chills, and increasing pain in the knee.  Excessive drainage from the incisions that include bright red blood.  Drainage from the incisions sites that appear yellow, pus-like, or foul smelling.  Increasing pain the knee not relieved by the prescribed pain medications or ice.  Pain or swelling in your calf area (in back above your ankle)  Any other effects you feel are significant.    Same-Day Surgery   Adult Discharge Orders & Instructions     For 24 hours after surgery    1. Get plenty of rest.  A responsible adult must stay with you for at least 24 hours after you leave the hospital.   2. Do not drive or use heavy equipment.  If you have weakness or tingling, don't drive or use heavy equipment until this feeling goes away.  3. Do not drink alcohol.  4. Avoid strenuous or risky activities.  Ask for help when climbing stairs.   5. You may feel lightheaded.  IF so, sit for a few minutes before standing.  Have someone help you get up.   6. You may have a slight fever. Call the doctor if your fever is over 100 F (37.7 C) (taken under the tongue) or lasts longer than 24 hours.  7. You may have a dry mouth, a sore throat, muscle aches or trouble sleeping.  These should go away after 24 hours.  8. Do not make important or legal decisions.  Based on the surgery/procedure that you had today, we do not anticipate that you will have any problems.  However, given the various responses that patients can have to the surgical experience, we want to ensure that you have information available to manage pain or nausea and what to do if you observe bleeding or you develop any signs and symptoms of infection:  Methods to control pain include:   "Prescription pain medication or over the counter medications as prescribed or suggested by your physician.  In addition, ice packs and periods of rest are often helpful.  If your pain is not managed with the above methods, contact your physician.  Methods to control nausea include:  Anti-nausea medication approved by your physician.  Drink clear liquids such as apple juice, ginger ale, broth or 7-Up. Be sure to drink enough fluids.  Move to a regular diet as you feel able.  Rest may also help.  Bleeding:  It's not uncommon to see a little blood staining on the dressing, about the size of a quarter in the first 24 hours; if you see this, there is no reason to be alarmed.  However, should this continue to increase in size, apply pressure if able, ant notify your physician.  Infection:  We do not anticipate that you will acquire an infection, but if you should experience any of the following symptoms:  redness, swelling, heat, increasing pain or abnormal drainage at your surgery site, fever or chills, please notify your physician.    Call your doctor for any of the followin.  Signs of infection (fever, growing tenderness at the surgery site, a large amount of drainage or bleeding, severe pain, foul-smelling drainage, redness, swelling).    2. It has been over 8 to 10 hours since surgery and you are still not able to urinate (pass water).    3.  Headache for over 24 hours.        Nurse advice line: [phone]    Pending Results     No orders found from 2018 to 2018.            Admission Information     Date & Time Provider Department Dept. Phone    2018 Dane Blanco DO [facility] Phase [phone]      Your Vitals Were     Blood Pressure Temperature Respirations Height Weight Pulse Oximetry    135/92 97.8  F (36.6  C) (Oral) 16 1.778 m (5' 10\") 85.5 kg (188 lb 6.4 oz) 98%    BMI (Body Mass Index)                   27.03 kg/m2           Care EveryWhere ID     This is your " Care EveryWhere ID. This could be used by other organizations to access your [facility] medical records  [id number]        Equal Access to Services     SYDNEE MARION : Hadii brandyn Mathews, wajazzda marie, eveliaausten riverabarbararuddy rinconnixonruddy, lincoln vazquez hayjennifer heardena kevinpatazeb jensenAlida العراقي [facility] [phone].    ATENCIÓN: Si habla español, tiene a cortes disposición servicios gratuitos de asistencia lingüística. Llame al [phone].    We comply with applicable federal civil rights laws and [place] laws. We do not discriminate on the basis of race, color, national origin, age, disability, sex, sexual orientation, or gender identity.               Review of your medicines      START taking        Dose / Directions    methocarbamol 750 MG tablet   Commonly known as:  ROBAXIN   Used for:  Other tear of medial meniscus of left knee as current injury, initial encounter        Dose:  750 mg   Take 1 tablet (750 mg) by mouth every 6 hours as needed for muscle spasms (muscle spasm)   Quantity:  30 tablet   Refills:  0       oxyCODONE IR 5 MG tablet   Commonly known as:  ROXICODONE   Used for:  Other tear of medial meniscus of left knee as current injury, initial encounter        Dose:  5-10 mg   Take 1-2 tablets (5-10 mg) by mouth every 4 hours as needed for pain or other (Moderate to Severe)   Quantity:  30 tablet   Refills:  0         CONTINUE these medicines which have NOT CHANGED        Dose / Directions    aspirin 81 MG EC tablet   Used for:  Hyperlipidemia, unspecified hyperlipidemia type        Dose:  81 mg   Take 1 tablet (81 mg) by mouth daily   Quantity:  60 tablet   Refills:  0       atorvastatin 40 MG tablet   Commonly known as:  LIPITOR   Used for:  Hyperlipidemia, unspecified hyperlipidemia type        TAKE 1 TABLET DAILY   Quantity:  90 tablet   Refills:  3       clonazePAM 2 MG tablet   Commonly known as:  klonoPIN   Used for:  Sleep disorder        Dose:  2 mg   Take 1 tablet (2 mg) by mouth At Bedtime Reported  on 2/27/2017   Quantity:  90 tablet   Refills:  0       HORIZANT 600 MG tablet   Generic drug:  gabapentin enacarbil        Dose:  600 mg   Take 600 mg by mouth At Bedtime   Refills:  0       metaxalone 800 MG tablet   Commonly known as:  SKELAXIN   Used for:  Chronic midline low back pain without sciatica        TAKE 1 TABLET THREE TIMES A DAY   Quantity:  90 tablet   Refills:  2       omeprazole 20 MG CR capsule   Commonly known as:  priLOSEC   Used for:  Gastroesophageal reflux disease without esophagitis        Dose:  20 mg   Take 1 capsule (20 mg) by mouth daily   Quantity:  90 capsule   Refills:  3       ONE-A-DAY MENS PO        Dose:  1 tablet   Take 1 tablet by mouth every evening   Refills:  0       oxyCODONE-acetaminophen 5-325 MG per tablet   Commonly known as:  PERCOCET   Used for:  Chronic midline low back pain without sciatica        Dose:  1-2 tablet   Take 1-2 tablets by mouth every 4 hours as needed for pain (moderate to severe)   Quantity:  90 tablet   Refills:  0            Where to get your medicines      Some of these will need a paper prescription and others can be bought over the counter. Ask your nurse if you have questions.     Bring a paper prescription for each of these medications     methocarbamol 750 MG tablet    oxyCODONE IR 5 MG tablet       You don't need a prescription for these medications     oxyCODONE-acetaminophen 5-325 MG per tablet                Protect others around you: Learn how to safely use, store and throw away your medicines at www.disposemymeds.org.        Information about OPIOIDS     PRESCRIPTION OPIOIDS: WHAT YOU NEED TO KNOW   You have a prescription for an opioid (narcotic) pain medicine. Opioids can cause addiction. If you have a history of chemical dependency of any type, you are at a higher risk of becoming addicted to opioids. Only take this medicine after all other options have been tried. Take it for as short a time and as few doses as possible.     Do  not:    Drive. If you drive while taking these medicines, you could be arrested for driving under the influence (DUI).    Operate heavy machinery    Do any other dangerous activities while taking these medicines.     Drink any alcohol while taking these medicines.      Take with any other medicines that contain acetaminophen. Read all labels carefully. Look for the word  acetaminophen  or  Tylenol.  Ask your pharmacist if you have questions or are unsure.    Store your pills in a secure place, locked if possible. We will not replace any lost or stolen medicine. If you don t finish your medicine, please throw away (dispose) as directed by your pharmacist. The [place] Pollution Control Agency has more information about safe disposal: https://www.pca.[facility].[place].us/living-green/managing-unwanted-medications    All opioids tend to cause constipation. Drink plenty of water and eat foods that have a lot of fiber, such as fruits, vegetables, prune juice, apple juice and high-fiber cereal. Take a laxative (Miralax, milk of magnesia, Colace, Senna) if you don t move your bowels at least every other day.              Medication List: This is a list of all your medications and when to take them. Check marks below indicate your daily home schedule. Keep this list as a reference.      Medications           Morning Afternoon Evening Bedtime As Needed    aspirin 81 MG EC tablet   Take 1 tablet (81 mg) by mouth daily                                atorvastatin 40 MG tablet   Commonly known as:  LIPITOR   TAKE 1 TABLET DAILY                                clonazePAM 2 MG tablet   Commonly known as:  klonoPIN   Take 1 tablet (2 mg) by mouth At Bedtime Reported on 2/27/2017                                HORIZANT 600 MG tablet   Take 600 mg by mouth At Bedtime   Generic drug:  gabapentin enacarbil                                metaxalone 800 MG tablet   Commonly known as:  SKELAXIN   TAKE 1 TABLET THREE TIMES A DAY                                 methocarbamol 750 MG tablet   Commonly known as:  ROBAXIN   Take 1 tablet (750 mg) by mouth every 6 hours as needed for muscle spasms (muscle spasm)                                omeprazole 20 MG CR capsule   Commonly known as:  priLOSEC   Take 1 capsule (20 mg) by mouth daily                                ONE-A-DAY MENS PO   Take 1 tablet by mouth every evening                                oxyCODONE IR 5 MG tablet   Commonly known as:  ROXICODONE   Take 1-2 tablets (5-10 mg) by mouth every 4 hours as needed for pain or other (Moderate to Severe)                                oxyCODONE-acetaminophen 5-325 MG per tablet   Commonly known as:  PERCOCET   Take 1-2 tablets by mouth every 4 hours as needed for pain (moderate to severe)

## 2018-06-20 ENCOUNTER — OFFICE VISIT (OUTPATIENT)
Dept: ORTHOPEDICS | Facility: OTHER | Age: 58
End: 2018-06-20
Payer: COMMERCIAL

## 2018-06-20 VITALS
TEMPERATURE: 97.7 F | SYSTOLIC BLOOD PRESSURE: 122 MMHG | BODY MASS INDEX: 26.4 KG/M2 | WEIGHT: 184 LBS | DIASTOLIC BLOOD PRESSURE: 60 MMHG

## 2018-06-20 DIAGNOSIS — S83.242A OTHER TEAR OF MEDIAL MENISCUS OF LEFT KNEE AS CURRENT INJURY, INITIAL ENCOUNTER: Primary | ICD-10-CM

## 2018-06-20 PROCEDURE — 99024 POSTOP FOLLOW-UP VISIT: CPT | Performed by: ORTHOPAEDIC SURGERY

## 2018-06-20 ASSESSMENT — PAIN SCALES - GENERAL: PAINLEVEL: MILD PAIN (2)

## 2018-06-20 NOTE — LETTER
6/20/2018         RE: Benjamín Mendoza  [address] [facility] [address]        Dear Colleague,    Thank you for referring your patient, Benjamín Mendoza, to the [facility]. Please see a copy of my visit note below.    Orthopedic Clinic Post-Operative Note    CHIEF COMPLAINT:   Chief Complaint   Patient presents with     Surgical Followup     DOS:6/8/2018~Left knee arthroscopy with arthroscopic partial medial meniscectomy~12 days postop       HISTORY OF PRESENT ILLNESS  Very minimal pain.  No other issues currently.    Patient's past medical, surgical, social and family histories reviewed.     Past Medical History:   Diagnosis Date     Chronic back pain     percocet, spinal fluid leak dx but never visible     Chronic headaches      Esophageal reflux      GERD (gastroesophageal reflux disease)      Motion sickness      CARY (obstructive sleep apnea) 3/2009    AHI 26 Ndir 84%     RLS (restless legs syndrome)        Past Surgical History:   Procedure Laterality Date     ARTHROSCOPY KNEE WITH MEDIAL MENISCECTOMY Left 6/8/2018    Procedure: ARTHROSCOPY KNEE WITH MEDIAL MENISCECTOMY;  Left knee arthroscopy with arthroscopic partial meniscectomy;  Surgeon: Dane Blanco DO;  Location: [facility] OR     COLONOSCOPY  06/12/07     COLONOSCOPY N/A 7/12/2017    Procedure: COLONOSCOPY;  colonoscopy;  Surgeon: Lake Mueller MD;  Location:  GI     HC TRABECULOPLASTY BY LASER SURGERY  2000    LASIK     SEPTOPLASTY, TURBINOPLASTY, COMBINED N/A 4/26/2016    Procedure: COMBINED SEPTOPLASTY, TURBINOPLASTY;  Surgeon: Ken Molina MD;  Location:  OR     [facility]         Medications:    Current Outpatient Prescriptions on File Prior to Visit:  aspirin EC 81 MG EC tablet Take 1 tablet (81 mg) by mouth daily (Patient not taking: Reported on 6/7/2018)   atorvastatin (LIPITOR) 40 MG tablet TAKE 1 TABLET DAILY   clonazePAM (KLONOPIN) 2 MG tablet Take 1 tablet (2 mg) by mouth At Bedtime Reported  on 2017   HORIZANT 600 MG tablet Take 600 mg by mouth At Bedtime   metaxalone (SKELAXIN) 800 MG tablet TAKE 1 TABLET THREE TIMES A DAY   methocarbamol (ROBAXIN) 750 MG tablet Take 1 tablet (750 mg) by mouth every 6 hours as needed for muscle spasms (muscle spasm) (Patient not taking: Reported on 2018)   Multiple Vitamin (ONE-A-DAY MENS PO) Take 1 tablet by mouth every evening    omeprazole (PRILOSEC) 20 MG CR capsule Take 1 capsule (20 mg) by mouth daily   oxyCODONE IR (ROXICODONE) 5 MG tablet Take 1-2 tablets (5-10 mg) by mouth every 4 hours as needed for pain or other (Moderate to Severe)   oxyCODONE-acetaminophen (PERCOCET) 5-325 MG per tablet Take 1-2 tablets by mouth every 4 hours as needed for pain (moderate to severe) (Patient not taking: Reported on 2018)     No current facility-administered medications on file prior to visit.     Allergies   Allergen Reactions     Sumatriptan Nausea and Vomiting     Migraine medications       Social History     Occupational History     Elevator service      [employer]     Social History Main Topics     Smoking status: Never Smoker     Smokeless tobacco: Never Used      Comment: no smokers in household     Alcohol use No     Drug use: No     Sexual activity: Yes     Partners: Female     Birth control/ protection: Surgical, Female Surgical      Comment: Wife had a tubal, 5 children       Family History   Problem Relation Age of Onset     Cerebrovascular Disease Mother 66     aneursym -  at 66     Other - See Comments Sister      Half sister, heart problems unsure what kind     Other - See Comments Brother      Was in a fire     No Known Problems Maternal Grandmother      No Known Problems Maternal Grandfather      Arthritis Maternal Aunt      Arthritis Maternal Uncle      Muscular Disorder Daughter 18     Small fiber neuropathy     Unknown/Adopted Father      Unknown/Adopted Paternal Grandmother      Unknown/Adopted Paternal Grandfather       Hypertension No family hx of      Diabetes No family hx of        REVIEW OF SYSTEMS  General: negative for, night sweats, dizziness, fatigue  Resp: No shortness of breath and no cough  CV: negative for chest pain, syncope or near-syncope  GI: negative for nausea, vomiting and diarrhea  : negative for dysuria and hematuria  Musculoskeletal: as above  Neurologic: negative for syncope   Hematologic: negative for bleeding disorder    Physical Exam:  Vitals: /60 (BP Location: Right arm, Patient Position: Sitting, Cuff Size: Adult Regular)  Temp 97.7  F (36.5  C) (Temporal)  Wt 184 lb (83.5 kg)  BMI 26.4 kg/m2  BMI= Body mass index is 26.4 kg/(m^2).  Constitutional: healthy, alert and no acute distress   Psychiatric: mentation appears normal and affect normal/bright  NEURO: no focal deficits  SKIN: .healing well, well approximated skin edges, without signs of infection including no erythema, incision breakdown or purlent drainage  JOINT/EXTREMITIES: Motion.  No calf tenderness or swelling.  No knee effusion.  Appropriate portal site tenderness  GAIT: not tested     Diagnostic Modalities:  None today.  Independent visualization of the images was performed.      Impression:   Chief Complaint   Patient presents with     Surgical Followup     DOS:6/8/2018~Left knee arthroscopy with arthroscopic partial medial meniscectomy~12 days postop   Doing as expected.  Some underlying chondromalacia.  Arthroscopy pictures discussed    Plan:   Activity: Gradual return back to full activity  Staples/Sutures out: Yes.  Pain controlled: Yes. Continue to use: Nothing  Immobilzation: No  Physical Therapy: none at this time.   Rest, Ice  Return to clinic PRN, or sooner as needed for changes.    Re-x-ray on return: No    Richard Blanco D.O.    Again, thank you for allowing me to participate in the care of your patient.        Sincerely,        Dane Blanco, DO

## 2018-06-20 NOTE — NURSING NOTE
"Benjamín Mendoza is a 58 year old male who presents for:  Chief Complaint   Patient presents with     Surgical Followup     DOS:6/8/2018~Left knee arthroscopy with arthroscopic partial medial meniscectomy~12 days postop        Initial Vitals:  /60 (BP Location: Right arm, Patient Position: Sitting, Cuff Size: Adult Regular)  Temp 97.7  F (36.5  C) (Temporal)  Wt 83.5 kg (184 lb)  BMI 26.4 kg/m2 Estimated body mass index is 26.4 kg/(m^2) as calculated from the following:    Height as of 6/8/18: 1.778 m (5' 10\").    Weight as of this encounter: 83.5 kg (184 lb).. Body surface area is 2.03 meters squared. BP completed using cuff size: large  Mild Pain (2)    Do you feel safe in your environment?  Yes  Do you need any refills today? No    Nursing Comments:         Leticia Warren  "

## 2018-06-20 NOTE — MR AVS SNAPSHOT
After Visit Summary   6/20/2018    Benjamín Mendoza    MRN: [id number]           Patient Information     Date Of Birth          1960        Visit Information        Provider Department      6/20/2018 3:00 PM Dane Blanco DO [facility] [place]        Today's Diagnoses     Other tear of medial meniscus of left knee as current injury, initial encounter    -  1       Follow-ups after your visit        Who to contact     If you have questions or need follow up information about today's clinic visit or your schedule please contact [facility] directly at [phone].  Normal or non-critical lab and imaging results will be communicated to you by MyChart, letter or phone within 4 business days after the clinic has received the results. If you do not hear from us within 7 days, please contact the clinic through MyChart or phone. If you have a critical or abnormal lab result, we will notify you by phone as soon as possible.  Submit refill requests through [employer] or call your pharmacy and they will forward the refill request to us. Please allow 3 business days for your refill to be completed.          Additional Information About Your Visit        Care EveryWhere ID     This is your Care EveryWhere ID. This could be used by other organizations to access your [facility] medical records  [id number]        Your Vitals Were     Temperature BMI (Body Mass Index)                97.7  F (36.5  C) (Temporal) 26.4 kg/m2           Blood Pressure from Last 3 Encounters:   06/20/18 122/60   06/08/18 122/84   06/07/18 122/70    Weight from Last 3 Encounters:   06/20/18 83.5 kg (184 lb)   06/08/18 85.5 kg (188 lb 6.4 oz)   06/07/18 85.5 kg (188 lb 6.4 oz)              Today, you had the following     No orders found for display       Primary Care Provider Office Phone # Fax #    Aleida Larson PA-C [phone] [address] [place] [address]        Equal Access to  Services     [facility]: Hadii aad ku hadlinrajni Mathews, waaxda luqadaha, qaybta kaalmada sujey, lincoln cordero . So [facility] [phone].    ATENCIÓN: Si habla fernando, tiene a cortes disposición servicios gratuitos de asistencia lingüística. Llame al [phone].    We comply with applicable federal civil rights laws and [place] laws. We do not discriminate on the basis of race, color, national origin, age, disability, sex, sexual orientation, or gender identity.            Thank you!     Thank you for choosing [facility]  for your care. Our goal is always to provide you with excellent care. Hearing back from our patients is one way we can continue to improve our services. Please take a few minutes to complete the written survey that you may receive in the mail after your visit with us. Thank you!             Your Updated Medication List - Protect others around you: Learn how to safely use, store and throw away your medicines at www.disposemymeds.org.          This list is accurate as of 6/20/18  3:24 PM.  Always use your most recent med list.                   Brand Name Dispense Instructions for use Diagnosis    aspirin 81 MG EC tablet     60 tablet    Take 1 tablet (81 mg) by mouth daily    Hyperlipidemia, unspecified hyperlipidemia type       atorvastatin 40 MG tablet    LIPITOR    90 tablet    TAKE 1 TABLET DAILY    Hyperlipidemia, unspecified hyperlipidemia type       clonazePAM 2 MG tablet    klonoPIN    90 tablet    Take 1 tablet (2 mg) by mouth At Bedtime Reported on 2/27/2017    Sleep disorder       HORIZANT 600 MG tablet   Generic drug:  gabapentin enacarbil      Take 600 mg by mouth At Bedtime        metaxalone 800 MG tablet    SKELAXIN    90 tablet    TAKE 1 TABLET THREE TIMES A DAY    Chronic midline low back pain without sciatica       methocarbamol 750 MG tablet    ROBAXIN    30 tablet    Take 1 tablet (750 mg) by mouth every 6 hours as needed for muscle  spasms (muscle spasm)    Other tear of medial meniscus of left knee as current injury, initial encounter       omeprazole 20 MG CR capsule    priLOSEC    90 capsule    Take 1 capsule (20 mg) by mouth daily    Gastroesophageal reflux disease without esophagitis       ONE-A-DAY MENS PO      Take 1 tablet by mouth every evening        oxyCODONE IR 5 MG tablet    ROXICODONE    30 tablet    Take 1-2 tablets (5-10 mg) by mouth every 4 hours as needed for pain or other (Moderate to Severe)    Other tear of medial meniscus of left knee as current injury, initial encounter       oxyCODONE-acetaminophen 5-325 MG per tablet    PERCOCET    90 tablet    Take 1-2 tablets by mouth every 4 hours as needed for pain (moderate to severe)    Chronic midline low back pain without sciatica

## 2018-06-20 NOTE — PROGRESS NOTES
Orthopedic Clinic Post-Operative Note    CHIEF COMPLAINT:   Chief Complaint   Patient presents with     Surgical Followup     DOS:6/8/2018~Left knee arthroscopy with arthroscopic partial medial meniscectomy~12 days postop       HISTORY OF PRESENT ILLNESS  Very minimal pain.  No other issues currently.    Patient's past medical, surgical, social and family histories reviewed.     Past Medical History:   Diagnosis Date     Chronic back pain     percocet, spinal fluid leak dx but never visible     Chronic headaches      Esophageal reflux      GERD (gastroesophageal reflux disease)      Motion sickness      CARY (obstructive sleep apnea) 3/2009    AHI 26 Ndir 84%     RLS (restless legs syndrome)        Past Surgical History:   Procedure Laterality Date     ARTHROSCOPY KNEE WITH MEDIAL MENISCECTOMY Left 6/8/2018    Procedure: ARTHROSCOPY KNEE WITH MEDIAL MENISCECTOMY;  Left knee arthroscopy with arthroscopic partial meniscectomy;  Surgeon: Dane Blanco DO;  Location: [facility]     COLONOSCOPY  06/12/07     COLONOSCOPY N/A 7/12/2017    Procedure: COLONOSCOPY;  colonoscopy;  Surgeon: Lake Mueller MD;  Location: [facility] GI     HC TRABECULOPLASTY BY LASER SURGERY  2000    LASIK     SEPTOPLASTY, TURBINOPLASTY, COMBINED N/A 4/26/2016    Procedure: COMBINED SEPTOPLASTY, TURBINOPLASTY;  Surgeon: Ken Molina MD;  Location: [facility] OR     WISDOM [facility] GUIDEWIRE         Medications:    Current Outpatient Prescriptions on File Prior to Visit:  aspirin EC 81 MG EC tablet Take 1 tablet (81 mg) by mouth daily (Patient not taking: Reported on 6/7/2018)   atorvastatin (LIPITOR) 40 MG tablet TAKE 1 TABLET DAILY   clonazePAM (KLONOPIN) 2 MG tablet Take 1 tablet (2 mg) by mouth At Bedtime Reported on 2/27/2017   HORIZANT 600 MG tablet Take 600 mg by mouth At Bedtime   metaxalone (SKELAXIN) 800 MG tablet TAKE 1 TABLET THREE TIMES A DAY   methocarbamol (ROBAXIN) 750 MG tablet Take 1 tablet (750 mg) by mouth every 6 hours as needed for  muscle spasms (muscle spasm) (Patient not taking: Reported on 2018)   Multiple Vitamin (ONE-A-DAY MENS PO) Take 1 tablet by mouth every evening    omeprazole (PRILOSEC) 20 MG CR capsule Take 1 capsule (20 mg) by mouth daily   oxyCODONE IR (ROXICODONE) 5 MG tablet Take 1-2 tablets (5-10 mg) by mouth every 4 hours as needed for pain or other (Moderate to Severe)   oxyCODONE-acetaminophen (PERCOCET) 5-325 MG per tablet Take 1-2 tablets by mouth every 4 hours as needed for pain (moderate to severe) (Patient not taking: Reported on 2018)     No current facility-administered medications on file prior to visit.     Allergies   Allergen Reactions     Sumatriptan Nausea and Vomiting     Migraine medications       Social History     Occupational History     Elevator service      [employer]     Social History Main Topics     Smoking status: Never Smoker     Smokeless tobacco: Never Used      Comment: no smokers in household     Alcohol use No     Drug use: No     Sexual activity: Yes     Partners: Female     Birth control/ protection: Surgical, Female Surgical      Comment: Wife had a tubal, 5 children       Family History   Problem Relation Age of Onset     Cerebrovascular Disease Mother 66     aneursym -  at 66     Other - See Comments Sister      Half sister, heart problems unsure what kind     Other - See Comments Brother      Was in a fire     No Known Problems Maternal Grandmother      No Known Problems Maternal Grandfather      Arthritis Maternal Aunt      Arthritis Maternal Uncle      Muscular Disorder Daughter 18     Small fiber neuropathy     Unknown/Adopted Father      Unknown/Adopted Paternal Grandmother      Unknown/Adopted Paternal Grandfather      Hypertension No family hx of      Diabetes No family hx of        REVIEW OF SYSTEMS  General: negative for, night sweats, dizziness, fatigue  Resp: No shortness of breath and no cough  CV: negative for chest pain, syncope or near-syncope  GI:  negative for nausea, vomiting and diarrhea  : negative for dysuria and hematuria  Musculoskeletal: as above  Neurologic: negative for syncope   Hematologic: negative for bleeding disorder    Physical Exam:  Vitals: /60 (BP Location: Right arm, Patient Position: Sitting, Cuff Size: Adult Regular)  Temp 97.7  F (36.5  C) (Temporal)  Wt 184 lb (83.5 kg)  BMI 26.4 kg/m2  BMI= Body mass index is 26.4 kg/(m^2).  Constitutional: healthy, alert and no acute distress   Psychiatric: mentation appears normal and affect normal/bright  NEURO: no focal deficits  SKIN: .healing well, well approximated skin edges, without signs of infection including no erythema, incision breakdown or purlent drainage  JOINT/EXTREMITIES: Motion.  No calf tenderness or swelling.  No knee effusion.  Appropriate portal site tenderness  GAIT: not tested     Diagnostic Modalities:  None today.  Independent visualization of the images was performed.      Impression:   Chief Complaint   Patient presents with     Surgical Followup     DOS:6/8/2018~Left knee arthroscopy with arthroscopic partial medial meniscectomy~12 days postop   Doing as expected.  Some underlying chondromalacia.  Arthroscopy pictures discussed    Plan:   Activity: Gradual return back to full activity  Staples/Sutures out: Yes.  Pain controlled: Yes. Continue to use: Nothing  Immobilzation: No  Physical Therapy: none at this time.   Rest, Ice  Return to clinic PRN, or sooner as needed for changes.    Re-x-ray on return: No    Richard Blanco D.O.

## 2018-07-03 ENCOUNTER — TRANSFERRED RECORDS (OUTPATIENT)
Dept: HEALTH INFORMATION MANAGEMENT | Facility: CLINIC | Age: 58
End: 2018-07-03

## 2018-09-07 ENCOUNTER — OFFICE VISIT (OUTPATIENT)
Dept: FAMILY MEDICINE | Facility: OTHER | Age: 58
End: 2018-09-07
Payer: COMMERCIAL

## 2018-09-07 VITALS
SYSTOLIC BLOOD PRESSURE: 132 MMHG | BODY MASS INDEX: 27.41 KG/M2 | HEART RATE: 82 BPM | DIASTOLIC BLOOD PRESSURE: 70 MMHG | OXYGEN SATURATION: 99 % | WEIGHT: 191 LBS

## 2018-09-07 DIAGNOSIS — J06.9 VIRAL URI WITH COUGH: Primary | ICD-10-CM

## 2018-09-07 PROCEDURE — 99213 OFFICE O/P EST LOW 20 MIN: CPT | Performed by: PHYSICIAN ASSISTANT

## 2018-09-07 NOTE — PATIENT INSTRUCTIONS
"Nasal Saline: At the pharmacy you can find a \"Nettipot\" or NeilMed Nasal Rinse.  This is a salt solution that you mix with warm water.  I want you to perform nasal saline washes at least twice daily while you are sick.  You can do this anytime you feel like you are developing nasal congestion.  To properly utilize be sure you are leaning forward as far as you can and either tilt or squeeze slowly.  In the beginning this can be difficult to get to work properly but as the congestion is improved it will work easier.  If you don't use these properly you can feel as if you're drowning.     Nasal Steroid: Fluticasone/Flonase or Nasacort, These are OTC medications for allergies.  They are steroid sprays that are localized to the nose so no systemic effects. Two sprays each nostril once daily - allergist noted it is most effective if used before bed.  Ok to continue to use nasal saline as well.  When you spray it in the nose it should be up and out towards the eye on the side you are spraying the medication.  You should not taste the medication and it should not drip out the nose. This will decrease inflammation and also nasal mucous production.  You can also use this anytime you are developing nasal congestion.  Ok in the future to start these as soon as you feel you are developing nasal congestion, sinus pressure, increased nasal drainage.    IMPORTANT: When using other nasal inhaled products especially 12 hour sprays such as Afrin you should only use for a max of approximately 3 days, longer use could result in rebound congestion (congestion that develops because you're off the medication).    Mucinex DM Max, if issues with your blood pressure ok to just use plain mucinex product.    Ibuprofen as needed    Stay hydrated    Follow-up if symptoms worsen or do not resolve (call Monday if not improved).  Feel free to call with any questions or concerns.      "

## 2018-09-07 NOTE — PROGRESS NOTES
SUBJECTIVE:   Benjamín Mendoza is a 58 year old male who presents to clinic today for the following health issues:    HPI     Acute Illness   Acute illness concerns: Congestion  Onset: 5 days    Fever: no    Chills/Sweats: YES- chills    Headache (location?): YES- with cough    Sinus Pressure:YES    Conjunctivitis:  no    Ear Pain: no    Rhinorrhea: YES    Congestion: YES    Sore Throat: no     Rashes: no     Cough: YES-productive, with shortness of breath    Wheeze: no but when laughing or coughing raspy.    Decreased Appetite: no     Nausea: no     Vomiting: no     Diarrhea:  no     Dysuria/Freq.: no     Fatigue/Achiness: YES- both    Sick/Strep Exposure: no      Therapies Tried and outcome: NyQuil- not helpful, ibuprofen    - Started with congestion upper respiratory and then today moved into the chest.   - Phlegm is yellow. No history of lung problems  - Wife was also sick but not as bad as him.       Problem list and histories reviewed & adjusted, as indicated.  Additional history: as documented    Patient Active Problem List   Diagnosis     Headache     CARY (obstructive sleep apnea)     Tinnitus of both ears     Insomnia, unspecified type     Chronic midline low back pain without sciatica     Chronic pain syndrome     Advanced directives, counseling/discussion     Chondromalacia, knee, left     Other tear of medial meniscus of left knee as current injury, initial encounter     Gastroesophageal reflux disease without esophagitis     Past Surgical History:   Procedure Laterality Date     ARTHROSCOPY KNEE WITH MEDIAL MENISCECTOMY Left 6/8/2018    Procedure: ARTHROSCOPY KNEE WITH MEDIAL MENISCECTOMY;  Left knee arthroscopy with arthroscopic partial meniscectomy;  Surgeon: Dane Blanco DO;  Location: [facility]     COLONOSCOPY  06/12/07     COLONOSCOPY N/A 7/12/2017    Procedure: COLONOSCOPY;  colonoscopy;  Surgeon: Lake Mueller MD;  Location:  GI     HC TRABECULOPLASTY BY LASER SURGERY  2000    LASIK      SEPTOPLASTY, TURBINOPLASTY, COMBINED N/A 2016    Procedure: COMBINED SEPTOPLASTY, TURBINOPLASTY;  Surgeon: Ken Molina MD;  [facility]: [facility]         Social History   Substance Use Topics     Smoking status: Never Smoker     Smokeless tobacco: Never Used      Comment: no smokers in household     Alcohol use No     Family History   Problem Relation Age of Onset     Cerebrovascular Disease Mother 66     aneursym -  at 66     Other - See Comments Sister      Half sister, heart problems unsure what kind     Other - See Comments Brother      Was in a fire     No Known Problems Maternal Grandmother      No Known Problems Maternal Grandfather      Arthritis Maternal Aunt      Arthritis Maternal Uncle      Muscular Disorder Daughter 18     Small fiber neuropathy     Unknown/Adopted Father      Unknown/Adopted Paternal Grandmother      Unknown/Adopted Paternal Grandfather      Hypertension No family hx of      Diabetes No family hx of            ROS:  Constitutional, HEENT, cardiovascular, pulmonary, gi and gu systems are negative, except as otherwise noted.    OBJECTIVE:     /70  Pulse 82  Temp (P) 98.5  F (36.9  C) (Oral)  Wt 191 lb (86.6 kg)  SpO2 99%  BMI 27.41 kg/m2  Body mass index is 27.41 kg/(m^2).  GENERAL: healthy, alert and no distress  EYES: Eyes grossly normal to inspection, PERRL and conjunctivae and sclerae normal  HENT: normal cephalic/atraumatic, ear canals and TM's normal, nasal mucosa edematous , rhinorrhea clear, oropharynx clear, oral mucous membranes moist and sinuses: not tender  NECK: no adenopathy, no asymmetry, masses, or scars and thyroid normal to palpation  RESP: lungs clear to auscultation - no rales, rhonchi or wheezes  CV: regular rate and rhythm, normal S1 S2, no S3 or S4, no murmur, click or rub, no peripheral edema and peripheral pulses strong  MS: no gross musculoskeletal defects noted, no edema  SKIN: no suspicious lesions or  rashes    Diagnostic Test Results:  none     ASSESSMENT/PLAN:       ICD-10-CM    1. Viral URI with cough J06.9     B97.89        At this time likely viral URI with cough at this time, his exam shows some nasal passage edema but drainage is otherwise clear and lungs are clear.  Recommended starting an intensive OTC regimen through the weekend and if not improving would recommend trying a round of antibiotics (patient states he generally has been getting a round of azithromycin once per year around this time for the last few years).      See Patient Instructions - nasal rinses, nasal steroids, decongestants, staying hydrated.     Follow-up Monday if not improving (phone call), otherwise needs to be seen in  through the weekend if getting significantly worse.  Otherwise as needed.     Options for treatment and follow-up care were reviewed with the patient and/or guardian. Patient and/or guardian engaged in the decision making process and verbalized understanding of the options discussed and agreed with the final plan.     Johnathan Brennan PA-C  [facility]

## 2018-09-07 NOTE — MR AVS SNAPSHOT
"              After Visit Summary   9/7/2018    Benjamín Mendoza    MRN: [id number]           Patient Information     Date Of Birth          1960        Visit Information        Provider Department      9/7/2018 1:20 PM Johnathan Brennan PA-C [facility] Instructions    Nasal Saline: At the pharmacy you can find a \"Nettipot\" or NeilMed Nasal Rinse.  This is a salt solution that you mix with warm water.  I want you to perform nasal saline washes at least twice daily while you are sick.  You can do this anytime you feel like you are developing nasal congestion.  To properly utilize be sure you are leaning forward as far as you can and either tilt or squeeze slowly.  In the beginning this can be difficult to get to work properly but as the congestion is improved it will work easier.  If you don't use these properly you can feel as if you're drowning.     Nasal Steroid: Fluticasone/Flonase or Nasacort, These are OTC medications for allergies.  They are steroid sprays that are localized to the nose so no systemic effects. Two sprays each nostril once daily - allergist noted it is most effective if used before bed.  Ok to continue to use nasal saline as well.  When you spray it in the nose it should be up and out towards the eye on the side you are spraying the medication.  You should not taste the medication and it should not drip out the nose. This will decrease inflammation and also nasal mucous production.  You can also use this anytime you are developing nasal congestion.  Ok in the future to start these as soon as you feel you are developing nasal congestion, sinus pressure, increased nasal drainage.    IMPORTANT: When using other nasal inhaled products especially 12 hour sprays such as Afrin you should only use for a max of approximately 3 days, longer use could result in rebound congestion (congestion that develops because you're off the medication).    Mucinex DM Max, if issues with your " blood pressure ok to just use plain mucinex product.    Ibuprofen as needed    Stay hydrated    Follow-up if symptoms worsen or do not resolve (call Monday if not improved).  Feel free to call with any questions or concerns.              Follow-ups after your visit        Follow-up notes from your care team     Return in about 3 days (around 9/10/2018) for If not improving. .      Who to contact     If you have questions or need follow up information about today's clinic visit or your schedule please contact [facility] directly at [phone].  Normal or non-critical lab and imaging results will be communicated to you by [employer], letter or phone within 4 business days after the clinic has received the results. If you do not hear from us within 7 days, please contact the clinic through [employer] or phone. If you have a critical or abnormal lab result, we will notify you by phone as soon as possible.  Submit refill requests through Snapt or call your pharmacy and they will forward the refill request to us. Please allow 3 business days for your refill to be completed.          Additional Information About Your Visit        Snapt Information     Snapt gives you secure access to your electronic health record. If you see a primary care provider, you can also send messages to your care team and make appointments. If you have questions, please call your primary care clinic.  If you do not have a primary care provider, please call [phone] and they will assist you.        Care EveryWhere ID     This is your Care EveryWhere ID. This could be used by other organizations to access your [facility] medical records  [id number]        Your Vitals Were     Pulse Pulse Oximetry BMI (Body Mass Index)             82 99% 27.41 kg/m2          Blood Pressure from Last 3 Encounters:   09/07/18 132/70   06/20/18 122/60   06/08/18 122/84    Weight from Last 3 Encounters:   09/07/18 191 lb (86.6 kg)   06/20/18 184 lb  (83.5 kg)   06/08/18 188 lb 6.4 oz (85.5 kg)              Today, you had the following     No orders found for display       Primary Care Provider Office Phone # Fax #    Aleida Larson PA-C [phone] [address] [place] [address]        Equal Access to Services     [facility]: Hadii aad ku hadasho Soomaali, waaxda luqadaha, qaybta kaalmada adeegyada, waxay idiin hayaan adeeg khpatsh laEvangelistaaan . So [facility] [phone].    ATENCIÓN: Si habla español, tiene a cortes disposición servicios gratuitos de asistencia lingüística. [facility] [phone].    We comply with applicable federal civil rights laws and [place] laws. We do not discriminate on the basis of race, color, national origin, age, disability, sex, sexual orientation, or gender identity.            Thank you!     Thank you for choosing [facility]  for your care. Our goal is always to provide you with excellent care. Hearing back from our patients is one way we can continue to improve our services. Please take a few minutes to complete the written survey that you may receive in the mail after your visit with us. Thank you!             Your Updated Medication List - Protect others around you: Learn how to safely use, store and throw away your medicines at www.disposemymeds.org.          This list is accurate as of 9/7/18  1:37 PM.  Always use your most recent med list.                   Brand Name Dispense Instructions for use Diagnosis    aspirin 81 MG EC tablet     60 tablet    Take 1 tablet (81 mg) by mouth daily    Hyperlipidemia, unspecified hyperlipidemia type       atorvastatin 40 MG tablet    LIPITOR    90 tablet    TAKE 1 TABLET DAILY    Hyperlipidemia, unspecified hyperlipidemia type       clonazePAM 2 MG tablet    klonoPIN    90 tablet    Take 1 tablet (2 mg) by mouth At Bedtime Reported on 2/27/2017    Sleep disorder       HORIZANT 600 MG tablet   Generic drug:  gabapentin enacarbil      Take 600 mg by  mouth At Bedtime        metaxalone 800 MG tablet    SKELAXIN    90 tablet    TAKE 1 TABLET THREE TIMES A DAY    Chronic midline low back pain without sciatica       methocarbamol 750 MG tablet    ROBAXIN    30 tablet    Take 1 tablet (750 mg) by mouth every 6 hours as needed for muscle spasms (muscle spasm)    Other tear of medial meniscus of left knee as current injury, initial encounter       omeprazole 20 MG CR capsule    priLOSEC    90 capsule    Take 1 capsule (20 mg) by mouth daily    Gastroesophageal reflux disease without esophagitis       ONE-A-DAY MENS PO      Take 1 tablet by mouth every evening        oxyCODONE IR 5 MG tablet    ROXICODONE    30 tablet    Take 1-2 tablets (5-10 mg) by mouth every 4 hours as needed for pain or other (Moderate to Severe)    Other tear of medial meniscus of left knee as current injury, initial encounter       oxyCODONE-acetaminophen 5-325 MG per tablet    PERCOCET    90 tablet    Take 1-2 tablets by mouth every 4 hours as needed for pain (moderate to severe)    Chronic midline low back pain without sciatica

## 2018-12-11 ENCOUNTER — TRANSFERRED RECORDS (OUTPATIENT)
Dept: HEALTH INFORMATION MANAGEMENT | Facility: CLINIC | Age: 58
End: 2018-12-11

## 2019-01-02 ENCOUNTER — OFFICE VISIT (OUTPATIENT)
Dept: ORTHOPEDICS | Facility: OTHER | Age: 59
End: 2019-01-02
Payer: COMMERCIAL

## 2019-01-02 VITALS — DIASTOLIC BLOOD PRESSURE: 70 MMHG | SYSTOLIC BLOOD PRESSURE: 118 MMHG

## 2019-01-02 DIAGNOSIS — M94.262 CHONDROMALACIA, KNEE, LEFT: Primary | ICD-10-CM

## 2019-01-02 PROCEDURE — 20610 DRAIN/INJ JOINT/BURSA W/O US: CPT | Mod: LT | Performed by: ORTHOPAEDIC SURGERY

## 2019-01-02 RX ORDER — TRIAMCINOLONE ACETONIDE 40 MG/ML
40 INJECTION, SUSPENSION INTRA-ARTICULAR; INTRAMUSCULAR ONCE
Status: COMPLETED | OUTPATIENT
Start: 2019-01-02 | End: 2019-01-02

## 2019-01-02 RX ADMIN — TRIAMCINOLONE ACETONIDE 40 MG: 40 INJECTION, SUSPENSION INTRA-ARTICULAR; INTRAMUSCULAR at 14:56

## 2019-01-02 ASSESSMENT — PAIN SCALES - GENERAL: PAINLEVEL: MILD PAIN (3)

## 2019-01-02 NOTE — PROGRESS NOTES
Prior to injection, verified patient identity using patient's name and date of birth.  Due to injection administration, patient instructed to remain in clinic for 15 minutes  afterwards, and to report any adverse reaction to me immediately.    Joint injection was performed.      Drug Amount Wasted:  None.  Vial/Syringe: Single dose vial  Expiration Date:  4/2020    The following medication was given by MYRTLE Noble, CNP, DNP:     MEDICATION: Kenalog 40mg/1ml  ROUTE: Joint Injection  SITE: left knee  DOSE: 1 mL  LOT #: [id number]  : [employer]  EXPIRATION DATE:  4/2020  ND: [address]-0      Patient instructed to remain in clinic for 20 minutes afterwards, and to report any adverse reaction to me immediately.

## 2019-01-02 NOTE — PROGRESS NOTES
Office Visit-Follow up    Chief Complaint: Benjamín Mendoza is a 58 year old male who is being seen for   Chief Complaint   Patient presents with     RECHECK     left knee follow up     Surgical Followup     DOS:6/8/2018~Left knee arthroscopy with arthroscopic partial medial meniscectomy~7 months postop       History of Present Illness:   Returns for continued left medial knee achiness.  He reports the sharp pain from the meniscus tear preoperatively is gone.  Now more of a gradual achy soreness.  It is never significantly improved since his last visit.  It has actually worsened some.  Ibuprofen has not been helpful.  He rates it as mild to moderate at times.  It does cause him to limp at times.      REVIEW OF SYSTEMS  General: negative for, night sweats, dizziness, fatigue  Resp: No shortness of breath and no cough  CV: negative for chest pain, syncope or near-syncope  GI: negative for nausea, vomiting and diarrhea  : negative for dysuria and hematuria  Musculoskeletal: as above  Neurologic: negative for syncope   Hematologic: negative for bleeding disorder    Physical Exam:  Vitals: /70   BMI= There is no height or weight on file to calculate BMI.  Constitutional: healthy, alert and no acute distress   Psychiatric: mentation appears normal and affect normal/bright  NEURO: no focal deficits  RESP: Normal with easy respirations and no use of accessory muscles to breathe, no audible wheezing or retractions  CV: LLE:  no edema         SKIN: No erythema, rashes, excoriation, or breakdown. No evidence of infection. , Previous well healed incisions/laceration: Arthroscopy portals are well-healed  JOINT/EXTREMITIES:left knee: No effusion.  Tenderness along the medial joint line as well as the medial lateral patellar facets.  There is no instability although some slight pseudolaxity with valgus stressing.  Negative Lachman.  Patella tracks midline.  GAIT: not tested             Diagnostic Modalities:  Previous imaging  reviewed.  Independent visualization of the images was performed.      Impression: left knee chondromalacia/early primary osteoarthritis with a history of previous arthroscopy with medial meniscectomy    Plan:  All of the above pertinent physical exam and imaging modalities findings was reviewed with Benjamín.                                          INJECTION PROCEDURE:  The patient was counseled about an  injection, including discussion of risks (including infection), contents of the injection, rationale for performing the injection, and expected benefits of the injection. The skin was prepped with alcohol and betadine and then utilizing sterile technique an injection of the left knee joint from the anterolateral approach in the seated position was performed. The injection consisted 1ml of Kenalog (40mg per 1 ml) mixed with 3ml of 0.5% Marcaine. The patient tolerated the injection well, and there were no complications. The injection site was covered with a Band-Aid. The injection was performed by Randell Sosa, MYRTLE, CNP, DNP    Previous arthroscopy pictures reviewed as well.  Underlying chondromalacia.  He reports this pain is different than it was preoperatively.  He also has some pain with prolonged sitting and describes it as achy.  Most consistent with his underlying chondromalacia.  Recommend the injection.      Return to clinic 3, week(s), PRN, or sooner as needed for changes.  Re-x-ray on return: No    Richard Blanco D.O.

## 2019-01-02 NOTE — LETTER
1/2/2019         RE: Benjamín Mendoza  [address] [facility] [address]        Dear Colleague,    Thank you for referring your patient, Benjamín Mendoza, to the [facility]. Please see a copy of my visit note below.    Prior to injection, verified patient identity using patient's name and date of birth.  Due to injection administration, patient instructed to remain in clinic for 15 minutes  afterwards, and to report any adverse reaction to me immediately.    Joint injection was performed.      Drug Amount Wasted:  None.  Vial/Syringe: Single dose vial  Expiration Date:  4/2020    The following medication was given by Chester Sosa, APRN, CNP, DNP:     MEDICATION: Kenalog 40mg/1ml  ROUTE: Joint Injection  SITE: left knee  DOSE: 1 mL  LOT #: [id number]  : [employer]  EXPIRATION DATE:  4/2020  NDC: [address]-9      Patient instructed to remain in clinic for 20 minutes afterwards, and to report any adverse reaction to me immediately.              Office Visit-Follow up    Chief Complaint: Benjamín Mendoza is a 58 year old male who is being seen for   Chief Complaint   Patient presents with     RECHECK     left knee follow up     Surgical Followup     DOS:6/8/2018~Left knee arthroscopy with arthroscopic partial medial meniscectomy~7 months postop       History of Present Illness:   Returns for continued left medial knee achiness.  He reports the sharp pain from the meniscus tear preoperatively is gone.  Now more of a gradual achy soreness.  It is never significantly improved since his last visit.  It has actually worsened some.  Ibuprofen has not been helpful.  He rates it as mild to moderate at times.  It does cause him to limp at times.      REVIEW OF SYSTEMS  General: negative for, night sweats, dizziness, fatigue  Resp: No shortness of breath and no cough  CV: negative for chest pain, syncope or near-syncope  GI: negative for nausea, vomiting and diarrhea  : negative for  dysuria and hematuria  Musculoskeletal: as above  Neurologic: negative for syncope   Hematologic: negative for bleeding disorder    Physical Exam:  Vitals: /70   BMI= There is no height or weight on file to calculate BMI.  Constitutional: healthy, alert and no acute distress   Psychiatric: mentation appears normal and affect normal/bright  NEURO: no focal deficits  RESP: Normal with easy respirations and no use of accessory muscles to breathe, no audible wheezing or retractions  CV: LLE:  no edema         SKIN: No erythema, rashes, excoriation, or breakdown. No evidence of infection. , Previous well healed incisions/laceration: Arthroscopy portals are well-healed  JOINT/EXTREMITIES:left knee: No effusion.  Tenderness along the medial joint line as well as the medial lateral patellar facets.  There is no instability although some slight pseudolaxity with valgus stressing.  Negative Lachman.  Patella tracks midline.  GAIT: not tested             Diagnostic Modalities:  Previous imaging reviewed.  Independent visualization of the images was performed.      Impression: left knee chondromalacia/early primary osteoarthritis with a history of previous arthroscopy with medial meniscectomy    Plan:  All of the above pertinent physical exam and imaging modalities findings was reviewed with Benjamín.                                          INJECTION PROCEDURE:  The patient was counseled about an  injection, including discussion of risks (including infection), contents of the injection, rationale for performing the injection, and expected benefits of the injection. The skin was prepped with alcohol and betadine and then utilizing sterile technique an injection of the left knee joint from the anterolateral approach in the seated position was performed. The injection consisted 1ml of Kenalog (40mg per 1 ml) mixed with 3ml of 0.5% Marcaine. The patient tolerated the injection well, and there were no complications. The injection  site was covered with a Band-Aid. The injection was performed by Randell Sosa, APRN, CNP, DNP    Previous arthroscopy pictures reviewed as well.  Underlying chondromalacia.  He reports this pain is different than it was preoperatively.  He also has some pain with prolonged sitting and describes it as achy.  Most consistent with his underlying chondromalacia.  Recommend the injection.      Return to clinic 3, week(s), PRN, or sooner as needed for changes.  Re-x-ray on return: No    Richard Blanco D.O.          Again, thank you for allowing me to participate in the care of your patient.        Sincerely,        Dane Blanco, DO

## 2019-03-13 ENCOUNTER — MYC MEDICAL ADVICE (OUTPATIENT)
Dept: FAMILY MEDICINE | Facility: CLINIC | Age: 59
End: 2019-03-13

## 2019-03-13 NOTE — TELEPHONE ENCOUNTER
": 1960  PHONE #'s: [phone] (home)     PRESENTING PROBLEM:  C/o noticing blood coming out of his penis after he urinates- I notice it on my underwear. I don't see it. I notice small blood spots all over my underwear, like I am dribbling blood. Yesterday was the second time I noticed it.   It burned today,  when I start to urinate. That is the first time for this.     NURSING ASSESSMENT  Description:  As above. I don't feel any different otherwise- NO fever.\"  First time this happened was Friday, 3/8/19. NO pain. NO recent injury. Just snow shoveling. \"  Onset/duration:  3/8/19 first incident of noticing small dots of blood in his underwear.  Precip. factors:   Etiology unknown  Assoc. Sx:  NO fever, no flank pain, no back pain. No abd pain.   Improves/worsens Sx:  Worse today as noticed once it burned when he started to urinate.   Pain scale (1-10)   4/10  Sx specific meds:  NONE  Last exam/Tx:   Has NOT been seen for this. He originally made a My Chart appt for 4/3/19. RN called to discuss Sx. Informed he cannot wait that long to be seen for this. He should be seen within 24 hours.  That rishi was cancelled. He lives in [place] and would like to see if any openings there in the next 24 hours. Appt scheduled with Pauline Hernandez tomorrow, 3/14/19, at 7:40AM . Go to ER if starts to urinate large amount of bright red blood.     RECOMMENDED DISPOSITION:  See in 24 hours - or sooner if sudden fever, increased pains, or voiding large amount of bright red blood. Then needs to be seen in the ER.   Will comply with recommendation: YES  If further questions/concerns or if Sx do not improve, worsen or new Sx develop, call your PCP or [facility] as soon as possible.    NOTES:  Disposition was determined by the first positive assessment question, therefore all previous assessment questions were negative.  Informed to check provider manual or call insurance company to assure coverage.    Guideline used: " Urine, Abnormal Color  Telephone Triage Protocols for Nurses, Fifth Edition, Odessa Santo, JONNY  March 13, 2019

## 2019-03-13 NOTE — PROGRESS NOTES
SUBJECTIVE:   Benjamín Mendoza is a 59 year old male who presents to clinic today for the following health issues:      HPI  Genitourinary - Male  Onset: Last Friday     Description:   Dysuria (painful urination): YES- burning  Hematuria (blood in urine): YES  Frequency: no   Are you urinating at night : YES  Hesitancy (delay in urine): YES  Retention (unable to empty): YES  Decrease in urinary flow: YES  Incontinence: no     Progression of Symptoms:  same    Accompanying Signs & Symptoms:  Fever: no   Back/Flank pain: no   Urethral discharge: no   Testicle lumps/masses/pain: no   Nausea and/or vomiting: no   Abdominal pain: no     History:   History of frequent UTI's: no   History of kidney stones: no   History of hernias: no   Personal or Family history of Prostate problems: no  Sexually active: YES    Precipitating factors:       Alleviating factors:      No nausea fevers.  Has been a little more fatigued.  Wife with bronchitis.      Problem list and histories reviewed & adjusted, as indicated.    Is a stinging.  Has never had a urinary tract infeciton.      Non- smoker. No change in sexual partner.  Does not work around chemicals  Additional history: as documented    Patient Active Problem List   Diagnosis     Headache     CARY (obstructive sleep apnea)     Tinnitus of both ears     Insomnia, unspecified type     Chronic midline low back pain without sciatica     Chronic pain syndrome     Advanced directives, counseling/discussion     Chondromalacia, knee, left     Other tear of medial meniscus of left knee as current injury, initial encounter     Gastroesophageal reflux disease without esophagitis     Past Surgical History:   Procedure Laterality Date     ARTHROSCOPY KNEE WITH MEDIAL MENISCECTOMY Left 6/8/2018    Procedure: ARTHROSCOPY KNEE WITH MEDIAL MENISCECTOMY;  Left knee arthroscopy with arthroscopic partial meniscectomy;  Surgeon: Dane Blanco DO;  Location: [facility]     COLONOSCOPY  06/12/07      COLONOSCOPY N/A 2017    Procedure: COLONOSCOPY;  colonoscopy;  Surgeon: Lake Mueller MD;  Location:  GI     [facility] TRABECULOPLASTY BY LASER SURGERY      LASIK     SEPTOPLASTY, TURBINOPLASTY, COMBINED N/A 2016    Procedure: COMBINED SEPTOPLASTY, TURBINOPLASTY;  Surgeon: Ken Molina MD;  Location:  OR     [facility]         Social History     Tobacco Use     Smoking status: Never Smoker     Smokeless tobacco: Never Used     Tobacco comment: no smokers in household   Substance Use Topics     Alcohol use: No     Family History   Problem Relation Age of Onset     Cerebrovascular Disease Mother 66        aneursym -  at 66     Other - See Comments Sister         Half sister, heart problems unsure what kind     Other - See Comments Brother         Was in a fire     No Known Problems Maternal Grandmother      No Known Problems Maternal Grandfather      Arthritis Maternal Aunt      Arthritis Maternal Uncle      Muscular Disorder Daughter 18        Small fiber neuropathy     Unknown/Adopted Father      Unknown/Adopted Paternal Grandmother      Unknown/Adopted Paternal Grandfather      Hypertension No family hx of      Diabetes No family hx of          Current Outpatient Medications   Medication Sig Dispense Refill     aspirin EC 81 MG EC tablet Take 1 tablet (81 mg) by mouth daily 60 tablet 0     atorvastatin (LIPITOR) 40 MG tablet TAKE 1 TABLET DAILY 90 tablet 3     HORIZANT 600 MG tablet Take 600 mg by mouth At Bedtime       Multiple Vitamin (ONE-A-DAY MENS PO) Take 1 tablet by mouth every evening        omeprazole (PRILOSEC) 20 MG CR capsule Take 1 capsule (20 mg) by mouth daily 90 capsule 3     sulfamethoxazole-trimethoprim (BACTRIM DS/SEPTRA DS) 800-160 MG tablet Take 1 tablet by mouth 2 times daily for 7 days 14 tablet 0     clonazePAM (KLONOPIN) 2 MG tablet Take 1 tablet (2 mg) by mouth At Bedtime Reported on 2017 (Patient not taking: Reported on 2019) 90 tablet 0      "metaxalone (SKELAXIN) 800 MG tablet TAKE 1 TABLET THREE TIMES A DAY (Patient not taking: Reported on 1/2/2019) 90 tablet 2     methocarbamol (ROBAXIN) 750 MG tablet Take 1 tablet (750 mg) by mouth every 6 hours as needed for muscle spasms (muscle spasm) (Patient not taking: Reported on 1/2/2019) 30 tablet 0     oxyCODONE-acetaminophen (PERCOCET) 5-325 MG per tablet Take 1-2 tablets by mouth every 4 hours as needed for pain (moderate to severe) (Patient not taking: Reported on 3/14/2019) 90 tablet 0     Allergies   Allergen Reactions     Sumatriptan Nausea and Vomiting     Migraine medications       ROS:  Constitutional, HEENT, cardiovascular, pulmonary, gi and gu systems are negative, except as otherwise noted.    OBJECTIVE:     /72   Pulse 74   Temp 98.4  F (36.9  C) (Temporal)   Resp 16   Ht 1.778 m (5' 10\")   Wt 88 kg (194 lb)   BMI 27.84 kg/m    Body mass index is 27.84 kg/m .  GENERAL: healthy, alert and no distress  NECK: no adenopathy, no asymmetry, masses, or scars and thyroid normal to palpation  RESP: lungs clear to auscultation - no rales, rhonchi or wheezes  CV: regular rate and rhythm, normal S1 S2, no S3 or S4, no murmur, click or rub, no peripheral edema and peripheral pulses strong  ABDOMEN: soft, nontender, no hepatosplenomegaly, no masses and bowel sounds normal  MS: no gross musculoskeletal defects noted, no edema  BACK: no CVA tenderness, no paralumbar tenderness    Diagnostic Test Results:  Results for orders placed or performed in visit on 03/14/19 (from the past 24 hour(s))   *UA reflex to Microscopic and Culture ([place] and [facility] (except [place] and [place])   Result Value Ref Range    Color Urine Yellow     Appearance Urine Clear     Glucose Urine Negative NEG^Negative mg/dL    Bilirubin Urine Negative NEG^Negative    Ketones Urine Negative NEG^Negative mg/dL    Specific Gravity Urine 1.015 1.003 - 1.035    Blood Urine Negative NEG^Negative    pH Urine 6.0 5.0 - 7.0 " pH    Protein Albumin Urine Negative NEG^Negative mg/dL    Urobilinogen Urine 0.2 0.2 - 1.0 EU/dL    Nitrite Urine Negative NEG^Negative    Leukocyte Esterase Urine Negative NEG^Negative    Source Unspecified Urine        ASSESSMENT/PLAN:       1. Painful urination  His UA is clean today.  No pelvic pain or pressure or CVA tenderness.  No change in sexual partner.  Will treat with bactrim to see if symptoms resolve- if not should see Urology.    - *UA reflex to Microscopic and Culture ([place] and [facility] (except [place] and [place])  - sulfamethoxazole-trimethoprim (BACTRIM DS/SEPTRA DS) 800-160 MG tablet; Take 1 tablet by mouth 2 times daily for 7 days  Dispense: 14 tablet; Refill: 0      Sanam Reynoso NP  [facility]

## 2019-03-14 ENCOUNTER — OFFICE VISIT (OUTPATIENT)
Dept: FAMILY MEDICINE | Facility: OTHER | Age: 59
End: 2019-03-14
Payer: COMMERCIAL

## 2019-03-14 VITALS
HEIGHT: 70 IN | HEART RATE: 74 BPM | DIASTOLIC BLOOD PRESSURE: 72 MMHG | TEMPERATURE: 98.4 F | SYSTOLIC BLOOD PRESSURE: 126 MMHG | BODY MASS INDEX: 27.77 KG/M2 | RESPIRATION RATE: 16 BRPM | WEIGHT: 194 LBS

## 2019-03-14 DIAGNOSIS — R30.9 PAINFUL URINATION: Primary | ICD-10-CM

## 2019-03-14 LAB
ALBUMIN UR-MCNC: NEGATIVE MG/DL
APPEARANCE UR: CLEAR
BILIRUB UR QL STRIP: NEGATIVE
COLOR UR AUTO: YELLOW
GLUCOSE UR STRIP-MCNC: NEGATIVE MG/DL
HGB UR QL STRIP: NEGATIVE
KETONES UR STRIP-MCNC: NEGATIVE MG/DL
LEUKOCYTE ESTERASE UR QL STRIP: NEGATIVE
NITRATE UR QL: NEGATIVE
PH UR STRIP: 6 PH (ref 5–7)
SOURCE: NORMAL
SP GR UR STRIP: 1.01 (ref 1–1.03)
UROBILINOGEN UR STRIP-ACNC: 0.2 EU/DL (ref 0.2–1)

## 2019-03-14 PROCEDURE — 81003 URINALYSIS AUTO W/O SCOPE: CPT | Performed by: NURSE PRACTITIONER

## 2019-03-14 PROCEDURE — 99213 OFFICE O/P EST LOW 20 MIN: CPT | Performed by: NURSE PRACTITIONER

## 2019-03-14 PROCEDURE — 87086 URINE CULTURE/COLONY COUNT: CPT | Performed by: NURSE PRACTITIONER

## 2019-03-14 RX ORDER — SULFAMETHOXAZOLE/TRIMETHOPRIM 800-160 MG
1 TABLET ORAL 2 TIMES DAILY
Qty: 14 TABLET | Refills: 0 | Status: SHIPPED | OUTPATIENT
Start: 2019-03-14 | End: 2019-05-01

## 2019-03-14 ASSESSMENT — MIFFLIN-ST. JEOR: SCORE: 1711.23

## 2019-03-14 ASSESSMENT — PAIN SCALES - GENERAL: PAINLEVEL: NO PAIN (0)

## 2019-03-15 LAB
BACTERIA SPEC CULT: NO GROWTH
Lab: NORMAL
SPECIMEN SOURCE: NORMAL

## 2019-04-17 ENCOUNTER — ANCILLARY PROCEDURE (OUTPATIENT)
Dept: GENERAL RADIOLOGY | Facility: OTHER | Age: 59
End: 2019-04-17
Attending: ORTHOPAEDIC SURGERY
Payer: COMMERCIAL

## 2019-04-17 ENCOUNTER — OFFICE VISIT (OUTPATIENT)
Dept: ORTHOPEDICS | Facility: OTHER | Age: 59
End: 2019-04-17
Payer: COMMERCIAL

## 2019-04-17 VITALS
SYSTOLIC BLOOD PRESSURE: 120 MMHG | DIASTOLIC BLOOD PRESSURE: 72 MMHG | HEIGHT: 70 IN | BODY MASS INDEX: 26.28 KG/M2 | WEIGHT: 183.6 LBS

## 2019-04-17 DIAGNOSIS — M17.12 PRIMARY OSTEOARTHRITIS OF LEFT KNEE: Primary | ICD-10-CM

## 2019-04-17 DIAGNOSIS — S83.242A OTHER TEAR OF MEDIAL MENISCUS OF LEFT KNEE AS CURRENT INJURY, INITIAL ENCOUNTER: ICD-10-CM

## 2019-04-17 PROCEDURE — 99214 OFFICE O/P EST MOD 30 MIN: CPT | Performed by: ORTHOPAEDIC SURGERY

## 2019-04-17 PROCEDURE — 73564 X-RAY EXAM KNEE 4 OR MORE: CPT | Mod: LT

## 2019-04-17 RX ORDER — DICLOFENAC SODIUM 75 MG/1
75 TABLET, DELAYED RELEASE ORAL 2 TIMES DAILY
Qty: 20 TABLET | Refills: 0 | Status: SHIPPED | OUTPATIENT
Start: 2019-04-17 | End: 2019-08-08

## 2019-04-17 ASSESSMENT — MIFFLIN-ST. JEOR: SCORE: 1664.05

## 2019-04-17 ASSESSMENT — PAIN SCALES - GENERAL: PAINLEVEL: MILD PAIN (3)

## 2019-04-17 NOTE — LETTER
"    4/17/2019         RE: Benjamín Mendoza  [address] [place] [id number]        Dear Colleague,    Thank you for referring your patient, Benjamín Mendoza, to the [facility]. Please see a copy of my visit note below.    Office Visit-Follow up    Chief Complaint: Benjamín Mendoza is a 59 year old male who is being seen for   Chief Complaint   Patient presents with     RECHECK     left knee follow up     Surgical Followup     DOS:6/8/2018~Left knee arthroscopy with arthroscopic partial medial meniscectomy~10 months postop       History of Present Illness:   Today's visit:  Returns for left medial knee pain.  Different pain than in the past.  Rates it is moderate to severe at times.  Describes it as aching and sharp.  Nonradiating.  No new injuries.  On his last visit he received an intra-articular injection which provided a little less than 6 weeks relief.  He is been taken ibuprofen.  He presents with his wife.    January 2, 2018 visit:  Returns for continued left medial knee achiness.  He reports the sharp pain from the meniscus tear preoperatively is gone.  Now more of a gradual achy soreness.  It is never significantly improved since his last visit.  It has actually worsened some.  Ibuprofen has not been helpful.  He rates it as mild to moderate at times.  It does cause him to limp at times.      REVIEW OF SYSTEMS  General: negative for, night sweats, dizziness, fatigue  Resp: No shortness of breath and no cough  CV: negative for chest pain, syncope or near-syncope  GI: negative for nausea, vomiting and diarrhea  : negative for dysuria and hematuria  Musculoskeletal: as above  Neurologic: negative for syncope   Hematologic: negative for bleeding disorder    Physical Exam:  Vitals: /72   Ht 1.778 m (5' 10\")   Wt 83.3 kg (183 lb 9.6 oz)   BMI 26.34 kg/m     BMI= Body mass index is 26.34 kg/m .  Constitutional: healthy, alert and no acute distress   Psychiatric: mentation appears normal " and affect normal/bright  NEURO: no focal deficits  RESP: Normal with easy respirations and no use of accessory muscles to breathe, no audible wheezing or retractions  CV: LLE:  no edema         Regular rate and rhythm by palpation  SKIN: No erythema, rashes, excoriation, or breakdown. No evidence of infection.   JOINT/EXTREMITIES:left knee: Portals are well-healed.  No effusion.  Tenderness along the medial joint line pseudolaxity medial valgus stressing.  Collateral ligaments are intact.  Active motion 0-135 degrees.  Patella tracks midline.  GAIT: not tested             Diagnostic Modalities:  left knee X-ray: Medial joint space narrowing.  Lateral patellofemoral with fairly well-preserved.  No fractures or dislocations.  Independent visualization of the images was performed.      Impression: left knee primary osteoarthritis    Plan:  All of the above pertinent physical exam and imaging modalities findings was reviewed with Benjamín and his wife.    Options discussed.  X-rays show advancement of the arthritis.  Previous arthroscopy showed fairly significant patellofemoral arthritis.    Last injection only provided approximately 6 weeks relief.  Ibuprofen has been helpful although not complete.  We discussed proceeding with diclofenac orally as well as physical therapy.  Risks reviewed.  We did discuss bracing which he declined today.      Return to clinic 4-6 , week(s), PRN, or sooner as needed for changes.  Re-x-ray on return: No    Richard Blanco D.O.          Again, thank you for allowing me to participate in the care of your patient.        Sincerely,        Dane Blanco, DO

## 2019-04-17 NOTE — PROGRESS NOTES
"Office Visit-Follow up    Chief Complaint: Benjamín Mendoza is a 59 year old male who is being seen for   Chief Complaint   Patient presents with     RECHECK     left knee follow up     Surgical Followup     DOS:6/8/2018~Left knee arthroscopy with arthroscopic partial medial meniscectomy~10 months postop       History of Present Illness:   Today's visit:  Returns for left medial knee pain.  Different pain than in the past.  Rates it is moderate to severe at times.  Describes it as aching and sharp.  Nonradiating.  No new injuries.  On his last visit he received an intra-articular injection which provided a little less than 6 weeks relief.  He is been taken ibuprofen.  He presents with his wife.    January 2, 2018 visit:  Returns for continued left medial knee achiness.  He reports the sharp pain from the meniscus tear preoperatively is gone.  Now more of a gradual achy soreness.  It is never significantly improved since his last visit.  It has actually worsened some.  Ibuprofen has not been helpful.  He rates it as mild to moderate at times.  It does cause him to limp at times.      REVIEW OF SYSTEMS  General: negative for, night sweats, dizziness, fatigue  Resp: No shortness of breath and no cough  CV: negative for chest pain, syncope or near-syncope  GI: negative for nausea, vomiting and diarrhea  : negative for dysuria and hematuria  Musculoskeletal: as above  Neurologic: negative for syncope   Hematologic: negative for bleeding disorder    Physical Exam:  Vitals: /72   Ht 1.778 m (5' 10\")   Wt 83.3 kg (183 lb 9.6 oz)   BMI 26.34 kg/m    BMI= Body mass index is 26.34 kg/m .  Constitutional: healthy, alert and no acute distress   Psychiatric: mentation appears normal and affect normal/bright  NEURO: no focal deficits  RESP: Normal with easy respirations and no use of accessory muscles to breathe, no audible wheezing or retractions  CV: LLE:  no edema         Regular rate and rhythm by palpation  SKIN: No " erythema, rashes, excoriation, or breakdown. No evidence of infection.   JOINT/EXTREMITIES:left knee: Portals are well-healed.  No effusion.  Tenderness along the medial joint line pseudolaxity medial valgus stressing.  Collateral ligaments are intact.  Active motion 0-135 degrees.  Patella tracks midline.  GAIT: not tested             Diagnostic Modalities:  left knee X-ray: Medial joint space narrowing.  Lateral patellofemoral with fairly well-preserved.  No fractures or dislocations.  Independent visualization of the images was performed.      Impression: left knee primary osteoarthritis    Plan:  All of the above pertinent physical exam and imaging modalities findings was reviewed with Benjamín and his wife.    Options discussed.  X-rays show advancement of the arthritis.  Previous arthroscopy showed fairly significant patellofemoral arthritis.    Last injection only provided approximately 6 weeks relief.  Ibuprofen has been helpful although not complete.  We discussed proceeding with diclofenac orally as well as physical therapy.  Risks reviewed.  We did discuss bracing which he declined today.      Return to clinic 4-6 , week(s), PRN, or sooner as needed for changes.  Re-x-ray on return: No    Richard Blanco D.O.

## 2019-04-26 NOTE — PROGRESS NOTES
urine  SUBJECTIVE:   Benjamín Mendoza is a 59 year old male who presents to clinic today for the following health issues:    The 10-year ASCVD risk score (Naomisalud FERNANDEZ Jr., et al., 2013) is: 5.1%    Values used to calculate the score:      Age: 59 years      Sex: Male      Is Non- : No      Diabetic: No      Tobacco smoker: No      Systolic Blood Pressure: 120 mmHg      Is BP treated: No      HDL Cholesterol: 36 mg/dL      Total Cholesterol: 134 mg/dL  Patient is eligible for use of low-dose aspirin for primary prevention of heart attack and stroke.  Provider has discussed aspirin with patient and our decision was:     Prescribe:  Daily low-dose aspirin recommended for primary prevention, patient agrees with plan.        History of Present Illness     Hyperlipidemia:     Low fat/chol diet rating::  Fair    Taking Statins::  YES    Side effects from hypolipidemia medication::  Possible muscle aches from Statin (upper thighs )    Lipid Medications or Supplements::  None    Diet:  Regular (no restrictions)  Frequency of exercise:  None  Taking medications regularly:  Yes  Medication side effects:  None  Additional concerns today:  No    GERD/Heartburn      Duration: ongoing     Description (location/character/radiation): as long as he is taking med doesn't feel this at all, he states this med has changed his life    Intensity:  0/10    Accompanying signs and symptoms:  food getting stuck: no   nausea/vomiting/blood: no   abdominal pain: no   black/tarry or bloody stools: no :    History (similar episodes/previous evaluation): YES    Precipitating or alleviating factors:  worse with no particular food or drink.  current NSAID/Aspirin use: YES- ibuprofen and aspirin     Therapies tried and outcome: Omeprazole (Prilosec) and medication helpful    Additional history: he is requesting a refill of percocet.  He will take a half a Percocet twice a week typically for either his knee pain or his chronic back  pain.  He never takes this at work because he is so sensitive to this medication that he actually needs to lay down shortly after taking it.  He only received one prescription in 2018.  Urine drug test is pending    He is also here to follow-up on hematuria.  He was seen by Sanam Reynoso for this on March 14.  She treated him with a sulfa-based antibiotic and told him if symptoms persist he must see urology.  Patient states that he had 3 different episodes of spots of blood in his underwear not associated with urination.  He did not have any trauma in the dysuria he had been experiencing immediately went away with the antibiotic.  It has not come back again since.    Reviewed and updated as needed this visit by clinical staff         Reviewed and updated as needed this visit by Provider             BP Readings from Last 3 Encounters:   05/01/19 138/76   04/17/19 120/72   03/14/19 126/72    Wt Readings from Last 3 Encounters:   05/01/19 85.7 kg (189 lb)   04/17/19 83.3 kg (183 lb 9.6 oz)   03/14/19 88 kg (194 lb)                  Labs reviewed in EPIC    ROS:  CONSTITUTIONAL: NEGATIVE for fever, chills, change in weight  RESP: NEGATIVE for significant cough or SOB  CV: NEGATIVE for chest pain, palpitations or peripheral edema  : Spots of blood on his underwear now resolved  MUSCULOSKELETAL: Knee pain and chronic back pain    OBJECTIVE:     /76   Pulse 70   Temp 98.2  F (36.8  C) (Temporal)   Resp 16   Wt 85.7 kg (189 lb)   BMI 27.12 kg/m    Body mass index is 27.12 kg/m .  GENERAL: healthy, alert and no distress  NECK: no adenopathy, no asymmetry, masses, or scars and thyroid normal to palpation  RESP: lungs clear to auscultation - no rales, rhonchi or wheezes  CV: regular rate and rhythm, normal S1 S2, no S3 or S4, no murmur, click or rub, no peripheral edema and peripheral pulses strong  ABDOMEN: soft, nontender, no hepatosplenomegaly, no masses and bowel sounds normal  MS: no gross musculoskeletal  defects noted, no edema  PSYCH: mentation appears normal, affect normal/bright    Diagnostic Test Results:  Results for orders placed or performed in visit on 05/01/19 (from the past 24 hour(s))   UA reflex to Microscopic and Culture   Result Value Ref Range    Color Urine Yellow     Appearance Urine Clear     Glucose Urine Negative NEG^Negative mg/dL    Bilirubin Urine Negative NEG^Negative    Ketones Urine Negative NEG^Negative mg/dL    Specific Gravity Urine 1.010 1.003 - 1.035    Blood Urine Negative NEG^Negative    pH Urine 6.0 5.0 - 7.0 pH    Protein Albumin Urine Negative NEG^Negative mg/dL    Urobilinogen Urine 0.2 0.2 - 1.0 EU/dL    Nitrite Urine Negative NEG^Negative    Leukocyte Esterase Urine Negative NEG^Negative    Source Unspecified Urine        ASSESSMENT/PLAN:         1. Hyperlipidemia, unspecified hyperlipidemia type  Continue current meds, he is due for fasting labs he will return to clinic to do that at a later time as he is not fasting today  - atorvastatin (LIPITOR) 40 MG tablet; Take 1 tablet (40 mg) by mouth daily  Dispense: 90 tablet; Refill: 0  - Lipid panel reflex to direct LDL Fasting; Future  - **Comprehensive metabolic panel FUTURE anytime; Future    2. Gastroesophageal reflux disease without esophagitis  Continue daily use, another provider recommended twice daily usage but he does not believe he needs that as his symptoms are well controlled on 1 pill.  I agree with patient  - omeprazole (PRILOSEC) 20 MG DR capsule; Take 1 capsule (20 mg) by mouth daily  Dispense: 90 capsule; Refill: 3    3. Microscopic hematuria  Resolved, difficult to say what this could have been from.  Offered him a referral to urology to review his symptoms, he declined for now we would consider immediate referral to urology if this happens again  - UA reflex to Microscopic and Culture    4. Chronic midline low back pain without sciatica  If he needs more than 1-2 prescriptions a year of Percocet he needs to have  a narcotic contract which we reviewed today, he is no longer taking benzodiazepines I printed out the Narcan prescription for him in the event that sleep medicine prescribed this once again I told him it is unsafe to use both benzodiazepines and narcotics together patient also does not use narcotics on a routine basis  - Pain Drug Scr UR W Rptd Meds  - oxyCODONE-acetaminophen (PERCOCET) 5-325 MG tablet; Take 1-2 tablets by mouth every 4 hours as needed for pain (moderate to severe)  Dispense: 90 tablet; Refill: 0  - naloxone (NARCAN) 4 MG/0.1ML nasal spray; Spray 1 spray (4 mg) into one nostril alternating nostrils once as needed for opioid reversal every 2-3 minutes until assistance arrives  Dispense: 0.2 mL; Refill: 0    5. Screening for HIV (human immunodeficiency virus)  Declined      Recheck 1 year or when he needs refills of Percocet    Aleida Larson PA-C  [facility]  Answers for HPI/ROS submitted by the patient on 5/1/2019   Chronic problems general questions HPI Form  If you checked off any problems, how difficult have these problems made it for you to do your work, take care of things at home, or get along with other people?: Somewhat difficult  PHQ9 TOTAL SCORE: 6  GENARO 7 TOTAL SCORE: 4

## 2019-05-01 ENCOUNTER — OFFICE VISIT (OUTPATIENT)
Dept: FAMILY MEDICINE | Facility: OTHER | Age: 59
End: 2019-05-01
Payer: COMMERCIAL

## 2019-05-01 ENCOUNTER — HOSPITAL ENCOUNTER (OUTPATIENT)
Dept: PHYSICAL THERAPY | Facility: OTHER | Age: 59
Setting detail: THERAPIES SERIES
End: 2019-05-01
Attending: ORTHOPAEDIC SURGERY
Payer: COMMERCIAL

## 2019-05-01 VITALS
DIASTOLIC BLOOD PRESSURE: 76 MMHG | WEIGHT: 189 LBS | HEART RATE: 70 BPM | SYSTOLIC BLOOD PRESSURE: 138 MMHG | TEMPERATURE: 98.2 F | BODY MASS INDEX: 27.12 KG/M2 | RESPIRATION RATE: 16 BRPM

## 2019-05-01 DIAGNOSIS — M54.50 CHRONIC MIDLINE LOW BACK PAIN WITHOUT SCIATICA: ICD-10-CM

## 2019-05-01 DIAGNOSIS — Z11.4 SCREENING FOR HIV (HUMAN IMMUNODEFICIENCY VIRUS): ICD-10-CM

## 2019-05-01 DIAGNOSIS — G89.29 CHRONIC MIDLINE LOW BACK PAIN WITHOUT SCIATICA: ICD-10-CM

## 2019-05-01 DIAGNOSIS — K21.9 GASTROESOPHAGEAL REFLUX DISEASE WITHOUT ESOPHAGITIS: ICD-10-CM

## 2019-05-01 DIAGNOSIS — M17.12 PRIMARY OSTEOARTHRITIS OF LEFT KNEE: ICD-10-CM

## 2019-05-01 DIAGNOSIS — R31.29 MICROSCOPIC HEMATURIA: Primary | ICD-10-CM

## 2019-05-01 DIAGNOSIS — E78.5 HYPERLIPIDEMIA, UNSPECIFIED HYPERLIPIDEMIA TYPE: ICD-10-CM

## 2019-05-01 PROCEDURE — 97530 THERAPEUTIC ACTIVITIES: CPT | Mod: GP | Performed by: PHYSICAL THERAPIST

## 2019-05-01 PROCEDURE — 99214 OFFICE O/P EST MOD 30 MIN: CPT | Performed by: PHYSICIAN ASSISTANT

## 2019-05-01 PROCEDURE — 99000 SPECIMEN HANDLING OFFICE-LAB: CPT | Performed by: PHYSICIAN ASSISTANT

## 2019-05-01 PROCEDURE — 81003 URINALYSIS AUTO W/O SCOPE: CPT | Performed by: PHYSICIAN ASSISTANT

## 2019-05-01 PROCEDURE — 80307 DRUG TEST PRSMV CHEM ANLYZR: CPT | Mod: 90 | Performed by: PHYSICIAN ASSISTANT

## 2019-05-01 PROCEDURE — 97162 PT EVAL MOD COMPLEX 30 MIN: CPT | Mod: GP | Performed by: PHYSICAL THERAPIST

## 2019-05-01 PROCEDURE — 97110 THERAPEUTIC EXERCISES: CPT | Mod: GP | Performed by: PHYSICAL THERAPIST

## 2019-05-01 RX ORDER — ATORVASTATIN CALCIUM 40 MG/1
40 TABLET, FILM COATED ORAL DAILY
Qty: 90 TABLET | Refills: 0 | Status: SHIPPED | OUTPATIENT
Start: 2019-05-01 | End: 2019-08-20

## 2019-05-01 RX ORDER — OXYCODONE AND ACETAMINOPHEN 5; 325 MG/1; MG/1
1-2 TABLET ORAL EVERY 4 HOURS PRN
Qty: 90 TABLET | Refills: 0 | Status: SHIPPED | OUTPATIENT
Start: 2019-05-01 | End: 2022-06-20

## 2019-05-01 ASSESSMENT — ANXIETY QUESTIONNAIRES
4. TROUBLE RELAXING: SEVERAL DAYS
6. BECOMING EASILY ANNOYED OR IRRITABLE: SEVERAL DAYS
5. BEING SO RESTLESS THAT IT IS HARD TO SIT STILL: SEVERAL DAYS
2. NOT BEING ABLE TO STOP OR CONTROL WORRYING: NOT AT ALL
GAD7 TOTAL SCORE: 4
GAD7 TOTAL SCORE: 4
7. FEELING AFRAID AS IF SOMETHING AWFUL MIGHT HAPPEN: NOT AT ALL
7. FEELING AFRAID AS IF SOMETHING AWFUL MIGHT HAPPEN: NOT AT ALL
1. FEELING NERVOUS, ANXIOUS, OR ON EDGE: NOT AT ALL
3. WORRYING TOO MUCH ABOUT DIFFERENT THINGS: SEVERAL DAYS
GAD7 TOTAL SCORE: 4

## 2019-05-01 ASSESSMENT — PAIN SCALES - GENERAL: PAINLEVEL: MODERATE PAIN (4)

## 2019-05-01 ASSESSMENT — PATIENT HEALTH QUESTIONNAIRE - PHQ9
SUM OF ALL RESPONSES TO PHQ QUESTIONS 1-9: 6
SUM OF ALL RESPONSES TO PHQ QUESTIONS 1-9: 6
10. IF YOU CHECKED OFF ANY PROBLEMS, HOW DIFFICULT HAVE THESE PROBLEMS MADE IT FOR YOU TO DO YOUR WORK, TAKE CARE OF THINGS AT HOME, OR GET ALONG WITH OTHER PEOPLE: SOMEWHAT DIFFICULT

## 2019-05-02 ASSESSMENT — ANXIETY QUESTIONNAIRES: GAD7 TOTAL SCORE: 4

## 2019-05-02 NOTE — PROGRESS NOTES
05/01/19 1520   General Information   Type of Visit Initial OP Ortho PT Evaluation   Start of Care Date 05/01/19   Referring Physician Dr. Blanco   Patient/Family Goals Statement To control symptoms so doesn't have to have injections   Orders Evaluate and Treat   Orders Comment Core,Hamstring, Quad,Hip flexor.   Date of Order 04/17/19   Insurance Type [employer]   Insurance Comments/Visits Authorized No limits no exclusions   Medical Diagnosis Primary osteoarthritis of the left knee.   Surgical/Medical history reviewed Yes   Precautions/Limitations no known precautions/limitations   Weight-Bearing Status - LUE full weight-bearing   Weight-Bearing Status - RUE full weight-bearing   Weight-Bearing Status - LLE full weight-bearing   Weight-Bearing Status - RLE full weight-bearing   Body Part(s)   Body Part(s) Knee   Presentation and Etiology   Pertinent history of current problem (include personal factors and/or comorbidities that impact the POC) Benjamín reports that he started with left knee pain in June/July of 2017 not sure if it was the arthritis or the meniscus. Then February of 2018 he was on vacation for 2 weeks, walking in the sand seemed to really exacerbate it. He came back in a lot of pain, found he had a torn meniscus had surgery on 6/8/2018 it took a long time to heal, felt pretty good until October and went in for an injection in December of 2018. Now he wants to help it vs. getting injections. It was really bad up until about 2 weeks ago, has been better now.    Impairments A. Pain;D. Decreased ROM;E. Decreased flexibility;F. Decreased strength and endurance;H. Impaired gait;C. Swelling   Functional Limitations perform activities of daily living;perform required work activities;perform desired leisure / sports activities   Symptom Location Medial left knee, right low back.   How/Where did it occur From Degenerative Joint Disease   Onset date of current episode/exacerbation 10/01/18   Chronicity Chronic    Pain rating (0-10 point scale) Other   Pain rating comment 2/10 typical and 8/10 at worst   Pain quality A. Sharp;D. Burning   Frequency of pain/symptoms A. Constant   Pain/symptoms are: The same all the time   Pain/symptoms exacerbated by A. Sitting;G. Certain positions;L. Work tasks;K. Home tasks;J. ADL   Pain/symptoms eased by B. Walking;H. Cold;I. OTC medication(s)   Progression of symptoms since onset: Worsened   Current / Previous Interventions   Diagnostic Tests: X-ray   X-ray Results Results   X-ray results Medial degeneration   Prior Level of Function   Prior Level of Function-Mobility Very active   Prior Level of Function-ADLs Independent   Current Level of Function   Current Community Support Family/friend caregiver   Patient role/employment history A. Employed   Employment Comments Works on elevators at the [employer]   Living environment House[facility]   Home/community accessibility 3 steps to enter home   Current equipment-Gait/Locomotion None   Current equipment-ADL None   Fall Risk Screen   Fall screen completed by PT   Have you fallen 2 or more times in the past year? No   Have you fallen and had an injury in the past year? No   Is patient a fall risk? No   Functional Scales   Functional Scales Other   Other Scales  LEFS 22/80   Knee Objective Findings   Side (if bilateral, select both right and left) Left   Observation Demonstrates some moderate pain behaviors   Integumentary  Intact, some mild swelling noted at the anterior knee.    Posture Slouchy sitting posture   Gait/Locomotion Demonstrates an antalgic gait pattern with a slight limp on the left. Feet are forward facing, has limited full knee extension and left lower extremity is shorter than the right.    Balance/Proprioception (Single Leg Stance) NT   Foot Position In Standing Foreward facing   Knee ROM Comment Stiffness in both directions   Knee/Hip Strength Comments Bilateral hip extension is 3-4/5   Left Knee Extension AROM 3 degrees  from full extension   Left Knee Flexion AROM 113 degrees   Left Knee Flexion Strength 5/5   Left Knee Extension Strength 5/5   Left Hip Abduction Strength 5/5   Left Quad Set Strength 5/5   L VMO Strength Atrophied bilaterally   Planned Therapy Interventions   Planned Therapy Interventions joint mobilization;manual therapy;neuromuscular re-education;ROM;strengthening;stretching   Planned Modality Interventions   Planned Modality Interventions Cryotherapy;Electrical stimulation;Ultrasound   Planned Modality Interventions Comments Will use as needed for symptom control.    Clinical Impression   Criteria for Skilled Therapeutic Interventions Met yes, treatment indicated   PT Diagnosis Left knee pain, decreased ROM, hip extension weakness, postural strain.   Influenced by the following impairments Pain, limited ROM, weakness.   Functional limitations due to impairments Squatting, pronlonged walking, running, negotiating stairs without pain.    Clinical Presentation Evolving/Changing   Clinical Presentation Rationale Clinical judgement, chronic pain, multiple body systems affecting.    Clinical Decision Making (Complexity) Moderate complexity   Therapy Frequency 1 time/week   Predicted Duration of Therapy Intervention (days/wks) 8 visits   Risk & Benefits of therapy have been explained Yes   Patient, Family & other staff in agreement with plan of care Yes   Clinical Impression Comments Left knee pain, osteoarthritis, hip weakness, limited ROM.    Education Assessment   Preferred Learning Style Listening;Reading;Demonstration   Barriers to Learning No barriers   ORTHO GOALS   PT Ortho Eval Goals 1;2;3   Ortho Goal 1   Goal Identifier Pain   Goal Description Benjamín will use strategies learned in P.T. to decrease pain levels by 50%   Target Date 05/31/19   Ortho Goal 2   Goal Identifier ROM   Goal Description Benjamín will demonstrate left knee ROM equal to the right, allowing for squatting, stairs, biking etc.    Target Date  06/30/19   Ortho Goal 3   Goal Identifier Function   Goal Description Benjamín will improve his LEFS by 50% indicating improved overall function.   Target Date 07/30/19   Total Evaluation Time   PT Lauren, Moderate Complexity Minutes (25766) 30

## 2019-05-06 LAB — PAIN DRUG SCR UR W RPTD MEDS: NORMAL

## 2019-05-07 ENCOUNTER — MYC MEDICAL ADVICE (OUTPATIENT)
Dept: FAMILY MEDICINE | Facility: OTHER | Age: 59
End: 2019-05-07

## 2019-06-28 ENCOUNTER — TELEPHONE (OUTPATIENT)
Dept: FAMILY MEDICINE | Facility: OTHER | Age: 59
End: 2019-06-28

## 2019-06-28 NOTE — TELEPHONE ENCOUNTER
Reason for Call:  Other prescription    Detailed comments: please call [facility] at [phone]. Reference number [id number]. They would like to verify the quantity of Marks Percocet script. They also wanted you to know that he was just given Tramadol by  Friday.  Can we leave a detailed message on this number? NO    Call taken on 6/28/2019 at 8:23 AM by Claire Noyola

## 2019-06-28 NOTE — TELEPHONE ENCOUNTER
Will have NP review this, not sure if she is aware patient is getting pain medication from other providers.     Derick Bruce,

## 2019-07-01 NOTE — TELEPHONE ENCOUNTER
Please call- max daily dosage is 1 pill, I expect this prescription  to last up to  1 year  Aleida Larson PA-C

## 2019-07-01 NOTE — TELEPHONE ENCOUNTER
[place]  reviewed, getting 180 tramadol from  Friday at [facility].  Aleida Larson PA-C

## 2019-07-01 NOTE — TELEPHONE ENCOUNTER
[phone]   Reference number#[id number]    [facility] calling, they would like to know the max daily dose and how long this prescription should last the patient, is this a 30 day supply?    Derick Bruce,

## 2019-08-01 ENCOUNTER — MYC MEDICAL ADVICE (OUTPATIENT)
Dept: ORTHOPEDICS | Facility: OTHER | Age: 59
End: 2019-08-01

## 2019-08-01 NOTE — TELEPHONE ENCOUNTER
Forwarded to team to decide if he can be worked in sooner, sent message informing patient.     Deirdre CORADO RN. . .  8/1/2019, 3:10 PM

## 2019-08-01 NOTE — TELEPHONE ENCOUNTER
I called and offered him Wed 8/7/2019 but he said he would wait until Thursday.  I did tell him he could call tomorrow and see if we have an cancellations.    Mireille/BRYAN

## 2019-08-08 ENCOUNTER — OFFICE VISIT (OUTPATIENT)
Dept: ORTHOPEDICS | Facility: OTHER | Age: 59
End: 2019-08-08
Payer: COMMERCIAL

## 2019-08-08 VITALS
BODY MASS INDEX: 27.06 KG/M2 | HEIGHT: 70 IN | WEIGHT: 189 LBS | DIASTOLIC BLOOD PRESSURE: 80 MMHG | SYSTOLIC BLOOD PRESSURE: 130 MMHG

## 2019-08-08 DIAGNOSIS — M17.12 PRIMARY OSTEOARTHRITIS OF LEFT KNEE: Primary | ICD-10-CM

## 2019-08-08 PROCEDURE — 20610 DRAIN/INJ JOINT/BURSA W/O US: CPT | Mod: LT | Performed by: ORTHOPAEDIC SURGERY

## 2019-08-08 RX ORDER — TRIAMCINOLONE ACETONIDE 40 MG/ML
40 INJECTION, SUSPENSION INTRA-ARTICULAR; INTRAMUSCULAR ONCE
Status: COMPLETED | OUTPATIENT
Start: 2019-08-08 | End: 2019-08-08

## 2019-08-08 RX ADMIN — TRIAMCINOLONE ACETONIDE 40 MG: 40 INJECTION, SUSPENSION INTRA-ARTICULAR; INTRAMUSCULAR at 15:02

## 2019-08-08 ASSESSMENT — PAIN SCALES - GENERAL: PAINLEVEL: MILD PAIN (2)

## 2019-08-08 ASSESSMENT — MIFFLIN-ST. JEOR: SCORE: 1688.55

## 2019-08-08 NOTE — LETTER
8/8/2019         RE: Benjamín Mendoza  [address] [facility] [address]        Dear Colleague,    Thank you for referring your patient, Benjamín Mendoza, to the [facility]. Please see a copy of my visit note below.    Clinic Administered Medication Documentation      Injection Documentation     Injection was administered by provider (please see MAR for given by information). Please see MAR and medication order for additional information.     Site: Joint injection   Medication Used: Kenalog 40mg   Exp: 2/2021    The following medication was given by Chester Sosa, APRN, CNP, DNP:     MEDICATION: Kenalog 40mg/1ml  ROUTE: Joint Injection  SITE: LEFT KNEE  DOSE: 1 mL  LOT #: [id number]  : [employer]  EXPIRATION DATE:  2/2021  NDC: [address]-6                            Office Visit-Follow up    Chief Complaint: Benjamín Mendoza is a 59 year old male who is being seen for   Chief Complaint   Patient presents with     RECHECK     left knee follow up     Surgical Followup     DOS:6/8/2018~Left knee arthroscopy with arthroscopic partial medial meniscectomy~1 year and 2 months postop       History of Present Illness:   Today's visit:  Returns for his left knee.  Pain along the medial side.  Previous injection has provided him relief.  He is been taken Tylenol ibuprofen for other issues.  He reports for a few weeks it was extremely flared up and he called for an appointment.  By the time he is able to get in the pain has improved.  Continues to have some discomfort medially.  Also complains of stiffness.  April 17, 2019 visit:    Returns for left medial knee pain.  Different pain than in the past.  Rates it is moderate to severe at times.  Describes it as aching and sharp.  Nonradiating.  No new injuries.  On his last visit he received an intra-articular injection which provided a little less than 6 weeks relief.  He is been taken ibuprofen.  He presents with his wife.     January 2,  "2018 visit:  Returns for continued left medial knee achiness.  He reports the sharp pain from the meniscus tear preoperatively is gone.  Now more of a gradual achy soreness.  It is never significantly improved since his last visit.  It has actually worsened some.  Ibuprofen has not been helpful.  He rates it as mild to moderate at times.  It does cause him to limp at times.         REVIEW OF SYSTEMS  General: negative for, night sweats, dizziness, fatigue  Resp: No shortness of breath and no cough  CV: negative for chest pain, syncope or near-syncope  GI: negative for nausea, vomiting and diarrhea  : negative for dysuria and hematuria  Musculoskeletal: as above  Neurologic: negative for syncope   Hematologic: negative for bleeding disorder    Physical Exam:  Vitals: /80   Ht 1.778 m (5' 10\")   Wt 85.7 kg (189 lb)   BMI 27.12 kg/m     BMI= Body mass index is 27.12 kg/m .  Constitutional: healthy, alert and no acute distress   Psychiatric: mentation appears normal and affect normal/bright  NEURO: no focal deficits  RESP: Normal with easy respirations and no use of accessory muscles to breathe, no audible wheezing or retractions  CV: LLE:  no edema         SKIN: No erythema, rashes, excoriation, or breakdown. No evidence of infection.   JOINT/EXTREMITIES:left knee: Full active motion.  No effusion.  There is some tenderness along the medial joint line.  No instability with varus and valgus stressing.  Patella tracks midline.  GAIT: not tested             Diagnostic Modalities:  None today.  Independent visualization of the images was performed.      Impression: left knee primary osteoarthritis    Plan:  All of the above pertinent physical exam and imaging modalities findings was reviewed with Benjamín.                                          INJECTION PROCEDURE:  The patient was counseled about an  injection, including discussion of risks (including infection), contents of the injection, rationale for " performing the injection, and expected benefits of the injection. The skin was prepped with alcohol and betadine and then utilizing sterile technique an injection of the left knee joint from the anterolateral approach in the seated position was performed. The injection consisted 1ml of Kenalog (40mg per 1 ml) mixed with 3ml of 0.5% Marcaine. The patient tolerated the injection well, and there were no complications. The injection site was covered with a Band-Aid. The injection was performed by Randell Sosa, APRN, CNP, DNP    Options reviewed.  At this point his knee is actually feeling better than when he made the appointment.  However continues with some discomfort and stiffness.  Worse when he is been seated that does cause him limp.  We discussed attempting the injection.      Return to clinic 3, month(s), PRN, or sooner as needed for changes.  Re-x-ray on return: No    Richard Blanco D.O.          Again, thank you for allowing me to participate in the care of your patient.        Sincerely,        Dane Blanco, DO

## 2019-08-08 NOTE — PROGRESS NOTES
Clinic Administered Medication Documentation      Injection Documentation     Injection was administered by provider (please see MAR for given by information). Please see MAR and medication order for additional information.     Site: Joint injection   Medication Used: Kenalog 40mg   Exp: 2/2021    The following medication was given by MYRTLE Noble, CNP, DNP:     MEDICATION: Kenalog 40mg/1ml  ROUTE: Joint Injection  SITE: LEFT KNEE  DOSE: 1 mL  LOT #: [id number]  : [employer]  EXPIRATION DATE:  2/2021  NDC: [address]-2

## 2019-08-08 NOTE — PROGRESS NOTES
"Office Visit-Follow up    Chief Complaint: Benjamín Mendoza is a 59 year old male who is being seen for   Chief Complaint   Patient presents with     RECHECK     left knee follow up     Surgical Followup     DOS:6/8/2018~Left knee arthroscopy with arthroscopic partial medial meniscectomy~1 year and 2 months postop       History of Present Illness:   Today's visit:  Returns for his left knee.  Pain along the medial side.  Previous injection has provided him relief.  He is been taken Tylenol ibuprofen for other issues.  He reports for a few weeks it was extremely flared up and he called for an appointment.  By the time he is able to get in the pain has improved.  Continues to have some discomfort medially.  Also complains of stiffness.  April 17, 2019 visit:    Returns for left medial knee pain.  Different pain than in the past.  Rates it is moderate to severe at times.  Describes it as aching and sharp.  Nonradiating.  No new injuries.  On his last visit he received an intra-articular injection which provided a little less than 6 weeks relief.  He is been taken ibuprofen.  He presents with his wife.     January 2, 2018 visit:  Returns for continued left medial knee achiness.  He reports the sharp pain from the meniscus tear preoperatively is gone.  Now more of a gradual achy soreness.  It is never significantly improved since his last visit.  It has actually worsened some.  Ibuprofen has not been helpful.  He rates it as mild to moderate at times.  It does cause him to limp at times.         REVIEW OF SYSTEMS  General: negative for, night sweats, dizziness, fatigue  Resp: No shortness of breath and no cough  CV: negative for chest pain, syncope or near-syncope  GI: negative for nausea, vomiting and diarrhea  : negative for dysuria and hematuria  Musculoskeletal: as above  Neurologic: negative for syncope   Hematologic: negative for bleeding disorder    Physical Exam:  Vitals: /80   Ht 1.778 m (5' 10\")   Wt 85.7 " kg (189 lb)   BMI 27.12 kg/m    BMI= Body mass index is 27.12 kg/m .  Constitutional: healthy, alert and no acute distress   Psychiatric: mentation appears normal and affect normal/bright  NEURO: no focal deficits  RESP: Normal with easy respirations and no use of accessory muscles to breathe, no audible wheezing or retractions  CV: LLE:  no edema         SKIN: No erythema, rashes, excoriation, or breakdown. No evidence of infection.   JOINT/EXTREMITIES:left knee: Full active motion.  No effusion.  There is some tenderness along the medial joint line.  No instability with varus and valgus stressing.  Patella tracks midline.  GAIT: not tested             Diagnostic Modalities:  None today.  Independent visualization of the images was performed.      Impression: left knee primary osteoarthritis    Plan:  All of the above pertinent physical exam and imaging modalities findings was reviewed with Benjamín.                                          INJECTION PROCEDURE:  The patient was counseled about an  injection, including discussion of risks (including infection), contents of the injection, rationale for performing the injection, and expected benefits of the injection. The skin was prepped with alcohol and betadine and then utilizing sterile technique an injection of the left knee joint from the anterolateral approach in the seated position was performed. The injection consisted 1ml of Kenalog (40mg per 1 ml) mixed with 3ml of 0.5% Marcaine. The patient tolerated the injection well, and there were no complications. The injection site was covered with a Band-Aid. The injection was performed by Radnell Sosa, APRN, CNP, DNP    Options reviewed.  At this point his knee is actually feeling better than when he made the appointment.  However continues with some discomfort and stiffness.  Worse when he is been seated that does cause him limp.  We discussed attempting the injection.      Return to clinic 3, month(s), PRN, or  sooner as needed for changes.  Re-x-ray on return: No    Richard Blanco D.O.

## 2019-08-09 ENCOUNTER — TELEPHONE (OUTPATIENT)
Dept: FAMILY MEDICINE | Facility: OTHER | Age: 59
End: 2019-08-09

## 2019-08-09 ENCOUNTER — OFFICE VISIT (OUTPATIENT)
Dept: FAMILY MEDICINE | Facility: CLINIC | Age: 59
End: 2019-08-09
Payer: COMMERCIAL

## 2019-08-09 VITALS
HEART RATE: 68 BPM | BODY MASS INDEX: 27.63 KG/M2 | WEIGHT: 193 LBS | SYSTOLIC BLOOD PRESSURE: 130 MMHG | DIASTOLIC BLOOD PRESSURE: 80 MMHG | RESPIRATION RATE: 16 BRPM | TEMPERATURE: 99 F | OXYGEN SATURATION: 98 % | HEIGHT: 70 IN

## 2019-08-09 DIAGNOSIS — M54.12 CERVICAL RADICULOPATHY: Primary | ICD-10-CM

## 2019-08-09 PROCEDURE — 99214 OFFICE O/P EST MOD 30 MIN: CPT | Performed by: NURSE PRACTITIONER

## 2019-08-09 RX ORDER — METHYLPREDNISOLONE 4 MG
TABLET, DOSE PACK ORAL
Qty: 21 TABLET | Refills: 0 | Status: SHIPPED | OUTPATIENT
Start: 2019-08-09 | End: 2019-10-09

## 2019-08-09 RX ORDER — TRAMADOL HYDROCHLORIDE 50 MG/1
TABLET ORAL
COMMUNITY
Start: 2019-06-11 | End: 2020-07-28

## 2019-08-09 ASSESSMENT — MIFFLIN-ST. JEOR: SCORE: 1706.69

## 2019-08-09 ASSESSMENT — PAIN SCALES - GENERAL: PAINLEVEL: SEVERE PAIN (6)

## 2019-08-09 NOTE — TELEPHONE ENCOUNTER
Benjamín Mendoza is a 59 year old male who calls with Left arm.    NURSING ASSESSMENT:  Description:  I spoke with the pt who states he went to the chiropractor Monnday and left arm and thumb went numb.  Pt went back and saw Chiropractor again twice on Thursday. States it is very painful when he is adjusted.  Onset/duration:  Monday  Associated symptoms:  No Chestpain    Allergies:   Allergies   Allergen Reactions     Sumatriptan Nausea and Vomiting     Migraine medications     RECOMMENDED DISPOSITION:  OK for OV  Will comply with recommendation: Yes  If further questions/concerns or if symptoms do not improve, worsen or new symptoms develop, call your PCP or [facility] as soon as possible.      Guideline used:  Telephone Triage Protocols for Nurses, Fifth Edition, Odessa Espitia RN

## 2019-08-09 NOTE — PATIENT INSTRUCTIONS
Take medrol dose pack as prescribed.  Please follow-up in 1-2 weeks for re-evaluation, possible imaging, or PT Referral.    Please call or return to clinic for any questions, concerns, or symptoms that are not improving or becoming worse.    Eli Prasad, CNP

## 2019-08-09 NOTE — PROGRESS NOTES
"Subjective     Benjamín Mendoza is a 59 year old male who presents to clinic today for the following health issues:    HPI    Left Shoulder Pain    Onset: 2 weeks then went to the chiropractor on Monday and it got extremely painful and then the numbness and tingling started happening in his arm. Now his left thumb is completely numb.    Description:   Location: left shoulder down to thumb  Character: Sharp, Burning and aching    Intensity: moderate    Progression of Symptoms: same as Monday    Accompanying Signs & Symptoms:  Other symptoms: radiation of pain to arm, numbness, tingling and thumb has been turning color    History:   Previous similar pain: no    Precipitating factors:   Trauma or overuse: YES- chiropractor on Monday    Alleviating factors:  Improved by: nothing    Additional HPI:   - HPI as above.  He went to the chiropractor on Monday with what he thought was a \"knot\" in his left shoulder  - He was adjusted, and felt left shoulder pain as soon as he got home, \"8/10\"  - He states he has not slept for 4 nights  - Has been using a heating pad  - He has been taking Percocet for pain control without relief  - Denies injury or trauma to his shoulder     Therapies Tried and outcome: ibuprofen and chiropractor          Patient Active Problem List   Diagnosis     Headache     CARY (obstructive sleep apnea)     Tinnitus of both ears     Insomnia, unspecified type     Chronic midline low back pain without sciatica     Chronic pain syndrome     Advanced directives, counseling/discussion     Chondromalacia, knee, left     Other tear of medial meniscus of left knee as current injury, initial encounter     Gastroesophageal reflux disease without esophagitis     Primary osteoarthritis of left knee     Past Surgical History:   Procedure Laterality Date     ARTHROSCOPY KNEE WITH MEDIAL MENISCECTOMY Left 6/8/2018    Procedure: ARTHROSCOPY KNEE WITH MEDIAL MENISCECTOMY;  Left knee arthroscopy with arthroscopic partial " meniscectomy;  Surgeon: Dane Blanco DO;  [facility]: [facility]     COLONOSCOPY  07     COLONOSCOPY N/A 2017    Procedure: COLONOSCOPY;  colonoscopy;  Surgeon: Lake Mueller MD;  Location: [facility]     HC TRABECULOPLASTY BY LASER SURGERY      LASIK     SEPTOPLASTY, TURBINOPLASTY, COMBINED N/A 2016    Procedure: COMBINED SEPTOPLASTY, TURBINOPLASTY;  Surgeon: Ken Molina MD;  Location: [facility]     [facility]         Social History     Tobacco Use     Smoking status: Never Smoker     Smokeless tobacco: Never Used     Tobacco comment: no smokers in household   Substance Use Topics     Alcohol use: No     Family History   Problem Relation Age of Onset     Cerebrovascular Disease Mother 66        aneursym -  at 66     Other - See Comments Sister         Half sister, heart problems unsure what kind     Other - See Comments Brother         Was in a fire     No Known Problems Maternal Grandmother      No Known Problems Maternal Grandfather      Arthritis Maternal Aunt      Arthritis Maternal Uncle      Muscular Disorder Daughter 18        Small fiber neuropathy     Unknown/Adopted Father      Unknown/Adopted Paternal Grandmother      Unknown/Adopted Paternal Grandfather      Hypertension No family hx of      Diabetes No family hx of          Current Outpatient Medications   Medication Sig Dispense Refill     atorvastatin (LIPITOR) 40 MG tablet Take 1 tablet (40 mg) by mouth daily 90 tablet 0     HORIZANT 600 MG tablet Take 600 mg by mouth At Bedtime       methylPREDNISolone (MEDROL DOSEPAK) 4 MG tablet therapy pack Follow Package Directions 21 tablet 0     Multiple Vitamin (ONE-A-DAY MENS PO) Take 1 tablet by mouth every evening        omeprazole (PRILOSEC) 20 MG DR capsule Take 1 capsule (20 mg) by mouth daily 90 capsule 3     oxyCODONE-acetaminophen (PERCOCET) 5-325 MG tablet Take 1-2 tablets by mouth every 4 hours as needed for pain (moderate to severe) 90 tablet 0      "traMADol (ULTRAM) 50 MG tablet        naloxone (NARCAN) 4 MG/0.1ML nasal spray Spray 1 spray (4 mg) into one nostril alternating nostrils once as needed for opioid reversal every 2-3 minutes until assistance arrives (Patient not taking: Reported on 8/9/2019) 0.2 mL 0     Allergies   Allergen Reactions     Sumatriptan Nausea and Vomiting     Migraine medications     Recent Labs   Lab Test 05/29/18  1501 04/04/17  0845 04/04/17  0516 04/03/17  1943 04/03/17  0140 10/14/15  1407   A1C  --   --   --   --  5.3  --    LDL 59  --  157*  --   --  141*   HDL 36*  --  38*  --   --  37*   TRIG 194*  --  127  --   --  195*   ALT 33  --   --   --   --  39   CR  --   --   --  0.86  --  0.68   GFRESTIMATED  --   --   --  >90  Non  GFR Calc    --  >90  Non  GFR Calc     GFRESTBLACK  --   --   --  >90  African American GFR Calc    --  >90   GFR Calc     POTASSIUM  --   --   --  3.7  --  3.7   TSH  --  1.93  --   --   --  2.13      BP Readings from Last 3 Encounters:   08/09/19 130/80   08/08/19 130/80   05/01/19 138/76    Wt Readings from Last 3 Encounters:   08/09/19 87.5 kg (193 lb)   08/08/19 85.7 kg (189 lb)   05/01/19 85.7 kg (189 lb)                    Reviewed and updated as needed this visit by Provider         Review of Systems   ROS COMP: Constitutional, HEENT, cardiovascular, pulmonary, gi and gu systems are negative, except as otherwise noted.      Objective    /80   Pulse 68   Temp 99  F (37.2  C) (Temporal)   Resp 16   Ht 1.778 m (5' 10\")   Wt 87.5 kg (193 lb)   SpO2 98%   BMI 27.69 kg/m    Body mass index is 27.69 kg/m .  Physical Exam   GENERAL: healthy, alert and no distress  MS: normal muscle tone, normal range of motion, no edema, peripheral pulses normal and tenderness to palpation medial to left scapula, no erythema or ecchymosis  SKIN: no suspicious lesions or rashes  NEURO: Normal strength and tone, mentation intact and speech normal  Comprehensive " "back pain exam:  Tenderness of left mid-back, Range of motion not limited by pain, Lower extremity strength functional and equal on both sides, Lower extremity reflexes within normal limits bilaterally, Lower extremity sensation normal and equal on both sides and Straight leg raise negative bilaterally  PSYCH: mentation appears normal, affect normal/bright    Diagnostic Test Results:  Labs reviewed in Epic  none         Assessment & Plan     1. Cervical radiculopathy  HPI as above.  Patient has full ROM of left shoulder.  Area of pain on exam is localized medially to left scapula.  Pain seems out of proportion to exam and he has been taking Percocet as he has chronic pain syndrome and chronic midline low back pain without sciatica.  Suspect cervical radiculopathy with numbness and tingling down left arm.  We discussed conservative therapy is usually the first-line treatment for this with anti-inflammatory medication or a short course of oral prednisone.  I advised him to follow-up with his PCP if pain is still persisting after 1-2 weeks.  He may need imaging and/or PT if conservative treatment is not effective.    - methylPREDNISolone (MEDROL DOSEPAK) 4 MG tablet therapy pack; Follow Package Directions  Dispense: 21 tablet; Refill: 0     BMI:   Estimated body mass index is 27.69 kg/m  as calculated from the following:    Height as of this encounter: 1.778 m (5' 10\").    Weight as of this encounter: 87.5 kg (193 lb).     The patient was instructed to contact clinic for non-improvement as expected/discussed, worsening symptoms, and for questions regarding medications or treatment plan. All questions were answered to the best of my ability and the patient's satisfaction.         Patient Instructions   Take medrol dose pack as prescribed.  Please follow-up in 1-2 weeks for re-evaluation, possible imaging, or PT Referral.    Please call or return to clinic for any questions, concerns, or symptoms that are not improving " or becoming worse.    Eli Prasad CNP        Return in 3 weeks (on 8/30/2019) for Symptoms Not Improving/Getting Worse.    Eli Prasad CNP  [facility]

## 2019-08-09 NOTE — TELEPHONE ENCOUNTER
Reason for call:  Patient reporting a symptom    Symptom or request: Left shoulder pain, numb left arm and thumb    Duration (how long have symptoms been present):     Have you been treated for this before? No    Additional comments: I found this on the schedule when I was rescheduling his appt today. Please triage    Phone Number patient can be reached at:  Home number on file [phone] (home)    Best Time:  any    Can we leave a detailed message on this number:  YES    Call taken on 8/9/2019 at 7:36 AM by Taina Umanzor

## 2019-08-15 ENCOUNTER — TELEPHONE (OUTPATIENT)
Dept: FAMILY MEDICINE | Facility: CLINIC | Age: 59
End: 2019-08-15

## 2019-08-15 DIAGNOSIS — M54.12 CERVICAL RADICULOPATHY: Primary | ICD-10-CM

## 2019-08-15 NOTE — TELEPHONE ENCOUNTER
Can you please call patient:     I put in a physical therapy referral for him, so someone should be reaching out to him within a couple of days.  I do not recommend another steroid at this time.        Thank you.  Eli Prasad, CNP

## 2019-08-15 NOTE — TELEPHONE ENCOUNTER
Reason for Call:  Other Follow up    Detailed comments: Patient calling back. He is symptoms came back as soon as the steroid RX were done. He is wondering what to do next.     Phone Number Patient can be reached at: Home number on file [phone] (home)    Best Time: any    Can we leave a detailed message on this number? YES    Call taken on 8/15/2019 at 7:59 AM by Beatrice Flores

## 2019-08-20 DIAGNOSIS — E78.5 HYPERLIPIDEMIA, UNSPECIFIED HYPERLIPIDEMIA TYPE: ICD-10-CM

## 2019-08-21 NOTE — TELEPHONE ENCOUNTER
Pending Prescriptions:                       Disp   Refills    atorvastatin (LIPITOR) 40 MG tablet [Phar*90 tab*4            Sig: TAKE 1 TABLET DAILY    Routing refill request to provider for review/approval because:  Labs not current:  Lipids    Diane Espitia RN, BSN

## 2019-08-22 RX ORDER — ATORVASTATIN CALCIUM 40 MG/1
TABLET, FILM COATED ORAL
Qty: 90 TABLET | Refills: 0 | Status: SHIPPED | OUTPATIENT
Start: 2019-08-22 | End: 2019-11-21

## 2019-08-27 DIAGNOSIS — E83.10 DISORDER OF IRON METABOLISM: ICD-10-CM

## 2019-08-27 DIAGNOSIS — G25.81 RESTLESS LEG SYNDROME: Primary | ICD-10-CM

## 2019-08-27 DIAGNOSIS — E78.5 HYPERLIPIDEMIA, UNSPECIFIED HYPERLIPIDEMIA TYPE: ICD-10-CM

## 2019-08-27 LAB
ALBUMIN SERPL-MCNC: 4.3 G/DL (ref 3.4–5)
ALP SERPL-CCNC: 64 U/L (ref 40–150)
ALT SERPL W P-5'-P-CCNC: 37 U/L (ref 0–70)
ANION GAP SERPL CALCULATED.3IONS-SCNC: 10 MMOL/L (ref 3–14)
AST SERPL W P-5'-P-CCNC: 23 U/L (ref 0–45)
BILIRUB SERPL-MCNC: 1.1 MG/DL (ref 0.2–1.3)
BUN SERPL-MCNC: 18 MG/DL (ref 7–30)
CALCIUM SERPL-MCNC: 8.8 MG/DL (ref 8.5–10.1)
CHLORIDE SERPL-SCNC: 106 MMOL/L (ref 94–109)
CHOLEST SERPL-MCNC: 193 MG/DL
CO2 SERPL-SCNC: 24 MMOL/L (ref 20–32)
CREAT SERPL-MCNC: 0.83 MG/DL (ref 0.66–1.25)
FERRITIN SERPL-MCNC: 90 NG/ML (ref 26–388)
GFR SERPL CREATININE-BSD FRML MDRD: >90 ML/MIN/{1.73_M2}
GLUCOSE SERPL-MCNC: 88 MG/DL (ref 70–99)
HDLC SERPL-MCNC: 54 MG/DL
IRON SATN MFR SERPL: 31 % (ref 15–46)
IRON SERPL-MCNC: 116 UG/DL (ref 35–180)
LDLC SERPL CALC-MCNC: 114 MG/DL
NONHDLC SERPL-MCNC: 139 MG/DL
POTASSIUM SERPL-SCNC: 3.8 MMOL/L (ref 3.4–5.3)
PROT SERPL-MCNC: 7.5 G/DL (ref 6.8–8.8)
SODIUM SERPL-SCNC: 140 MMOL/L (ref 133–144)
TIBC SERPL-MCNC: 379 UG/DL (ref 240–430)
TRIGL SERPL-MCNC: 123 MG/DL

## 2019-08-27 PROCEDURE — 36415 COLL VENOUS BLD VENIPUNCTURE: CPT | Performed by: PHYSICIAN ASSISTANT

## 2019-08-27 PROCEDURE — 83540 ASSAY OF IRON: CPT

## 2019-08-27 PROCEDURE — 83550 IRON BINDING TEST: CPT

## 2019-08-27 PROCEDURE — 80061 LIPID PANEL: CPT | Performed by: PHYSICIAN ASSISTANT

## 2019-08-27 PROCEDURE — 82728 ASSAY OF FERRITIN: CPT

## 2019-08-27 PROCEDURE — 80053 COMPREHEN METABOLIC PANEL: CPT | Performed by: PHYSICIAN ASSISTANT

## 2019-08-28 ENCOUNTER — MYC MEDICAL ADVICE (OUTPATIENT)
Dept: FAMILY MEDICINE | Facility: OTHER | Age: 59
End: 2019-08-28

## 2019-08-28 NOTE — TELEPHONE ENCOUNTER
"Please review/advise. Per result note today: \"Written by Aleida Larson PA-C on 8/28/2019  7:25 AM   Good morning,  your kidney functions, liver functions and electrolytes are normal. Your blood sugar was normal as well.   Your cholesterol is showing that your triglycerides and HDL (good cholesterol) are normal.  The LDL or bad cholesterol is 114, this is a bit higher than recommended.  Please let me know if you have been using the atorvastatin as prescribed.       Aleida Larson PA-C \"  "

## 2019-08-28 NOTE — TELEPHONE ENCOUNTER
Please check with lab to see if they faxed these results to viv[facility]ado Friday?  Aleida Larson PA-C

## 2019-08-28 NOTE — TELEPHONE ENCOUNTER
Bill Sanchez in lab results were faxed. Will close encounter.    Robert Buchanan MA on 8/28/2019 at 11:13 AM

## 2019-09-04 ENCOUNTER — MEDICAL CORRESPONDENCE (OUTPATIENT)
Dept: HEALTH INFORMATION MANAGEMENT | Facility: CLINIC | Age: 59
End: 2019-09-04

## 2019-09-04 ENCOUNTER — TELEPHONE (OUTPATIENT)
Dept: FAMILY MEDICINE | Facility: CLINIC | Age: 59
End: 2019-09-04

## 2019-09-04 NOTE — TELEPHONE ENCOUNTER
Reason for Call:  Form, our goal is to have forms completed with 72 hours, however, some forms may require a visit or additional information.    Type of letter, form or note:  Physical Therapy    Who is the form from?: [facility] (if other please explain)    Where did the form come from: form was faxed in    What clinic location was the form placed at?: [facility] - [phone]    Where the form was placed: [employer]    What number is listed as a contact on the form?: [phone]       Additional comments: Please sign referral and fax back to [phone]    Call taken on 9/4/2019 at 7:19 AM by Beatrice Flores

## 2019-09-11 ENCOUNTER — TRANSFERRED RECORDS (OUTPATIENT)
Dept: HEALTH INFORMATION MANAGEMENT | Facility: CLINIC | Age: 59
End: 2019-09-11

## 2019-09-11 ENCOUNTER — TELEPHONE (OUTPATIENT)
Dept: FAMILY MEDICINE | Facility: OTHER | Age: 59
End: 2019-09-11

## 2019-09-11 NOTE — TELEPHONE ENCOUNTER
Reason for call:  Form  Reason for Call:  Form, our goal is to have forms completed with 72 hours, however, some forms may require a visit or additional information.    Type of letter, form or note:  Medical    Who is the form from?: [employer]      Where did the form come from: form was faxed in    What clinic location was the form placed at?: [facility] - [phone]    Where the form was placed: 's in box     What number is listed as a contact on the form?: [phone]       Additional comments: Fax back to [phone]    Call taken on 9/11/2019 at 11:48 AM by Dl Bravo

## 2019-09-18 ENCOUNTER — TRANSFERRED RECORDS (OUTPATIENT)
Dept: HEALTH INFORMATION MANAGEMENT | Facility: CLINIC | Age: 59
End: 2019-09-18

## 2019-09-20 ENCOUNTER — TELEPHONE (OUTPATIENT)
Dept: FAMILY MEDICINE | Facility: OTHER | Age: 59
End: 2019-09-20

## 2019-09-20 NOTE — TELEPHONE ENCOUNTER
Reason for call:  Form  Reason for Call:  Form, our goal is to have forms completed with 72 hours, however, some forms may require a visit or additional information.    Type of letter, form or note:  Medical    Who is the form from?: [facility]      Where did the form come from: form was faxed in    What clinic location was the form placed at?: [facility] - [phone]    Where the form was placed: 's in box     What number is listed as a contact on the form?: [phone]       Additional comments: Fax back to [phone]    Call taken on 9/20/2019 at 1:25 PM by Dl Bravo

## 2019-10-08 NOTE — PROGRESS NOTES
"Subjective     Benjamín Mendoza is a 59 year old male who presents to clinic today for the following health issues:    History of Present Illness        He eats 0-1 servings of fruits and vegetables daily.He consumes 1 sweetened beverage(s) daily.  He is taking medications regularly.       Joint Pain    Onset: over two months    Description:   Location: left shoulder, neck, arm left thumb   Character: Sharp, Dull ache, Stabbing, Burning and Cramping    Intensity: moderate    Progression of Symptoms:\" 40% better\"    Accompanying Signs & Symptoms:  Other symptoms: numbness, tingling, warmth, swelling     History:   Previous similar pain: no       Precipitating factors:   Trauma or overuse: YES- \"the chiropractor\"     Alleviating factors:  Improved by:none     Therapies Tried and outcome: physical therapy, ibuprofen( take every 4-6 hours, 1000 mg)     Additional HPI:  Benjamín is a 59-year old male who presents with neck pain with radicular symptoms x 2 months.  He has a history of chronic pain syndrome, CARY, GERD, osteoarthritis, insomnia, and hyperlipidemia. He was originally seen on 8/9/19, prescribed a Medrol Dose Pack, and physical therapy referral.  He has been consistent in doing his PT, but reports only about 25% improvement in pain.  He also is using Ibuprofen and ice packs, as heat aggravates his pain.  He reports a weak hand  and dropping items from his left hand due to numbness and tingling down his left extremity.  Denies any previous trauma/injury to his neck or left shoulder.  He is also reporting left ear fullness today - \"like water is stuck in my ear\".        Patient Active Problem List   Diagnosis     Headache     CARY (obstructive sleep apnea)     Tinnitus of both ears     Insomnia, unspecified type     Chronic midline low back pain without sciatica     Chronic pain syndrome     Advanced directives, counseling/discussion     Chondromalacia, knee, left     Other tear of medial meniscus of left knee as " current injury, initial encounter     Gastroesophageal reflux disease without esophagitis     Primary osteoarthritis of left knee     Past Surgical History:   Procedure Laterality Date     ARTHROSCOPY KNEE WITH MEDIAL MENISCECTOMY Left 2018    Procedure: ARTHROSCOPY KNEE WITH MEDIAL MENISCECTOMY;  Left knee arthroscopy with arthroscopic partial meniscectomy;  Surgeon: Dane Blanco DO;  [facility]     COLONOSCOPY  07     COLONOSCOPY N/A 2017    Procedure: COLONOSCOPY;  colonoscopy;  Surgeon: Lake Mueller MD;  Location: [facility]     HC TRABECULOPLASTY BY LASER SURGERY      LASIK     SEPTOPLASTY, TURBINOPLASTY, COMBINED N/A 2016    Procedure: COMBINED SEPTOPLASTY, TURBINOPLASTY;  Surgeon: Ken Molina MD;  [facility]         Social History     Tobacco Use     Smoking status: Never Smoker     Smokeless tobacco: Never Used     Tobacco comment: no smokers in household   Substance Use Topics     Alcohol use: No     Family History   Problem Relation Age of Onset     Cerebrovascular Disease Mother 66        aneursym -  at 66     Other - See Comments Sister         Half sister, heart problems unsure what kind     Other - See Comments Brother         Was in a fire     No Known Problems Maternal Grandmother      No Known Problems Maternal Grandfather      Arthritis Maternal Aunt      Arthritis Maternal Uncle      Muscular Disorder Daughter 18        Small fiber neuropathy     Unknown/Adopted Father      Unknown/Adopted Paternal Grandmother      Unknown/Adopted Paternal Grandfather      Hypertension No family hx of      Diabetes No family hx of          Current Outpatient Medications   Medication Sig Dispense Refill     atorvastatin (LIPITOR) 40 MG tablet TAKE 1 TABLET DAILY 90 tablet 0     HORIZANT 600 MG tablet Take 600 mg by mouth At Bedtime       Multiple Vitamin (ONE-A-DAY MENS PO) Take 1 tablet by mouth every evening        omeprazole  "(PRILOSEC) 20 MG DR capsule Take 1 capsule (20 mg) by mouth daily 90 capsule 3     oxyCODONE-acetaminophen (PERCOCET) 5-325 MG tablet Take 1-2 tablets by mouth every 4 hours as needed for pain (moderate to severe) 90 tablet 0     predniSONE (DELTASONE) 20 MG tablet Take 1 tablet (20 mg) by mouth daily for 5 days 5 tablet 0     traMADol (ULTRAM) 50 MG tablet        naloxone (NARCAN) 4 MG/0.1ML nasal spray Spray 1 spray (4 mg) into one nostril alternating nostrils once as needed for opioid reversal every 2-3 minutes until assistance arrives (Patient not taking: Reported on 8/9/2019) 0.2 mL 0     Allergies   Allergen Reactions     Sumatriptan Nausea and Vomiting     Migraine medications     Recent Labs   Lab Test 08/27/19  1356 05/29/18  1501 04/04/17  0845 04/04/17  0516 04/03/17  1943 04/03/17  0140 10/14/15  1407   A1C  --   --   --   --   --  5.3  --    * 59  --  157*  --   --  141*   HDL 54 36*  --  38*  --   --  37*   TRIG 123 194*  --  127  --   --  195*   ALT 37 33  --   --   --   --  39   CR 0.83  --   --   --  0.86  --  0.68   GFRESTIMATED >90  --   --   --  >90  Non  GFR Calc    --  >90  Non  GFR Calc     GFRESTBLACK >90  --   --   --  >90  African American GFR Calc    --  >90   GFR Calc     POTASSIUM 3.8  --   --   --  3.7  --  3.7   TSH  --   --  1.93  --   --   --  2.13      BP Readings from Last 3 Encounters:   10/09/19 (!) 144/96   08/09/19 130/80   08/08/19 130/80    Wt Readings from Last 3 Encounters:   10/09/19 86.2 kg (190 lb)   08/09/19 87.5 kg (193 lb)   08/08/19 85.7 kg (189 lb)                    Reviewed and updated as needed this visit by Provider         Review of Systems   ROS COMP: Constitutional, HEENT, cardiovascular, pulmonary, gi and gu systems are negative, except as otherwise noted.      Objective    BP (!) 144/96   Pulse 65   Temp 98.2  F (36.8  C) (Oral)   Resp 12   Ht 1.753 m (5' 9\")   Wt 86.2 kg (190 lb)   SpO2 98%   " "BMI 28.06 kg/m    Body mass index is 28.06 kg/m .  Physical Exam   GENERAL: healthy, alert and no distress  HENT: normal cephalic/atraumatic, right ear: normal: no effusions, left ear: clear effusion,  no erythema, normal landmarks, nose and mouth without ulcers or lesions, oropharynx clear and oral mucous membranes moist  NECK: no adenopathy, no asymmetry, masses, or scars and thyroid normal to palpation  RESP: lungs clear to auscultation - no rales, rhonchi or wheezes  CV: regular rate and rhythm, normal S1 S2, no S3 or S4, no murmur, click or rub, no peripheral edema and peripheral pulses strong  ABDOMEN: soft, nontender, no hepatosplenomegaly, no masses and bowel sounds normal  MS: no gross musculoskeletal defects noted, no edema, tenderness with palpation over left scapula region, decreased range of motion with abduction and external rotation.      Diagnostic Test Results:  Labs reviewed in Epic  none         Assessment & Plan     1. Cervical radiculopathy  Benjamín has had neck pain with radicular symptoms for approximately 8 weeks now.  He has been diligent in going to physical therapy; however, reports minimal improvement.  He states the Medrol Dose Pack was effective for the first day, so prescribed the following today and ordered an MRI for further evaluation and management.  He is agreeable to this and will call to schedule.    - predniSONE (DELTASONE) 20 MG tablet; Take 1 tablet (20 mg) by mouth daily for 5 days  Dispense: 5 tablet; Refill: 0  - MR Soft Tissue Neck w/o & w Contrast    2. OME (otitis media with effusion), left  Discussed this may take some time and usually resolves within 6 weeks.  Advised watchful waiting and can also try over-the-counter Zyrtec as he has some accompanying allergy symptoms.  He voiced understanding and will follow-up as needed.         BMI:   Estimated body mass index is 28.06 kg/m  as calculated from the following:    Height as of this encounter: 1.753 m (5' 9\").    Weight " as of this encounter: 86.2 kg (190 lb).           Patient Instructions   MRI ordered today.  Please call [phone]  Start over-the-counter Zyrtec for your left ear fullness.  Please reach out to me if you are not feeling better in 1-2 weeks - you can message me via [employer].    Begin Prednisone today.    Make an appointment for your yearly physical.    Please call or return to clinic for any questions, concerns, or symptoms that are not improving or becoming worse.    Eli Prasad CNP        Return in about 1 week (around 10/16/2019) for Diagnostic Testing.    Eli Prasad CNP  [facility]

## 2019-10-09 ENCOUNTER — TRANSFERRED RECORDS (OUTPATIENT)
Dept: HEALTH INFORMATION MANAGEMENT | Facility: CLINIC | Age: 59
End: 2019-10-09

## 2019-10-09 ENCOUNTER — TELEPHONE (OUTPATIENT)
Dept: FAMILY MEDICINE | Facility: CLINIC | Age: 59
End: 2019-10-09

## 2019-10-09 ENCOUNTER — OFFICE VISIT (OUTPATIENT)
Dept: FAMILY MEDICINE | Facility: CLINIC | Age: 59
End: 2019-10-09
Payer: COMMERCIAL

## 2019-10-09 VITALS
TEMPERATURE: 98.2 F | DIASTOLIC BLOOD PRESSURE: 96 MMHG | WEIGHT: 190 LBS | HEART RATE: 65 BPM | RESPIRATION RATE: 12 BRPM | SYSTOLIC BLOOD PRESSURE: 144 MMHG | BODY MASS INDEX: 28.14 KG/M2 | OXYGEN SATURATION: 98 % | HEIGHT: 69 IN

## 2019-10-09 DIAGNOSIS — H65.92 OME (OTITIS MEDIA WITH EFFUSION), LEFT: ICD-10-CM

## 2019-10-09 DIAGNOSIS — M54.12 CERVICAL RADICULOPATHY: Primary | ICD-10-CM

## 2019-10-09 PROCEDURE — 99214 OFFICE O/P EST MOD 30 MIN: CPT | Performed by: NURSE PRACTITIONER

## 2019-10-09 RX ORDER — PREDNISONE 20 MG/1
20 TABLET ORAL DAILY
Qty: 5 TABLET | Refills: 0 | Status: SHIPPED | OUTPATIENT
Start: 2019-10-09 | End: 2019-12-18

## 2019-10-09 ASSESSMENT — MIFFLIN-ST. JEOR: SCORE: 1677.21

## 2019-10-09 ASSESSMENT — PAIN SCALES - GENERAL: PAINLEVEL: MODERATE PAIN (5)

## 2019-10-09 NOTE — PATIENT INSTRUCTIONS
MRI ordered today.  Please call [phone]  Start over-the-counter Zyrtec for your left ear fullness.  Please reach out to me if you are not feeling better in 1-2 weeks - you can message me via [employer].    Begin Prednisone today.    Make an appointment for your yearly physical.    Please call or return to clinic for any questions, concerns, or symptoms that are not improving or becoming worse.    Eli Prasad, CNP

## 2019-10-09 NOTE — TELEPHONE ENCOUNTER
Reason for call:  Form  Reason for Call:  Form, our goal is to have forms completed with 72 hours, however, some forms may require a visit or additional information.    Type of letter, form or note:  medical    Who is the form from?:  [facility]    Where did the form come from: form was faxed in    What clinic location was the form placed at?: [facility] - [phone]    Where the form was placed: 's Box    What number is listed as a contact on the form?:  [phone]       Additional comments:  Fax to [phone]    created by Zina Demarco

## 2019-10-13 ENCOUNTER — HOSPITAL ENCOUNTER (OUTPATIENT)
Dept: MRI IMAGING | Facility: CLINIC | Age: 59
Discharge: HOME | End: 2019-10-13
Attending: NURSE PRACTITIONER | Admitting: NURSE PRACTITIONER
Payer: COMMERCIAL

## 2019-10-13 PROCEDURE — 72141 MRI NECK SPINE W/O DYE: CPT

## 2019-10-14 ENCOUNTER — TELEPHONE (OUTPATIENT)
Dept: FAMILY MEDICINE | Facility: OTHER | Age: 59
End: 2019-10-14

## 2019-10-14 DIAGNOSIS — M48.02 FORAMINAL STENOSIS OF CERVICAL REGION: Primary | ICD-10-CM

## 2019-10-14 NOTE — TELEPHONE ENCOUNTER
Reason for Call:  Request for results:    Name of test or procedure: MRI    Date of test of procedure: 10-13-19    Location of the test or procedure: [facility] to leave the result message on voice mail or with a family member? YES    Phone number Patient can be reached at:  Home number on file [phone] (home)    Additional comments: call with results.    Call taken on 10/14/2019 at 1:48 PM by Taina Umanzor

## 2019-10-14 NOTE — TELEPHONE ENCOUNTER
Discussed results by phone.  Placed a neurosurgery consult for further evaluation and management.    Eli Prasad, CNP

## 2019-10-17 ENCOUNTER — MYC MEDICAL ADVICE (OUTPATIENT)
Dept: FAMILY MEDICINE | Facility: CLINIC | Age: 59
End: 2019-10-17

## 2019-11-04 ENCOUNTER — HOSPITAL ENCOUNTER (OUTPATIENT)
Facility: CLINIC | Age: 59
End: 2019-11-04
Attending: NEUROLOGICAL SURGERY | Admitting: NEUROLOGICAL SURGERY
Payer: COMMERCIAL

## 2019-11-04 ENCOUNTER — OFFICE VISIT (OUTPATIENT)
Dept: NEUROSURGERY | Facility: CLINIC | Age: 59
End: 2019-11-04
Attending: NURSE PRACTITIONER
Payer: COMMERCIAL

## 2019-11-04 VITALS
DIASTOLIC BLOOD PRESSURE: 82 MMHG | TEMPERATURE: 98 F | BODY MASS INDEX: 28.73 KG/M2 | HEART RATE: 91 BPM | SYSTOLIC BLOOD PRESSURE: 152 MMHG | HEIGHT: 69 IN | OXYGEN SATURATION: 97 % | WEIGHT: 194 LBS | RESPIRATION RATE: 16 BRPM

## 2019-11-04 DIAGNOSIS — M54.12 CERVICAL RADICULOPATHY: Primary | ICD-10-CM

## 2019-11-04 PROCEDURE — 99213 OFFICE O/P EST LOW 20 MIN: CPT | Performed by: NEUROLOGICAL SURGERY

## 2019-11-04 PROCEDURE — G0463 HOSPITAL OUTPT CLINIC VISIT: HCPCS

## 2019-11-04 ASSESSMENT — MIFFLIN-ST. JEOR: SCORE: 1695.36

## 2019-11-04 ASSESSMENT — PAIN SCALES - GENERAL: PAINLEVEL: MODERATE PAIN (4)

## 2019-11-04 NOTE — PROGRESS NOTES
I was asked by Dr. Prasad to see this patient in consultation    59M w/ severe left arm pain and weakness, left C5-6 severe foraminal stenosis.  3 months of 8/10, sharp pain from neck to biceps, forearm, and thumb.  Left biceps weakness and numbness to thenar hand.  Chiropractic care and PT without improvement.       Past Medical History:   Diagnosis Date     Chronic back pain     percocet, spinal fluid leak dx but never visible     Chronic headaches      Esophageal reflux      GERD (gastroesophageal reflux disease)      Motion sickness      CARY (obstructive sleep apnea) 3/2009    AHI 26 Ndir 84%     Primary osteoarthritis of left knee 4/17/2019     RLS (restless legs syndrome)      Past Surgical History:   Procedure Laterality Date     ARTHROSCOPY KNEE WITH MEDIAL MENISCECTOMY Left 6/8/2018    Procedure: ARTHROSCOPY KNEE WITH MEDIAL MENISCECTOMY;  Left knee arthroscopy with arthroscopic partial meniscectomy;  Surgeon: Dane Blanco DO;  Location: [facility]     COLONOSCOPY  06/12/07     COLONOSCOPY N/A 7/12/2017    Procedure: COLONOSCOPY;  colonoscopy;  Surgeon: Lake Mueller MD;  Location:  GI     HC TRABECULOPLASTY BY LASER SURGERY  2000    LASIK     SEPTOPLASTY, TURBINOPLASTY, COMBINED N/A 4/26/2016    Procedure: COMBINED SEPTOPLASTY, TURBINOPLASTY;  Surgeon: Ken Molina MD;  Location:  OR     [facility]       Social History     Socioeconomic History     Marital status:      Spouse name: Cherelle     Number of children: 5     Years of education: 18     Highest education level: Not on file   Occupational History     Occupation: Elevator service     Comment: [employer]   Social Needs     Financial resource strain: Not on file     Food insecurity:     Worry: Not on file     Inability: Not on file     Transportation needs:     Medical: Not on file     Non-medical: Not on file   Tobacco Use     Smoking status: Never Smoker     Smokeless tobacco: Never Used     Tobacco  comment: no smokers in household   Substance and Sexual Activity     Alcohol use: No     Drug use: No     Sexual activity: Yes     Partners: Female     Birth control/protection: Surgical, Female Surgical     Comment: Wife had a tubal, 5 children   Lifestyle     Physical activity:     Days per week: Not on file     Minutes per session: Not on file     Stress: Not on file   Relationships     Social connections:     Talks on phone: Not on file     Gets together: Not on file     Attends Anglican service: Not on file     Active member of club or organization: Not on file     Attends meetings of clubs or organizations: Not on file     Relationship status: Not on file     Intimate partner violence:     Fear of current or ex partner: Not on file     Emotionally abused: Not on file     Physically abused: Not on file     Forced sexual activity: Not on file   Other Topics Concern     Parent/sibling w/ CABG, MI or angioplasty before 65F 55M? No      Service Not Asked     Blood Transfusions Not Asked     Caffeine Concern No     Comment: 1-2     Occupational Exposure Not Asked     Hobby Hazards Not Asked     Sleep Concern Not Asked     Stress Concern Not Asked     Weight Concern Not Asked     Special Diet Not Asked     Back Care Not Asked     Exercise Not Asked     Bike Helmet Not Asked     Seat Belt Not Asked     Self-Exams Not Asked   Social History Narrative    Lives at home with wife, mother-in-law and daughter (recently discharged from [facility] on medical). 4/3/2017      Family History   Problem Relation Age of Onset     Cerebrovascular Disease Mother 66        aneursym -  at 66     Other - See Comments Sister         Half sister, heart problems unsure what kind     Other - See Comments Brother         Was in a fire     No Known Problems Maternal Grandmother      No Known Problems Maternal Grandfather      Arthritis Maternal Aunt      Arthritis Maternal Uncle      Muscular Disorder Daughter 18        Small fiber  "neuropathy     Unknown/Adopted Father      Unknown/Adopted Paternal Grandmother      Unknown/Adopted Paternal Grandfather      Hypertension No family hx of      Diabetes No family hx of         ROS: 10 point ROS neg other than the symptoms noted above in the HPI.    Physical Exam  BP (!) 152/82   Pulse 91   Temp 98  F (36.7  C) (Oral)   Resp 16   Ht 1.753 m (5' 9\")   Wt 88 kg (194 lb)   SpO2 97%   BMI 28.65 kg/m    HEENT:  Normocephalic, atraumatic.  PERRLA.  EOM s intact.  Visual fields full to gross exam  Neck:  Supple, non-tender, without lymphadenopathy.  Heart:  No peripheral edema  Lungs:  No SOB  Abdomen:  Non-distended.   Skin:  Warm and dry.  Extremities:  No edema, cyanosis or clubbing.  Psychiatric:  No apparent distress  Musculoskeletal:  Normal bulk and tone    NEUROLOGICAL EXAMINATION:     Mental status:  Alert and Oriented x 3, speech is fluent.  Cranial nerves:  II-XII intact.   Motor:    Shoulder Abduction:  Right:  5/5   Left:  5/5  Biceps:                      Right:  5/5   Left:  4+/5  Triceps:                     Right:  5/5   Left:  5/5  Wrist Extensors:       Right:  5/5   Left:  5/5  Wrist Flexors:           Right:  5/5   Left:  5/5  interosseus :            Right:  5/5   Left:  5/5  Hip Flexion:                Right: 5/5  Left:  5/5  Quadriceps:             Right:  5/5  Left:  5/5  Hamstrings:             Right:  5/5  Left:  5/5  Gastroc Soleus:        Right:  5/5  Left:  5/5  Tib/Ant:                      Right:  5/5  Left:  5/5  EHL:                     Right:  5/5  Left:  5/5  Sensation:  Left thumb numbness  Reflexes:  Negative Babinski.  Negative Clonus.  Negative Sena's.  Coordination:  Smooth finger to nose testing.   Negative pronator drift.  Smooth tandem walking.    A/P:  59M w/ severe left arm pain and weakness, left C5-6 severe foraminal stenosis    I had a discussion with the patient, reviewing the history, symptoms, and imaging  Will plan for C5-6 ACDF  Risks and " benefits discussed

## 2019-11-04 NOTE — LETTER
11/4/2019         RE: Benjamín Mendoza  [id number] [facility] [id number]        Dear Colleague,    Thank you for referring your patient, Benjamín Mendoza, to the [facility]. Please see a copy of my visit note below.    I was asked by Dr. Prasad to see this patient in consultation    59M w/ severe left arm pain and weakness, left C5-6 severe foraminal stenosis.  3 months of 8/10, sharp pain from neck to biceps, forearm, and thumb.  Left biceps weakness and numbness to thenar hand.  Chiropractic care and PT without improvement.       Past Medical History:   Diagnosis Date     Chronic back pain     percocet, spinal fluid leak dx but never visible     Chronic headaches      Esophageal reflux      GERD (gastroesophageal reflux disease)      Motion sickness      CARY (obstructive sleep apnea) 3/2009    AHI 26 Ndir 84%     Primary osteoarthritis of left knee 4/17/2019     RLS (restless legs syndrome)      Past Surgical History:   Procedure Laterality Date     ARTHROSCOPY KNEE WITH MEDIAL MENISCECTOMY Left 6/8/2018    Procedure: ARTHROSCOPY KNEE WITH MEDIAL MENISCECTOMY;  Left knee arthroscopy with arthroscopic partial meniscectomy;  Surgeon: Dane Blanco DO;  Location: [facility]     COLONOSCOPY  06/12/07     COLONOSCOPY N/A 7/12/2017    Procedure: COLONOSCOPY;  colonoscopy;  Surgeon: Lake Mueller MD;  Location:  GI     HC TRABECULOPLASTY BY LASER SURGERY  2000    LASIK     SEPTOPLASTY, TURBINOPLASTY, COMBINED N/A 4/26/2016    Procedure: COMBINED SEPTOPLASTY, TURBINOPLASTY;  Surgeon: Ken Molina MD;  Location:  OR     [facility]       Social History     Socioeconomic History     Marital status:      Spouse name: Cherelle     Number of children: 5     Years of education: 18     Highest education level: Not on file   Occupational History     Occupation: Elevator service     Comment: [employer]   Social Needs     Financial resource strain: Not on  file     Food insecurity:     Worry: Not on file     Inability: Not on file     Transportation needs:     Medical: Not on file     Non-medical: Not on file   Tobacco Use     Smoking status: Never Smoker     Smokeless tobacco: Never Used     Tobacco comment: no smokers in household   Substance and Sexual Activity     Alcohol use: No     Drug use: No     Sexual activity: Yes     Partners: Female     Birth control/protection: Surgical, Female Surgical     Comment: Wife had a tubal, 5 children   Lifestyle     Physical activity:     Days per week: Not on file     Minutes per session: Not on file     Stress: Not on file   Relationships     Social connections:     Talks on phone: Not on file     Gets together: Not on file     Attends Zoroastrianism service: Not on file     Active member of club or organization: Not on file     Attends meetings of clubs or organizations: Not on file     Relationship status: Not on file     Intimate partner violence:     Fear of current or ex partner: Not on file     Emotionally abused: Not on file     Physically abused: Not on file     Forced sexual activity: Not on file   Other Topics Concern     Parent/sibling w/ CABG, MI or angioplasty before 65F 55M? No      Service Not Asked     Blood Transfusions Not Asked     Caffeine Concern No     Comment: 1-2     Occupational Exposure Not Asked     Hobby Hazards Not Asked     Sleep Concern Not Asked     Stress Concern Not Asked     Weight Concern Not Asked     Special Diet Not Asked     Back Care Not Asked     Exercise Not Asked     Bike Helmet Not Asked     Seat Belt Not Asked     Self-Exams Not Asked   Social History Narrative    Lives at home with wife, mother-in-law and daughter ([facility] 4/3/2017      Family History   Problem Relation Age of Onset     Cerebrovascular Disease Mother 66        aneursym -  at 66     Other - See Comments Sister         Half sister, heart problems unsure what kind      "Other - See Comments Brother         Was in a fire     No Known Problems Maternal Grandmother      No Known Problems Maternal Grandfather      Arthritis Maternal Aunt      Arthritis Maternal Uncle      Muscular Disorder Daughter 18        Small fiber neuropathy     Unknown/Adopted Father      Unknown/Adopted Paternal Grandmother      Unknown/Adopted Paternal Grandfather      Hypertension No family hx of      Diabetes No family hx of         ROS: 10 point ROS neg other than the symptoms noted above in the HPI.    Physical Exam  BP (!) 152/82   Pulse 91   Temp 98  F (36.7  C) (Oral)   Resp 16   Ht 1.753 m (5' 9\")   Wt 88 kg (194 lb)   SpO2 97%   BMI 28.65 kg/m     HEENT:  Normocephalic, atraumatic.  PERRLA.  EOM s intact.  Visual fields full to gross exam  Neck:  Supple, non-tender, without lymphadenopathy.  Heart:  No peripheral edema  Lungs:  No SOB  Abdomen:  Non-distended.   Skin:  Warm and dry.  Extremities:  No edema, cyanosis or clubbing.  Psychiatric:  No apparent distress  Musculoskeletal:  Normal bulk and tone    NEUROLOGICAL EXAMINATION:     Mental status:  Alert and Oriented x 3, speech is fluent.  Cranial nerves:  II-XII intact.   Motor:    Shoulder Abduction:  Right:  5/5   Left:  5/5  Biceps:                      Right:  5/5   Left:  4+/5  Triceps:                     Right:  5/5   Left:  5/5  Wrist Extensors:       Right:  5/5   Left:  5/5  Wrist Flexors:           Right:  5/5   Left:  5/5  interosseus :            Right:  5/5   Left:  5/5  Hip Flexion:                Right: 5/5  Left:  5/5  Quadriceps:             Right:  5/5  Left:  5/5  Hamstrings:             Right:  5/5  Left:  5/5  Gastroc Soleus:        Right:  5/5  Left:  5/5  Tib/Ant:                      Right:  5/5  Left:  5/5  EHL:                     Right:  5/5  Left:  5/5  Sensation:  Left thumb numbness  Reflexes:  Negative Babinski.  Negative Clonus.  Negative Sena's.  Coordination:  Smooth finger to nose testing.   Negative " pronator drift.  Smooth tandem walking.    A/P:  59M w/ severe left arm pain and weakness, left C5-6 severe foraminal stenosis    I had a discussion with the patient, reviewing the history, symptoms, and imaging  Will plan for C5-6 ACDF  Risks and benefits discussed         Again, thank you for allowing me to participate in the care of your patient.        Sincerely,        Sebas Vasquez MD

## 2019-11-04 NOTE — PATIENT INSTRUCTIONS
Surgery scheduled at [facility] for 5-6 ACDF (anterior cervical discectomy and fusion)       Pre-Operative:  -Surgical risks: blood clots in the leg or lung, problems urinating, nerve damage, drainage from the incision, infection, stiffness.  - Pre-operative physical with primary care physician within 30 days of surgical date.   -Stop all solid foods and liquids 8 hours before surgery.    -Shower procedure: Please shower with antibacterial soap the night before surgery and the morning of surgery. Refer to information sheet in folder.   - Discontinue Aspirin, NSAIDs (Advil, Ibuprofen, Naproxen, Nuprin, Diclofenac, Meloxicam, Aleve, Celebrex) x 7 days prior to surgical date. After surgery, do not begin taking these medications until given clearance. May cause bleeding and interfere with bone healing.  - May take Tylenol for pain.      Post-Operative:  - Most likely same day procedure, may stay for 23 hour observation  - Post operative pain may require pain medications and muscle relaxants. You will receive medications upon discharge.  -Do NOT drive while taking narcotic pain medication.  -Post operative incision care- Watch for signs of infection: redness, swelling, warmth, drainage, and fever of 101 degrees or higher. Notify clinic [phone].  -Keep incision clean and dry. You may shower. No submerging incision in water such as pools, hot tubs, baths for at least 8 weeks or until incision is healed.   - Post operative activity limitations for 6 weeks after surgery: no lifting > 10 pounds, limited bending, twisting, or overhead reaching. You will be re-evaluated at your follow up appointments.   -If a brace is required per Dr. Vasquez, [facility] will fit you for the brace in the hospital.  -If you are currently employed, you will need to be off work for recovery and healing. Please fax any FMLA/short term disability paperwork to [phone]. You may call our clinic when you'd like to return to work  and we can provide a work letter.   - Follow up appointments: 6 week post op, 3 months post op, 6 months post op, 1 year post op. You will need to an xray before each appointment. Please call to schedule these appointments at [phone].

## 2019-11-04 NOTE — NURSING NOTE
Patient Education    Education included but not limited to:  - Surgical risks: blood clots, urinating difficulties, nerve damage, infection.  - Pre-operative physical with primary care physician within 30 days of surgical date.   - Pre-operative clearance from other pertaining specialties.   - Discontinue NSAIDS x 7 days prior to surgical date.   -Do not begin taking NSAIDs (Advil, Motrin, Ibuprofen, Nuprin, Diclofenac, Meloxicam, Aleve, Celebrex, Aspirin, etc.) until 6 weeks after surgery if you had a fusion. May cause bleeding and interfere with bone healing.    -May try Tylenol for pain.  -Smoking cessation  -Discussed being off work after surgery, short term disability, FMLA, etc.   -Forms to be completed    -Pre-op timeline: NPO, shower, medications    -Hospital stay: Checking in, surgery, recovery room, hospital room.    - Post operative pain management: narcotics, muscle relaxants, ice, etc.   -No driving while taking narcotics     -Post operative incision care:   Keep your incision clean and dry.   Okay to shower. No submerging in water until incision healed.   Watch for signs of infection and notify clinic if drainage or fever develops.   - Post operative activity limitations recommended until follow up appointment: no lifting > 10 pounds; limited bending, twisting, overhead reaching.  -If a brace is required per Dr. Vasquez, Orthotics will fit you for the brace in the hospital.  - Follow up appointments: 6 week post op, 3 months post op, 6 months post op, 1 year post op. Please call to schedule follow up appointment at [phone].   - Education book was also given to the patient for further review.      Patient verbalized understanding of above instructions. All questions were answered to the best of my ability and the patient's satisfaction. Patient advised to call with any additional questions or concerns.

## 2019-11-04 NOTE — NURSING NOTE
"Benjamín Mendoza is a 59 year old male who presents for:  Chief Complaint   Patient presents with     Neck Pain        Initial Vitals:  BP (!) 152/82   Pulse 91   Temp 98  F (36.7  C) (Oral)   Resp 16   Ht 5' 9\" (1.753 m)   Wt 194 lb (88 kg)   SpO2 97%   BMI 28.65 kg/m   Estimated body mass index is 28.65 kg/m  as calculated from the following:    Height as of this encounter: 5' 9\" (1.753 m).    Weight as of this encounter: 194 lb (88 kg).. Body surface area is 2.07 meters squared. BP completed using cuff size: regular  Moderate Pain (4)        Nursing Comments: Pt present today for neck pain.        Jason Gaspar CMA    "

## 2019-11-11 ENCOUNTER — TRANSFERRED RECORDS (OUTPATIENT)
Dept: HEALTH INFORMATION MANAGEMENT | Facility: CLINIC | Age: 59
End: 2019-11-11

## 2019-11-21 ENCOUNTER — MYC MEDICAL ADVICE (OUTPATIENT)
Dept: FAMILY MEDICINE | Facility: OTHER | Age: 59
End: 2019-11-21

## 2019-11-21 DIAGNOSIS — E78.5 HYPERLIPIDEMIA, UNSPECIFIED HYPERLIPIDEMIA TYPE: ICD-10-CM

## 2019-11-21 RX ORDER — ATORVASTATIN CALCIUM 40 MG/1
40 TABLET, FILM COATED ORAL DAILY
Qty: 90 TABLET | Refills: 2 | Status: SHIPPED | OUTPATIENT
Start: 2019-11-21 | End: 2020-07-28

## 2019-11-21 NOTE — TELEPHONE ENCOUNTER
Pending Prescriptions:                       Disp   Refills    atorvastatin (LIPITOR) 40 MG tablet       90 tab*2            Sig: Take 1 tablet (40 mg) by mouth daily    Prescription approved per [facility] Refill Protocol.    Claudia Olivier, RN, BSN

## 2019-11-21 NOTE — TELEPHONE ENCOUNTER
Pended medication and entered pharmacy. Routed to refill pool.    Tabatha Simental CMA ([facility])

## 2019-11-27 ENCOUNTER — TELEPHONE (OUTPATIENT)
Dept: FAMILY MEDICINE | Facility: OTHER | Age: 59
End: 2019-11-27

## 2019-11-27 NOTE — TELEPHONE ENCOUNTER
Our goal is to have forms completed with 72 hours, however some forms may require a visit or additional information.    Who is the form from?: [facility] (if other please explain)  Where the form came from: form was faxed in  What clinic location was the form placed at?: [facility]  Where the form was placed: placed in [facility] inbox     What number is listed as a contact on the form?: [phone]  Fax number to return form to:  [phone]        Call taken on 11/27/2019 at 8:27 AM by Monica Basurto

## 2019-12-03 ENCOUNTER — TRANSFERRED RECORDS (OUTPATIENT)
Dept: HEALTH INFORMATION MANAGEMENT | Facility: CLINIC | Age: 59
End: 2019-12-03

## 2019-12-08 ENCOUNTER — HEALTH MAINTENANCE LETTER (OUTPATIENT)
Age: 59
End: 2019-12-08

## 2019-12-10 NOTE — PROGRESS NOTES
Subjective     Benjamín Mendoza is a 59 year old male who presents to clinic today for the following health issues:    HPI     Concern - Derm concern  Onset: over a year     Description:   Two brown eraser sized moles on chest, one is changing larger and is dry/scaley it is also itchy on occasion .  He worries as they do go to [place] and go into tanning beds and tan while in [place].        Concern - L ear fluid   Onset: since this summer     Description:   Was seen in August, tried the suggested allergy medications with some improvement,  Constantly feels like there is water in ear towards evening especially, feels hallow per pt.  No pain, no crackles and her hearing is normal.      BP Readings from Last 3 Encounters:   12/11/19 126/80   11/04/19 (!) 152/82   10/09/19 (!) 144/96    Wt Readings from Last 3 Encounters:   12/11/19 87 kg (191 lb 11.2 oz)   11/04/19 88 kg (194 lb)   10/09/19 86.2 kg (190 lb)                    Reviewed and updated as needed this visit by Provider  Tobacco  Allergies  Meds  Problems  Med Hx  Surg Hx  Fam Hx         Review of Systems   As documented above       Objective    /80   Pulse 76   Temp 97.9  F (36.6  C) (Temporal)   Resp 16   Wt 87 kg (191 lb 11.2 oz)   SpO2 100%   BMI 28.31 kg/m    Body mass index is 28.31 kg/m .  Physical Exam   GENERAL: healthy, alert and no distress  HENT: ear canals and TM's normal, nose and mouth without ulcers or lesions  NECK: no adenopathy, no asymmetry, masses, or scars and thyroid normal to palpation  SKIN: Gary K on center of chest, one is more flat and approx 0.5 cm and the larger of the 2 is more elevated and slightly larger, 0.7 cm     Diagnostic Test Results:  Labs reviewed in Epic  none         Assessment & Plan     1. Dysfunction of left eustachian tube  He will continue with antihistamines as he has noticed some benefit and also add nasal steroid such as Flonase or Nasacort.  If this is not helpful we could consider referral to  "ear nose and throat though he states it did not that bothersome, he will likely continue to observe    2. Seborrheic keratoses  Discussed the fact that we could freeze the lesions if they were bothersome otherwise he will continue to observe.       BMI:   Estimated body mass index is 28.31 kg/m  as calculated from the following:    Height as of 11/4/19: 1.753 m (5' 9\").    Weight as of this encounter: 87 kg (191 lb 11.2 oz).   Weight management plan: Discussed healthy diet and exercise guidelines            Return in about 6 months (around 6/11/2020), or within , for Physical Exam.    Aleida Larson PA-C  [facility]    "

## 2019-12-11 ENCOUNTER — OFFICE VISIT (OUTPATIENT)
Dept: FAMILY MEDICINE | Facility: OTHER | Age: 59
End: 2019-12-11
Payer: COMMERCIAL

## 2019-12-11 VITALS
OXYGEN SATURATION: 100 % | DIASTOLIC BLOOD PRESSURE: 80 MMHG | SYSTOLIC BLOOD PRESSURE: 126 MMHG | RESPIRATION RATE: 16 BRPM | HEART RATE: 76 BPM | BODY MASS INDEX: 28.31 KG/M2 | TEMPERATURE: 97.9 F | WEIGHT: 191.7 LBS

## 2019-12-11 DIAGNOSIS — H69.92 DYSFUNCTION OF LEFT EUSTACHIAN TUBE: Primary | ICD-10-CM

## 2019-12-11 DIAGNOSIS — L82.1 SEBORRHEIC KERATOSES: ICD-10-CM

## 2019-12-11 PROCEDURE — 99213 OFFICE O/P EST LOW 20 MIN: CPT | Performed by: PHYSICIAN ASSISTANT

## 2019-12-16 ENCOUNTER — MYC MEDICAL ADVICE (OUTPATIENT)
Dept: ORTHOPEDICS | Facility: OTHER | Age: 59
End: 2019-12-16

## 2019-12-18 ENCOUNTER — OFFICE VISIT (OUTPATIENT)
Dept: ORTHOPEDICS | Facility: CLINIC | Age: 59
End: 2019-12-18
Payer: COMMERCIAL

## 2019-12-18 VITALS
DIASTOLIC BLOOD PRESSURE: 88 MMHG | HEIGHT: 69 IN | SYSTOLIC BLOOD PRESSURE: 136 MMHG | WEIGHT: 196 LBS | BODY MASS INDEX: 29.03 KG/M2

## 2019-12-18 DIAGNOSIS — M17.12 PRIMARY OSTEOARTHRITIS OF LEFT KNEE: Primary | ICD-10-CM

## 2019-12-18 PROCEDURE — 20610 DRAIN/INJ JOINT/BURSA W/O US: CPT | Mod: LT | Performed by: ORTHOPAEDIC SURGERY

## 2019-12-18 RX ORDER — TRIAMCINOLONE ACETONIDE 40 MG/ML
40 INJECTION, SUSPENSION INTRA-ARTICULAR; INTRAMUSCULAR ONCE
Status: COMPLETED | OUTPATIENT
Start: 2019-12-18 | End: 2019-12-18

## 2019-12-18 RX ADMIN — TRIAMCINOLONE ACETONIDE 40 MG: 40 INJECTION, SUSPENSION INTRA-ARTICULAR; INTRAMUSCULAR at 13:02

## 2019-12-18 ASSESSMENT — MIFFLIN-ST. JEOR: SCORE: 1704.43

## 2019-12-18 ASSESSMENT — PAIN SCALES - GENERAL: PAINLEVEL: SEVERE PAIN (7)

## 2019-12-18 NOTE — PROGRESS NOTES
Office Visit-Follow up    Chief Complaint: Benjamín Mendoza is a 59 year old male who is being seen for   Chief Complaint   Patient presents with     RECHECK      left knee primary osteoarthritis       History of Present Illness:   Today's visit:  Returns for medial sided knee pain.  Same pain is in the past.  Over the weekend he was working on his car.  No specific injuries.  However started having increasing pain.  Rates the pain is moderate to severe.  Causing him to limp.  Previous injections have been very helpful.  He is requesting an injection.  August 8, 2019 visit:  Returns for his left knee.  Pain along the medial side.  Previous injection has provided him relief.  He is been taken Tylenol ibuprofen for other issues.  He reports for a few weeks it was extremely flared up and he called for an appointment.  By the time he is able to get in the pain has improved.  Continues to have some discomfort medially.  Also complains of stiffness.  April 17, 2019 visit:     Returns for left medial knee pain.  Different pain than in the past.  Rates it is moderate to severe at times.  Describes it as aching and sharp.  Nonradiating.  No new injuries.  On his last visit he received an intra-articular injection which provided a little less than 6 weeks relief.  He is been taken ibuprofen.  He presents with his wife.     January 2, 2018 visit:  Returns for continued left medial knee achiness.  He reports the sharp pain from the meniscus tear preoperatively is gone.  Now more of a gradual achy soreness.  It is never significantly improved since his last visit.  It has actually worsened some.  Ibuprofen has not been helpful.  He rates it as mild to moderate at times.  It does cause him to limp at times.            REVIEW OF SYSTEMS  General: negative for, night sweats, dizziness, fatigue  Resp: No shortness of breath and no cough  CV: negative for chest pain, syncope or near-syncope  GI: negative for nausea, vomiting and  "diarrhea  : negative for dysuria and hematuria  Musculoskeletal: as above  Neurologic: negative for syncope   Hematologic: negative for bleeding disorder    Physical Exam:  Vitals: /88   Ht 1.753 m (5' 9\")   Wt 88.9 kg (196 lb)   BMI 28.94 kg/m    BMI= Body mass index is 28.94 kg/m .  Constitutional: healthy, alert and no acute distress   Psychiatric: mentation appears normal and affect normal/bright  NEURO: no focal deficits  RESP: Normal with easy respirations and no use of accessory muscles to breathe, no audible wheezing or retractions  CV: LLE:  no edema         Regular rate and rhythm by palpation  SKIN: No erythema, rashes, excoriation, or breakdown. No evidence of infection.   JOINT/EXTREMITIES:left knee: Varus deformity.  Small to moderate effusion.  Exquisite tenderness on the medial joint line.  No focal bony tenderness.  Pseudolaxity medial with valgus stressing.  Full active motion.  GAIT: antalgic            Diagnostic Modalities:  Previous imaging reviewed.  Independent visualization of the images was performed.      Impression: left knee primary osteoarthritis    Plan:  All of the above pertinent physical exam and imaging modalities findings was reviewed with Benjamín.                                          INJECTION PROCEDURE:  The patient was counseled about an  injection, including discussion of risks (including infection), contents of the injection, rationale for performing the injection, and expected benefits of the injection. The skin was prepped with alcohol and betadine and then utilizing sterile technique an injection of the left knee joint from the anterolateral approach in the seated position was performed. The injection consisted 1ml of Kenalog (40mg per 1 ml) mixed with 3ml of 0.5% Marcaine. The patient tolerated the injection well, and there were no complications. The injection site was covered with a Band-Aid. The injection was performed by MYRTLE Osorio, CNP, " DNP    Options discussed.  Previous injections provided good relief.  We discussed the amount injection she can receive.  We also reviewed surgical treatment which means knee replacement.  It is certainly up to him on how aggressive he would like to proceed in the future.  At this point he wants to continue with the injections.    Return to clinic 4-6 , week(s) as needed, or sooner as needed for changes.  Re-x-ray on return: No    Richard Blanco D.O.

## 2019-12-18 NOTE — LETTER
12/18/2019         RE: Benjamín Mendoza  [address] [facility] [address]        Dear Colleague,    Thank you for referring your patient, Benjamín Mendoza, to the [facility]. Please see a copy of my visit note below.    Benjamín to follow up with Primary Care provider regarding elevated blood pressure.    Clinic Administered Medication Documentation    MEDICATION LIST:   Injection Documentation     Injection was administered by provider (please see MAR for given by information). Please see MAR and medication order for additional information.     Site: Joint injection   Medication Used: Kenalog    Exp:       The following medication was given by Chester Sosa, MYRTLE, CNP, DNP:     MEDICATION: Kenalog 40mg/1ml  ROUTE: Joint Injection  SITE: left knee  DOSE: 1 mL  LOT #: [id number]  : [employer]  EXPIRATION DATE:    NDC: [address]-5                          Office Visit-Follow up    Chief Complaint: Benjamín Mendoza is a 59 year old male who is being seen for   Chief Complaint   Patient presents with     RECHECK      left knee primary osteoarthritis       History of Present Illness:   Today's visit:  Returns for medial sided knee pain.  Same pain is in the past.  Over the weekend he was working on his car.  No specific injuries.  However started having increasing pain.  Rates the pain is moderate to severe.  Causing him to limp.  Previous injections have been very helpful.  He is requesting an injection.  August 8, 2019 visit:  Returns for his left knee.  Pain along the medial side.  Previous injection has provided him relief.  He is been taken Tylenol ibuprofen for other issues.  He reports for a few weeks it was extremely flared up and he called for an appointment.  By the time he is able to get in the pain has improved.  Continues to have some discomfort medially.  Also complains of stiffness.  April 17, 2019 visit:     Returns for left medial knee pain.  Different  "pain than in the past.  Rates it is moderate to severe at times.  Describes it as aching and sharp.  Nonradiating.  No new injuries.  On his last visit he received an intra-articular injection which provided a little less than 6 weeks relief.  He is been taken ibuprofen.  He presents with his wife.     January 2, 2018 visit:  Returns for continued left medial knee achiness.  He reports the sharp pain from the meniscus tear preoperatively is gone.  Now more of a gradual achy soreness.  It is never significantly improved since his last visit.  It has actually worsened some.  Ibuprofen has not been helpful.  He rates it as mild to moderate at times.  It does cause him to limp at times.            REVIEW OF SYSTEMS  General: negative for, night sweats, dizziness, fatigue  Resp: No shortness of breath and no cough  CV: negative for chest pain, syncope or near-syncope  GI: negative for nausea, vomiting and diarrhea  : negative for dysuria and hematuria  Musculoskeletal: as above  Neurologic: negative for syncope   Hematologic: negative for bleeding disorder    Physical Exam:  Vitals: /88   Ht 1.753 m (5' 9\")   Wt 88.9 kg (196 lb)   BMI 28.94 kg/m     BMI= Body mass index is 28.94 kg/m .  Constitutional: healthy, alert and no acute distress   Psychiatric: mentation appears normal and affect normal/bright  NEURO: no focal deficits  RESP: Normal with easy respirations and no use of accessory muscles to breathe, no audible wheezing or retractions  CV: LLE:  no edema         Regular rate and rhythm by palpation  SKIN: No erythema, rashes, excoriation, or breakdown. No evidence of infection.   JOINT/EXTREMITIES:left knee: Varus deformity.  Small to moderate effusion.  Exquisite tenderness on the medial joint line.  No focal bony tenderness.  Pseudolaxity medial with valgus stressing.  Full active motion.  GAIT: antalgic            Diagnostic Modalities:  Previous imaging reviewed.  Independent visualization of the " images was performed.      Impression: left knee primary osteoarthritis    Plan:  All of the above pertinent physical exam and imaging modalities findings was reviewed with Benjamín.                                          INJECTION PROCEDURE:  The patient was counseled about an  injection, including discussion of risks (including infection), contents of the injection, rationale for performing the injection, and expected benefits of the injection. The skin was prepped with alcohol and betadine and then utilizing sterile technique an injection of the left knee joint from the anterolateral approach in the seated position was performed. The injection consisted 1ml of Kenalog (40mg per 1 ml) mixed with 3ml of 0.5% Marcaine. The patient tolerated the injection well, and there were no complications. The injection site was covered with a Band-Aid. The injection was performed by Randell Sosa, APRN, CNP, DNP    Options discussed.  Previous injections provided good relief.  We discussed the amount injection she can receive.  We also reviewed surgical treatment which means knee replacement.  It is certainly up to him on how aggressive he would like to proceed in the future.  At this point he wants to continue with the injections.    Return to clinic 4-6 , week(s) as needed, or sooner as needed for changes.  Re-x-ray on return: No    Richard Blanco D.O.          Again, thank you for allowing me to participate in the care of your patient.        Sincerely,        Dane Blanco, DO

## 2019-12-18 NOTE — PROGRESS NOTES
Clinic Administered Medication Documentation    MEDICATION LIST:   Injection Documentation     Injection was administered by provider (please see MAR for given by information). Please see MAR and medication order for additional information.     Site: Joint injection   Medication Used: Kenalog    Exp:       The following medication was given by MYRTLE Noble, CNP, DNP:     MEDICATION: Kenalog 40mg/1ml  ROUTE: Joint Injection  SITE: left knee  DOSE: 1 mL  LOT #: [id number]  : [employer]  EXPIRATION DATE:    NDC: [address]-4

## 2019-12-23 ENCOUNTER — TELEPHONE (OUTPATIENT)
Dept: FAMILY MEDICINE | Facility: CLINIC | Age: 59
End: 2019-12-23

## 2019-12-24 NOTE — TELEPHONE ENCOUNTER
Our goal is to have forms completed with 72 hours, however some forms may require a visit or additional information.    Who is the form from?: [facility] (if other please explain)  Where the form came from: form was faxed in  What clinic location was the form placed at?: [facility]  Where the form was placed: placed in [facility] inbox     What number is listed as a contact on the form?: [phone]  Fax number to return form to:  [phone]        Call taken on 12/23/2019 at 6:50 PM by Monica Basurto

## 2020-04-03 ENCOUNTER — TELEPHONE (OUTPATIENT)
Dept: FAMILY MEDICINE | Facility: OTHER | Age: 60
End: 2020-04-03

## 2020-04-03 NOTE — TELEPHONE ENCOUNTER
Reason for Call:  Other injection in knee    Detailed comments: patient wondering if he is able to get into have injection in left knee    Phone Number Patient can be reached at: Home number on file [phone] (home)    Best Time: any    Can we leave a detailed message on this number? YES    Call taken on 4/3/2020 at 11:03 AM by Taina Umanzor

## 2020-04-03 NOTE — TELEPHONE ENCOUNTER
What is the patient's chief complaint?: left knee pain-hx of getting steroid injections -patient request a review by Dr. Blanco Team to see if he could just come in for injection, informed patient at this time we are requesting Non-emergent visits, will route to Dr. Blanco for review, patient verbally states he understands and is ok with waiting until Dr. Blanco/team is back in office Monday April 6, 2020.      When did it start?: last steroid injection 12/2020      Did anything happen to cause the problem (i.e. trauma, surgery, etc)?: none      Are there any signs of infection? (if yes, transfer call to triage RN): none  o Redness  o Warmth  o Swelling  o Drainage  o Fever  o Generalized Fatigue      What things have you done at home for this problem? (Rest, Ice, Compression, Elevation, NSAIDS, etc.): has been taking Ibu 800mg up to TID       Pain (rate on 1-10 scale, radiate? Etc.): 6-7   Does this pain keep you up at night?: no  If pain is >5 ask: Do you feel that this pain would bring you to the ER within the next 14 days?: no      Podiatry related only: Are there any signs of ischemia? (increased pain with walking that subsides after resting, or black tissue) (If yes, route call to triage RN). : none      Any recent history of surgery? Left knee arthroscopy with arthroscopic partial meniscectomy 06-       Is this something that you feel would bring you to the ER within the next 14 days? :no      Was COVID screening done by reception? If not, ask before scheduling.         Leticia Warren on 4/3/2020 at 11:56 AM

## 2020-04-04 NOTE — TELEPHONE ENCOUNTER
If pain is tolerable, avoid coming in for injection. If pain wakes at night, could consider injection.

## 2020-04-08 NOTE — TELEPHONE ENCOUNTER
Patient feels this is tolerable at this time and will reevaluate in a few weeks or call back sooner if it worsens.     Deirdre CORADO, Lead RN, BSN. . .  4/8/2020, 2:54 PM

## 2020-04-09 DIAGNOSIS — K21.9 GASTROESOPHAGEAL REFLUX DISEASE WITHOUT ESOPHAGITIS: ICD-10-CM

## 2020-05-20 ENCOUNTER — TRANSFERRED RECORDS (OUTPATIENT)
Dept: HEALTH INFORMATION MANAGEMENT | Facility: CLINIC | Age: 60
End: 2020-05-20

## 2020-05-28 ENCOUNTER — OFFICE VISIT (OUTPATIENT)
Dept: ORTHOPEDICS | Facility: CLINIC | Age: 60
End: 2020-05-28
Payer: COMMERCIAL

## 2020-05-28 ENCOUNTER — ANCILLARY PROCEDURE (OUTPATIENT)
Dept: GENERAL RADIOLOGY | Facility: CLINIC | Age: 60
End: 2020-05-28
Attending: ORTHOPAEDIC SURGERY
Payer: COMMERCIAL

## 2020-05-28 VITALS
WEIGHT: 185.5 LBS | SYSTOLIC BLOOD PRESSURE: 140 MMHG | DIASTOLIC BLOOD PRESSURE: 82 MMHG | BODY MASS INDEX: 26.56 KG/M2 | HEIGHT: 70 IN

## 2020-05-28 DIAGNOSIS — M17.12 PRIMARY OSTEOARTHRITIS OF LEFT KNEE: Primary | ICD-10-CM

## 2020-05-28 DIAGNOSIS — M17.12 PRIMARY OSTEOARTHRITIS OF LEFT KNEE: ICD-10-CM

## 2020-05-28 PROCEDURE — 73564 X-RAY EXAM KNEE 4 OR MORE: CPT | Mod: TC

## 2020-05-28 PROCEDURE — 20610 DRAIN/INJ JOINT/BURSA W/O US: CPT | Mod: LT | Performed by: ORTHOPAEDIC SURGERY

## 2020-05-28 RX ORDER — TRIAMCINOLONE ACETONIDE 40 MG/ML
40 INJECTION, SUSPENSION INTRA-ARTICULAR; INTRAMUSCULAR ONCE
Status: COMPLETED | OUTPATIENT
Start: 2020-05-28 | End: 2020-05-28

## 2020-05-28 RX ADMIN — TRIAMCINOLONE ACETONIDE 40 MG: 40 INJECTION, SUSPENSION INTRA-ARTICULAR; INTRAMUSCULAR at 14:33

## 2020-05-28 ASSESSMENT — PAIN SCALES - GENERAL: PAINLEVEL: SEVERE PAIN (6)

## 2020-05-28 ASSESSMENT — MIFFLIN-ST. JEOR: SCORE: 1667.67

## 2020-05-28 NOTE — LETTER
5/28/2020         RE: Benjamín Mendoza  [address] [facility] [address]        Dear Colleague,    Thank you for referring your patient, Benjamín Mendoza, to the [facility]. Please see a copy of my visit note below.    Office Visit-Follow up    Chief Complaint: Benjamín Mendoza is a 60 year old male who is being seen for   Chief Complaint   Patient presents with     RECHECK     left knee primary osteoarthritis       History of Present Illness:   Today's visit:  No new injuries.  Same pain is in the past.  Is requesting an injection.  December 18, 2019 visit:  Returns for medial sided knee pain.  Same pain is in the past.  Over the weekend he was working on his car.  No specific injuries.  However started having increasing pain.  Rates the pain is moderate to severe.  Causing him to limp.  Previous injections have been very helpful.  He is requesting an injection.  August 8, 2019 visit:  Returns for his left knee.  Pain along the medial side.  Previous injection has provided him relief.  He is been taken Tylenol ibuprofen for other issues.  He reports for a few weeks it was extremely flared up and he called for an appointment.  By the time he is able to get in the pain has improved.  Continues to have some discomfort medially.  Also complains of stiffness.  April 17, 2019 visit:     Returns for left medial knee pain.  Different pain than in the past.  Rates it is moderate to severe at times.  Describes it as aching and sharp.  Nonradiating.  No new injuries.  On his last visit he received an intra-articular injection which provided a little less than 6 weeks relief.  He is been taken ibuprofen.  He presents with his wife.     January 2, 2018 visit:  Returns for continued left medial knee achiness.  He reports the sharp pain from the meniscus tear preoperatively is gone.  Now more of a gradual achy soreness.  It is never significantly improved since his last visit.  It has actually worsened some.   Ibuprofen has not been helpful.  He rates it as mild to moderate at times.  It does cause him to limp at times.      Social History     Occupational History     Occupation: Elevator service     Comment: [employer]   Tobacco Use     Smoking status: Never Smoker     Smokeless tobacco: Never Used     Tobacco comment: no smokers in household   Substance and Sexual Activity     Alcohol use: No     Drug use: No     Sexual activity: Yes     Partners: Female     Birth control/protection: Surgical, Female Surgical     Comment: Wife had a tubal, 5 children       REVIEW OF SYSTEMS  General: negative for, night sweats, dizziness, fatigue  Resp: No shortness of breath and no cough  CV: negative for chest pain, syncope or near-syncope  GI: negative for nausea, vomiting and diarrhea  : negative for dysuria and hematuria  Musculoskeletal: as above  Neurologic: negative for syncope   Hematologic: negative for bleeding disorder    Physical Exam:  Vitals: There were no vitals taken for this visit.  BMI= There is no height or weight on file to calculate BMI.  Constitutional: healthy, alert and no acute distress   Psychiatric: mentation appears normal and affect normal/bright  NEURO: no focal deficits  RESP: Normal with easy respirations and no use of accessory muscles to breathe, no audible wheezing or retractions  CV: LLE:  no edema         Regular rate and rhythm by palpation  SKIN: No erythema, rashes, excoriation, or breakdown. No evidence of infection.   JOINT/EXTREMITIES:left knee: Varus deformity.  Tenderness along medial joint line.  Pseudolaxity with valgus stressing.  Collateral ligaments are intact.  Good muscle tone.  No peripheral edema.  GAIT: not tested             Diagnostic Modalities:  left knee X-ray: Bone-on-bone arthritis medial compartment.  Varus deformity.  Some minimal patellofemoral wear.  Slightly advanced compared to previous.  Independent visualization of the images was performed.      Impression:  left knee primary osteoarthritis    Plan:  All of the above pertinent physical exam and imaging modalities findings was reviewed with Benjamín.                                          INJECTION PROCEDURE:  The patient was counseled about an  injection, including discussion of risks (including infection), contents of the injection, rationale for performing the injection, and expected benefits of the injection. The skin was prepped with alcohol and betadine and then utilizing sterile technique an injection of the left knee joint from the anterolateral approach in the seated position was performed. The injection consisted 1ml of Kenalog (40mg per 1 ml) mixed with 3ml of 0.5% Marcaine. The patient tolerated the injection well, and there were no complications. The injection site was covered with a Band-Aid. The injection was performed by MYRTLE Osorio CNP, LORETTA      Options discussed.  Previous injections provided good relief.  We will proceed with another injection.  We did discuss knee replacement surgery.  We discussed typical recovery time.    Return to clinic 4-6 , week(s), PRN, or sooner as needed for changes.  Re-x-ray on return: Jeannie Blanco D.O.          Clinic Administered Medication Documentation      Injection Documentation     Injection was administered by provider (please see MAR for given by information). Please see MAR and medication order for additional information.     Site: Joint injection   Medication Used: Kenalog 40mg   Expiration Date:  10/2021  The following medication was given by MYRTLE Noble, CNP, LORETTA:     MEDICATION: Kenalog 40mg/1ml  ROUTE: Joint Injection  SITE: left knee  DOSE: 1 mL  LOT #: [id number]  : [employer]  EXPIRATION DATE:  10/2021  NDC: [address]-8                              Again, thank you for allowing me to participate in the care of your patient.        Sincerely,        Dane Blanco DO

## 2020-05-28 NOTE — PROGRESS NOTES
Clinic Administered Medication Documentation      Injection Documentation     Injection was administered by provider (please see MAR for given by information). Please see MAR and medication order for additional information.     Site: Joint injection   Medication Used: Kenalog 40mg   Expiration Date:  10/2021  The following medication was given by MYRTLE Noble, CNP, DNP:     MEDICATION: Kenalog 40mg/1ml  ROUTE: Joint Injection  SITE: left knee  DOSE: 1 mL  LOT #: [id number]  : [employer]  EXPIRATION DATE:  10/2021  NDC: [address]-3

## 2020-05-28 NOTE — PROGRESS NOTES
Office Visit-Follow up    Chief Complaint: Benjamín Mendoza is a 60 year old male who is being seen for   Chief Complaint   Patient presents with     RECHECK     left knee primary osteoarthritis       History of Present Illness:   Today's visit:  No new injuries.  Same pain is in the past.  Is requesting an injection.  December 18, 2019 visit:  Returns for medial sided knee pain.  Same pain is in the past.  Over the weekend he was working on his car.  No specific injuries.  However started having increasing pain.  Rates the pain is moderate to severe.  Causing him to limp.  Previous injections have been very helpful.  He is requesting an injection.  August 8, 2019 visit:  Returns for his left knee.  Pain along the medial side.  Previous injection has provided him relief.  He is been taken Tylenol ibuprofen for other issues.  He reports for a few weeks it was extremely flared up and he called for an appointment.  By the time he is able to get in the pain has improved.  Continues to have some discomfort medially.  Also complains of stiffness.  April 17, 2019 visit:     Returns for left medial knee pain.  Different pain than in the past.  Rates it is moderate to severe at times.  Describes it as aching and sharp.  Nonradiating.  No new injuries.  On his last visit he received an intra-articular injection which provided a little less than 6 weeks relief.  He is been taken ibuprofen.  He presents with his wife.     January 2, 2018 visit:  Returns for continued left medial knee achiness.  He reports the sharp pain from the meniscus tear preoperatively is gone.  Now more of a gradual achy soreness.  It is never significantly improved since his last visit.  It has actually worsened some.  Ibuprofen has not been helpful.  He rates it as mild to moderate at times.  It does cause him to limp at times.      Social History     Occupational History     Occupation: Elevator service     Comment: [employer]   Tobacco Use      Smoking status: Never Smoker     Smokeless tobacco: Never Used     Tobacco comment: no smokers in household   Substance and Sexual Activity     Alcohol use: No     Drug use: No     Sexual activity: Yes     Partners: Female     Birth control/protection: Surgical, Female Surgical     Comment: Wife had a tubal, 5 children       REVIEW OF SYSTEMS  General: negative for, night sweats, dizziness, fatigue  Resp: No shortness of breath and no cough  CV: negative for chest pain, syncope or near-syncope  GI: negative for nausea, vomiting and diarrhea  : negative for dysuria and hematuria  Musculoskeletal: as above  Neurologic: negative for syncope   Hematologic: negative for bleeding disorder    Physical Exam:  Vitals: There were no vitals taken for this visit.  BMI= There is no height or weight on file to calculate BMI.  Constitutional: healthy, alert and no acute distress   Psychiatric: mentation appears normal and affect normal/bright  NEURO: no focal deficits  RESP: Normal with easy respirations and no use of accessory muscles to breathe, no audible wheezing or retractions  CV: LLE:  no edema         Regular rate and rhythm by palpation  SKIN: No erythema, rashes, excoriation, or breakdown. No evidence of infection.   JOINT/EXTREMITIES:left knee: Varus deformity.  Tenderness along medial joint line.  Pseudolaxity with valgus stressing.  Collateral ligaments are intact.  Good muscle tone.  No peripheral edema.  GAIT: not tested             Diagnostic Modalities:  left knee X-ray: Bone-on-bone arthritis medial compartment.  Varus deformity.  Some minimal patellofemoral wear.  Slightly advanced compared to previous.  Independent visualization of the images was performed.      Impression: left knee primary osteoarthritis    Plan:  All of the above pertinent physical exam and imaging modalities findings was reviewed with Benjamín.                                          INJECTION PROCEDURE:  The patient was counseled about an   injection, including discussion of risks (including infection), contents of the injection, rationale for performing the injection, and expected benefits of the injection. The skin was prepped with alcohol and betadine and then utilizing sterile technique an injection of the left knee joint from the anterolateral approach in the seated position was performed. The injection consisted 1ml of Kenalog (40mg per 1 ml) mixed with 3ml of 0.5% Marcaine. The patient tolerated the injection well, and there were no complications. The injection site was covered with a Band-Aid. The injection was performed by Randell Sosa, APRN, CNP, DNP      Options discussed.  Previous injections provided good relief.  We will proceed with another injection.  We did discuss knee replacement surgery.  We discussed typical recovery time.    Return to clinic 4-6 , week(s), PRN, or sooner as needed for changes.  Re-x-ray on return: No    Richard Blanco D.O.

## 2020-07-25 DIAGNOSIS — K21.9 GASTROESOPHAGEAL REFLUX DISEASE WITHOUT ESOPHAGITIS: ICD-10-CM

## 2020-07-25 DIAGNOSIS — E78.5 HYPERLIPIDEMIA, UNSPECIFIED HYPERLIPIDEMIA TYPE: ICD-10-CM

## 2020-07-27 NOTE — PROGRESS NOTES
"Benjamín Mendoza is a 60 year old male who is being evaluated via a billable telephone visit.      The patient has been notified of following:     \"This telephone visit will be conducted via a call between you and your physician/provider. We have found that certain health care needs can be provided without the need for a physical exam.  This service lets us provide the care you need with a short phone conversation.  If a prescription is necessary we can send it directly to your pharmacy.  If lab work is needed we can place an order for that and you can then stop by our lab to have the test done at a later time.    Telephone visits are billed at different rates depending on your insurance coverage. During this emergency period, for some insurers they may be billed the same as an in-person visit.  Please reach out to your insurance provider with any questions.    If during the course of the call the physician/provider feels a telephone visit is not appropriate, you will not be charged for this service.\"    Patient has given verbal consent for Telephone visit?  Yes    What phone number would you like to be contacted at? [phone]    How would you like to obtain your AVS? [place]    Subjective     Benjamín Mendoza is a 60 year old male who presents via phone visit today for the following health issues:    HPI    Hyperlipidemia Follow-Up      Are you regularly taking any medication or supplement to lower your cholesterol?   Yes- Lipitor, he is doing well with this medication without any known side effects    Are you having muscle aches or other side effects that you think could be caused by your cholesterol lowering medication?  Yes- maybe once a month    Chronic Pain Follow-Up    Where in your body do you have pain? Low back - has been a little better since he is sleeping.  He is seen through [facility] for his restless leg syndrome.  The most effective treatment he has found his methadone.  His neurologist placed him on a " very low dosage for his restless leg syndrome with good benefit.  He is sleeping the best he has since his 20s.  How has your pain affected your ability to work? Pain does not limit ability to work  Which of these pain treatments have you tried since your last clinic visit? no  How well are you sleeping? Good  How has your mood been since your last visit? Better  Have you had a significant life event? No  Other aggravating factors: repetitive activities - lifting, bending, standing, etc  Taking medication as directed? Yes    PHQ-9 SCORE 5/23/2017 5/29/2018 5/1/2019   PHQ-9 Total Score MyChart - - 6 (Mild depression)   PHQ-9 Total Score 5 11 6     GENARO-7 SCORE 5/23/2017 5/29/2018 5/1/2019   Total Score - - 4 (minimal anxiety)   Total Score 5 7 4     No flowsheet data found.  Encounter-Level CSA - 08/10/2016:    Controlled Substance Agreement - Scan on 8/22/2016  2:37 PM: CONTROLLED SUBSTANCE AGREEMENT     Patient-Level CSA:    There are no patient-level csa.           Patient Active Problem List   Diagnosis     Headache     CARY (obstructive sleep apnea)     Tinnitus of both ears     Insomnia, unspecified type     Chronic midline low back pain without sciatica     Chronic pain syndrome     Advanced directives, counseling/discussion     Chondromalacia, knee, left     Other tear of medial meniscus of left knee as current injury, initial encounter     Gastroesophageal reflux disease without esophagitis     Primary osteoarthritis of left knee     Past Surgical History:   Procedure Laterality Date     ARTHROSCOPY KNEE WITH MEDIAL MENISCECTOMY Left 6/8/2018    Procedure: ARTHROSCOPY KNEE WITH MEDIAL MENISCECTOMY;  Left knee arthroscopy with arthroscopic partial meniscectomy;  Surgeon: Dane Blanco DO;  Location: [facility]     COLONOSCOPY  06/12/07     COLONOSCOPY N/A 7/12/2017    Procedure: COLONOSCOPY;  colonoscopy;  Surgeon: Lake Mueller MD;  Location:  GI     HC TRABECULOPLASTY BY LASER SURGERY  2000     LASIK     SEPTOPLASTY, TURBINOPLASTY, COMBINED N/A 2016    Procedure: COMBINED SEPTOPLASTY, TURBINOPLASTY;  Surgeon: Ken Molina MD;  [facility]: [facility]         Social History     Tobacco Use     Smoking status: Never Smoker     Smokeless tobacco: Never Used     Tobacco comment: no smokers in household   Substance Use Topics     Alcohol use: No     Family History   Problem Relation Age of Onset     Cerebrovascular Disease Mother 66        aneursym -  at 66     Other - See Comments Sister         Half sister, heart problems unsure what kind     Other - See Comments Brother         Was in a fire     No Known Problems Maternal Grandmother      No Known Problems Maternal Grandfather      Arthritis Maternal Aunt      Arthritis Maternal Uncle      Muscular Disorder Daughter 18        Small fiber neuropathy     Unknown/Adopted Father      Unknown/Adopted Paternal Grandmother      Unknown/Adopted Paternal Grandfather      Hypertension No family hx of      Diabetes No family hx of          Current Outpatient Medications   Medication Sig Dispense Refill     atorvastatin (LIPITOR) 40 MG tablet Take 1 tablet (40 mg) by mouth daily 90 tablet 3     HORIZANT 600 MG tablet Take 600 mg by mouth At Bedtime       methadone (DOLOPHINE) 5 MG tablet        Multiple Vitamin (ONE-A-DAY MENS PO) Take 1 tablet by mouth every evening        omeprazole (PRILOSEC) 20 MG DR capsule Take 1 capsule (20 mg) by mouth daily 90 capsule 3     oxyCODONE-acetaminophen (PERCOCET) 5-325 MG tablet Take 1-2 tablets by mouth every 4 hours as needed for pain (moderate to severe) 90 tablet 0     naloxone (NARCAN) 4 MG/0.1ML nasal spray Spray 1 spray (4 mg) into one nostril alternating nostrils once as needed for opioid reversal every 2-3 minutes until assistance arrives 0.2 mL 0     Allergies   Allergen Reactions     Sumatriptan Nausea and Vomiting     Migraine medications     BP Readings from Last 3 Encounters:   20  (!) 140/82   12/18/19 136/88   12/11/19 126/80    Wt Readings from Last 3 Encounters:   05/28/20 84.1 kg (185 lb 8 oz)   12/18/19 88.9 kg (196 lb)   12/11/19 87 kg (191 lb 11.2 oz)                    Reviewed and updated as needed this visit by Provider         Review of Systems   CONSTITUTIONAL: NEGATIVE for fever, chills, change in weight  ENT/MOUTH: NEGATIVE for ear, mouth and throat problems  RESP: NEGATIVE for significant cough or SOB  CV: NEGATIVE for chest pain, palpitations or peripheral edema  GI: He is also requesting a refill of his omeprazole.  It works well for him.  He does need to use it daily however.  He has not had any issues or side effects with its use  NEURO: Follows with [facility] of neurology for his restless leg syndrome       Objective   Reported vitals:  There were no vitals taken for this visit.   healthy, alert and no distress  PSYCH: Alert and oriented times 3; coherent speech, normal   rate and volume, able to articulate logical thoughts, able   to abstract reason, no tangential thoughts, no hallucinations   or delusions  His affect is normal and pleasant  RESP: No cough, no audible wheezing, able to talk in full sentences  Remainder of exam unable to be completed due to telephone visits    Diagnostic Test Results:  Labs reviewed in Epic  none         Assessment/Plan:  This visit was completed virtually due to our current COVID 19 pandemic.  The patient will be seen as soon as possible in the office.  If there is any significant change in or worsening of symptoms patient will call in to be triaged, present to the emergency department, or call 911 if acute worsening is noted.   1. Hyperlipidemia, unspecified hyperlipidemia type  Well-controlled, patient will come to clinic for lab appointment in near future, recheck 1 year  - atorvastatin (LIPITOR) 40 MG tablet; Take 1 tablet (40 mg) by mouth daily  Dispense: 90 tablet; Refill: 3  - **Comprehensive metabolic panel FUTURE anytime;  Future  - Lipid panel reflex to direct LDL Fasting; Future    2. Gastroesophageal reflux disease without esophagitis  Stable, continue current meds recheck 1 year  - omeprazole (PRILOSEC) 20 MG DR capsule; Take 1 capsule (20 mg) by mouth daily  Dispense: 90 capsule; Refill: 3  - **CBC with platelets FUTURE anytime; Future    3. Screening for deficiency anemia    - **CBC with platelets FUTURE anytime; Future    Return in about 1 year (around 7/28/2021) for lipids.      Phone call duration:  10 minutes    Aleida Larson PA-C

## 2020-07-27 NOTE — TELEPHONE ENCOUNTER
Pending Prescriptions:                       Disp   Refills    atorvastatin (LIPITOR) 40 MG tablet [Phar*90 tab*3            Sig: TAKE 1 TABLET DAILY    omeprazole (PRILOSEC) 20 MG DR capsule [P*90 cap*3            Sig: TAKE 1 CAPSULE DAILY    Patient is due for med check/physical. Please call and schedule.    Diane Espitia, MSN, RN

## 2020-07-28 ENCOUNTER — VIRTUAL VISIT (OUTPATIENT)
Dept: FAMILY MEDICINE | Facility: OTHER | Age: 60
End: 2020-07-28
Payer: COMMERCIAL

## 2020-07-28 DIAGNOSIS — Z13.0 SCREENING FOR DEFICIENCY ANEMIA: Primary | ICD-10-CM

## 2020-07-28 DIAGNOSIS — E78.5 HYPERLIPIDEMIA, UNSPECIFIED HYPERLIPIDEMIA TYPE: ICD-10-CM

## 2020-07-28 DIAGNOSIS — K21.9 GASTROESOPHAGEAL REFLUX DISEASE WITHOUT ESOPHAGITIS: ICD-10-CM

## 2020-07-28 PROCEDURE — 99214 OFFICE O/P EST MOD 30 MIN: CPT | Mod: 95 | Performed by: PHYSICIAN ASSISTANT

## 2020-07-28 RX ORDER — METHADONE HYDROCHLORIDE 5 MG/1
10 TABLET ORAL
COMMUNITY
Start: 2020-06-30

## 2020-07-28 RX ORDER — ATORVASTATIN CALCIUM 40 MG/1
40 TABLET, FILM COATED ORAL DAILY
Qty: 90 TABLET | Refills: 3 | Status: SHIPPED | OUTPATIENT
Start: 2020-07-28 | End: 2021-06-04

## 2020-07-28 RX ORDER — ATORVASTATIN CALCIUM 40 MG/1
TABLET, FILM COATED ORAL
Qty: 90 TABLET | Refills: 3 | OUTPATIENT
Start: 2020-07-28

## 2020-07-30 ENCOUNTER — MYC MEDICAL ADVICE (OUTPATIENT)
Dept: FAMILY MEDICINE | Facility: OTHER | Age: 60
End: 2020-07-30

## 2020-08-05 DIAGNOSIS — E78.5 HYPERLIPIDEMIA, UNSPECIFIED HYPERLIPIDEMIA TYPE: ICD-10-CM

## 2020-08-05 DIAGNOSIS — Z13.0 SCREENING FOR DEFICIENCY ANEMIA: ICD-10-CM

## 2020-08-05 DIAGNOSIS — K21.9 GASTROESOPHAGEAL REFLUX DISEASE WITHOUT ESOPHAGITIS: ICD-10-CM

## 2020-08-05 LAB
ALBUMIN SERPL-MCNC: 4.3 G/DL (ref 3.4–5)
ALP SERPL-CCNC: 64 U/L (ref 40–150)
ALT SERPL W P-5'-P-CCNC: 29 U/L (ref 0–70)
ANION GAP SERPL CALCULATED.3IONS-SCNC: 1 MMOL/L (ref 3–14)
AST SERPL W P-5'-P-CCNC: 18 U/L (ref 0–45)
BILIRUB SERPL-MCNC: 0.9 MG/DL (ref 0.2–1.3)
BUN SERPL-MCNC: 14 MG/DL (ref 7–30)
CALCIUM SERPL-MCNC: 9.5 MG/DL (ref 8.5–10.1)
CHLORIDE SERPL-SCNC: 109 MMOL/L (ref 94–109)
CHOLEST SERPL-MCNC: 178 MG/DL
CO2 SERPL-SCNC: 33 MMOL/L (ref 20–32)
CREAT SERPL-MCNC: 0.99 MG/DL (ref 0.66–1.25)
ERYTHROCYTE [DISTWIDTH] IN BLOOD BY AUTOMATED COUNT: 13.2 % (ref 10–15)
GFR SERPL CREATININE-BSD FRML MDRD: 84 ML/MIN/{1.73_M2}
GLUCOSE SERPL-MCNC: 95 MG/DL (ref 70–99)
HCT VFR BLD AUTO: 42.5 % (ref 40–53)
HDLC SERPL-MCNC: 46 MG/DL
HGB BLD-MCNC: 13.9 G/DL (ref 13.3–17.7)
LDLC SERPL CALC-MCNC: 109 MG/DL
MCH RBC QN AUTO: 29.4 PG (ref 26.5–33)
MCHC RBC AUTO-ENTMCNC: 32.7 G/DL (ref 31.5–36.5)
MCV RBC AUTO: 90 FL (ref 78–100)
NONHDLC SERPL-MCNC: 132 MG/DL
PLATELET # BLD AUTO: 145 10E9/L (ref 150–450)
POTASSIUM SERPL-SCNC: 3.8 MMOL/L (ref 3.4–5.3)
PROT SERPL-MCNC: 7.3 G/DL (ref 6.8–8.8)
RBC # BLD AUTO: 4.72 10E12/L (ref 4.4–5.9)
SODIUM SERPL-SCNC: 143 MMOL/L (ref 133–144)
TRIGL SERPL-MCNC: 113 MG/DL
WBC # BLD AUTO: 5.3 10E9/L (ref 4–11)

## 2020-08-05 PROCEDURE — 36415 COLL VENOUS BLD VENIPUNCTURE: CPT | Performed by: PHYSICIAN ASSISTANT

## 2020-08-05 PROCEDURE — 80053 COMPREHEN METABOLIC PANEL: CPT | Performed by: PHYSICIAN ASSISTANT

## 2020-08-05 PROCEDURE — 80061 LIPID PANEL: CPT | Performed by: PHYSICIAN ASSISTANT

## 2020-08-05 PROCEDURE — 85027 COMPLETE CBC AUTOMATED: CPT | Performed by: PHYSICIAN ASSISTANT

## 2020-09-23 ENCOUNTER — OFFICE VISIT (OUTPATIENT)
Dept: ORTHOPEDICS | Facility: CLINIC | Age: 60
End: 2020-09-23
Payer: COMMERCIAL

## 2020-09-23 VITALS
SYSTOLIC BLOOD PRESSURE: 116 MMHG | DIASTOLIC BLOOD PRESSURE: 78 MMHG | BODY MASS INDEX: 25.77 KG/M2 | HEIGHT: 70 IN | WEIGHT: 180 LBS

## 2020-09-23 DIAGNOSIS — M17.12 PRIMARY OSTEOARTHRITIS OF LEFT KNEE: Primary | ICD-10-CM

## 2020-09-23 PROCEDURE — 20610 DRAIN/INJ JOINT/BURSA W/O US: CPT | Mod: LT | Performed by: ORTHOPAEDIC SURGERY

## 2020-09-23 RX ORDER — TRIAMCINOLONE ACETONIDE 40 MG/ML
40 INJECTION, SUSPENSION INTRA-ARTICULAR; INTRAMUSCULAR ONCE
Status: COMPLETED | OUTPATIENT
Start: 2020-09-23 | End: 2020-09-23

## 2020-09-23 RX ADMIN — TRIAMCINOLONE ACETONIDE 40 MG: 40 INJECTION, SUSPENSION INTRA-ARTICULAR; INTRAMUSCULAR at 13:45

## 2020-09-23 ASSESSMENT — MIFFLIN-ST. JEOR: SCORE: 1642.72

## 2020-09-23 NOTE — PROGRESS NOTES
Office Visit-Follow up    Chief Complaint: Benjamín Mendoza is a 60 year old male who is being seen for   Chief Complaint   Patient presents with     RECHECK     left knee primary osteoarthritis       History of Present Illness:   Today's visit:  At his last visit he had a intra-articular steroid injection.  He had X relief with the injection.  He continues to do well although certainly there is some discomfort returning.  He will be leaving for an [place] hunt coming up here shortly.  He would like repeat injection.    May 28, 2020 visit:  No new injuries.  Same pain is in the past.  Is requesting an injection.  December 18, 2019 visit:  Returns for medial sided knee pain.  Same pain is in the past.  Over the weekend he was working on his car.  No specific injuries.  However started having increasing pain.  Rates the pain is moderate to severe.  Causing him to limp.  Previous injections have been very helpful.  He is requesting an injection.  August 8, 2019 visit:  Returns for his left knee.  Pain along the medial side.  Previous injection has provided him relief.  He is been taken Tylenol ibuprofen for other issues.  He reports for a few weeks it was extremely flared up and he called for an appointment.  By the time he is able to get in the pain has improved.  Continues to have some discomfort medially.  Also complains of stiffness.  April 17, 2019 visit:   Returns for left medial knee pain.  Different pain than in the past.  Rates it is moderate to severe at times.  Describes it as aching and sharp.  Nonradiating.  No new injuries.  On his last visit he received an intra-articular injection which provided a little less than 6 weeks relief.  He is been taken ibuprofen.  He presents with his wife.     January 2, 2018 visit:  Returns for continued left medial knee achiness.  He reports the sharp pain from the meniscus tear preoperatively is gone.  Now more of a gradual achy soreness.  It is never significantly improved  "since his last visit.  It has actually worsened some.  Ibuprofen has not been helpful.  He rates it as mild to moderate at times.  It does cause him to limp at times.      Social History     Occupational History     Occupation: Elevator service     Comment: [employer]   Tobacco Use     Smoking status: Never Smoker     Smokeless tobacco: Never Used     Tobacco comment: no smokers in household   Substance and Sexual Activity     Alcohol use: No     Drug use: No     Sexual activity: Yes     Partners: Female     Birth control/protection: Surgical, Female Surgical     Comment: Wife had a tubal, 5 children       REVIEW OF SYSTEMS  General: negative for, night sweats, dizziness, fatigue  Resp: No shortness of breath and no cough  CV: negative for chest pain, syncope or near-syncope  GI: negative for nausea, vomiting and diarrhea  : negative for dysuria and hematuria  Musculoskeletal: as above  Neurologic: negative for syncope   Hematologic: negative for bleeding disorder    Physical Exam:  Vitals: /78   Ht 1.778 m (5' 10\")   Wt 81.6 kg (180 lb)   BMI 25.83 kg/m    BMI= Body mass index is 25.83 kg/m .  Constitutional: healthy, alert and no acute distress   Psychiatric: mentation appears normal and affect normal/bright  NEURO: no focal deficits  RESP: Normal with easy respirations and no use of accessory muscles to breathe, no audible wheezing or retractions  CV: LLE:  no edema         SKIN: No erythema, rashes, excoriation, or breakdown. No evidence of infection.   JOINT/EXTREMITIES:left knee: Varus deformity.  Tenderness along medial joint line.  Pseudolaxity with valgus stressing.  Collateral ligaments are intact.  Good muscle tone.  No peripheral edema.  GAIT: not tested           Diagnostic Modalities:  Previous imaging reviewed.  Independent visualization of the images was performed.      Impression: left knee primary osteoarthritis    Plan:  All of the above pertinent physical exam and imaging " modalities findings was reviewed with Benjamín.                                          INJECTION PROCEDURE:  The patient was counseled about an  injection, including discussion of risks (including infection), contents of the injection, rationale for performing the injection, and expected benefits of the injection. The skin was prepped with alcohol and betadine and then utilizing sterile technique an injection of the left knee joint from the anterolateral approach in the seated position was performed. The injection consisted 1ml of Kenalog (40mg per 1 ml) mixed with 3ml of 0.5% Marcaine. The patient tolerated the injection well, and there were no complications. The injection site was covered with a Band-Aid. The injection was performed by Randell Sosa, MYRTLE, CNP, DNP    Previous injection provided good relief.  Recommend repeat injection.  We discussed his upcoming elk hunt.  We discussed a good addition may be some walking sticks.    Return to clinic 6, week(s), PRN, or sooner as needed for changes.  Re-x-ray on return: No    Richard Blanco D.O.

## 2020-09-23 NOTE — PROGRESS NOTES
Clinic Administered Medication Documentation      Injection Documentation     Injection was administered by provider (please see MAR for given by information). Please see MAR and medication order for additional information.     Site: Joint injection   Medication Used: Kenolog    Expiration Date:      The following medication was given by MYRTLE Noble, CNP, DNP:     MEDICATION: Kenalog 40mg/1ml  ROUTE: Joint Injection  SITE: left knee  DOSE: 1 mL  LOT #: [id number]  : ContaAzul  EXPIRATION DATE:    NDC: [address]-2

## 2020-09-23 NOTE — LETTER
9/23/2020         RE: Benjamín Mendoza  [address] [facility] [address]        Dear Colleague,    Thank you for referring your patient, Benjamín Mendoza, to the [facility]. Please see a copy of my visit note below.    Office Visit-Follow up    Chief Complaint: Benjamín Mendoza is a 60 year old male who is being seen for   Chief Complaint   Patient presents with     RECHECK     left knee primary osteoarthritis       History of Present Illness:   Today's visit:  At his last visit he had a intra-articular steroid injection.  He had X relief with the injection.  He continues to do well although certainly there is some discomfort returning.  He will be leaving for an [employer] hunt coming up here shortly.  He would like repeat injection.    May 28, 2020 visit:  No new injuries.  Same pain is in the past.  Is requesting an injection.  December 18, 2019 visit:  Returns for medial sided knee pain.  Same pain is in the past.  Over the weekend he was working on his car.  No specific injuries.  However started having increasing pain.  Rates the pain is moderate to severe.  Causing him to limp.  Previous injections have been very helpful.  He is requesting an injection.  August 8, 2019 visit:  Returns for his left knee.  Pain along the medial side.  Previous injection has provided him relief.  He is been taken Tylenol ibuprofen for other issues.  He reports for a few weeks it was extremely flared up and he called for an appointment.  By the time he is able to get in the pain has improved.  Continues to have some discomfort medially.  Also complains of stiffness.  April 17, 2019 visit:   Returns for left medial knee pain.  Different pain than in the past.  Rates it is moderate to severe at times.  Describes it as aching and sharp.  Nonradiating.  No new injuries.  On his last visit he received an intra-articular injection which provided a little less than 6 weeks relief.  He is been taken ibuprofen.  He presents  "with his wife.     January 2, 2018 visit:  Returns for continued left medial knee achiness.  He reports the sharp pain from the meniscus tear preoperatively is gone.  Now more of a gradual achy soreness.  It is never significantly improved since his last visit.  It has actually worsened some.  Ibuprofen has not been helpful.  He rates it as mild to moderate at times.  It does cause him to limp at times.      Social History     Occupational History     Occupation: Elevator service     Comment: [employer]   Tobacco Use     Smoking status: Never Smoker     Smokeless tobacco: Never Used     Tobacco comment: no smokers in household   Substance and Sexual Activity     Alcohol use: No     Drug use: No     Sexual activity: Yes     Partners: Female     Birth control/protection: Surgical, Female Surgical     Comment: Wife had a tubal, 5 children       REVIEW OF SYSTEMS  General: negative for, night sweats, dizziness, fatigue  Resp: No shortness of breath and no cough  CV: negative for chest pain, syncope or near-syncope  GI: negative for nausea, vomiting and diarrhea  : negative for dysuria and hematuria  Musculoskeletal: as above  Neurologic: negative for syncope   Hematologic: negative for bleeding disorder    Physical Exam:  Vitals: /78   Ht 1.778 m (5' 10\")   Wt 81.6 kg (180 lb)   BMI 25.83 kg/m    BMI= Body mass index is 25.83 kg/m .  Constitutional: healthy, alert and no acute distress   Psychiatric: mentation appears normal and affect normal/bright  NEURO: no focal deficits  RESP: Normal with easy respirations and no use of accessory muscles to breathe, no audible wheezing or retractions  CV: LLE:  no edema         SKIN: No erythema, rashes, excoriation, or breakdown. No evidence of infection.   JOINT/EXTREMITIES:left knee: Varus deformity.  Tenderness along medial joint line.  Pseudolaxity with valgus stressing.  Collateral ligaments are intact.  Good muscle tone.  No peripheral edema.  GAIT: not " tested           Diagnostic Modalities:  Previous imaging reviewed.  Independent visualization of the images was performed.      Impression: left knee primary osteoarthritis    Plan:  All of the above pertinent physical exam and imaging modalities findings was reviewed with Benjamín.                                          INJECTION PROCEDURE:  The patient was counseled about an  injection, including discussion of risks (including infection), contents of the injection, rationale for performing the injection, and expected benefits of the injection. The skin was prepped with alcohol and betadine and then utilizing sterile technique an injection of the left knee joint from the anterolateral approach in the seated position was performed. The injection consisted 1ml of Kenalog (40mg per 1 ml) mixed with 3ml of 0.5% Marcaine. The patient tolerated the injection well, and there were no complications. The injection site was covered with a Band-Aid. The injection was performed by MYRTLE Osorio, CNP, DNP    Previous injection provided good relief.  Recommend repeat injection.  We discussed his upcoming [employer] hunt.  We discussed a good addition may be some walking sticks.    Return to clinic 6, week(s), PRN, or sooner as needed for changes.  Re-x-ray on return: Jeannie Blanco D.O.          Clinic Administered Medication Documentation      Injection Documentation     Injection was administered by provider (please see MAR for given by information). Please see MAR and medication order for additional information.     Site: Joint injection   Medication Used: Kenolog    Expiration Date:      The following medication was given by { :[id number]}:     MEDICATION: Kenalog 40mg/1ml  ROUTE: Joint Injection  SITE: left knee  DOSE: 1 mL  LOT #: [id number]  : [employer]  EXPIRATION DATE:    NDC: [address]-7                        Again, thank you for allowing me to participate in the care of your patient.         Sincerely,        Dane Blanco, DO

## 2020-12-01 ENCOUNTER — TRANSFERRED RECORDS (OUTPATIENT)
Dept: HEALTH INFORMATION MANAGEMENT | Facility: CLINIC | Age: 60
End: 2020-12-01

## 2020-12-28 ENCOUNTER — MYC MEDICAL ADVICE (OUTPATIENT)
Dept: FAMILY MEDICINE | Facility: OTHER | Age: 60
End: 2020-12-28

## 2020-12-28 ENCOUNTER — ANCILLARY PROCEDURE (OUTPATIENT)
Dept: GENERAL RADIOLOGY | Facility: OTHER | Age: 60
End: 2020-12-28
Attending: NURSE PRACTITIONER
Payer: COMMERCIAL

## 2020-12-28 ENCOUNTER — VIRTUAL VISIT (OUTPATIENT)
Dept: FAMILY MEDICINE | Facility: OTHER | Age: 60
End: 2020-12-28
Payer: COMMERCIAL

## 2020-12-28 DIAGNOSIS — G89.4 CHRONIC PAIN SYNDROME: ICD-10-CM

## 2020-12-28 DIAGNOSIS — K59.00 CONSTIPATION, UNSPECIFIED CONSTIPATION TYPE: ICD-10-CM

## 2020-12-28 DIAGNOSIS — G89.4 CHRONIC PAIN SYNDROME: Primary | ICD-10-CM

## 2020-12-28 PROCEDURE — 74019 RADEX ABDOMEN 2 VIEWS: CPT | Performed by: RADIOLOGY

## 2020-12-28 PROCEDURE — 99214 OFFICE O/P EST MOD 30 MIN: CPT | Mod: 95 | Performed by: NURSE PRACTITIONER

## 2020-12-28 NOTE — TELEPHONE ENCOUNTER
Patient scheduled and had appt.     Closing encounter.     Dexter Gavin MA on 12/28/2020 at 2:28 PM

## 2020-12-28 NOTE — PROGRESS NOTES
"Benjamín Mendoza is a 60 year old male who is being evaluated via a billable telephone visit.      The patient has been notified of following:     \"This telephone visit will be conducted via a call between you and your physician/provider. We have found that certain health care needs can be provided without the need for a physical exam.  This service lets us provide the care you need with a short phone conversation.  If a prescription is necessary we can send it directly to your pharmacy.  If lab work is needed we can place an order for that and you can then stop by our lab to have the test done at a later time.    Telephone visits are billed at different rates depending on your insurance coverage. During this emergency period, for some insurers they may be billed the same as an in-person visit.  Please reach out to your insurance provider with any questions.    If during the course of the call the physician/provider feels a telephone visit is not appropriate, you will not be charged for this service.\"    Patient has given verbal consent for Telephone visit?  Yes    What phone number would you like to be contacted at? [phone]    How would you like to obtain your AVS? MyChart    Subjective     Benjamín Mendoza is a 60 year old male who presents via phone visit today for the following health issues:    HPI     Constipation  Onset/Duration: ongoing but methadone has made it worse   Description:  Frequency of bowel movements: 7-12 days   Consistency of stool: doesn't feel like he is emptying, soft   Progression of Symptoms: worsening  Accompanying signs and symptoms:    Abdominal pain: Cramping, not active abdominal pain from being full.    Rectal pain: no   Blood in stool: no   Nausea/Vomiting: If he does not go for awhile he will have some nausea.    Weight loss or gain: no  History:   Similar problems in past: YES  History of abdominal surgery: no  Chronic laxative use: YES  New medications: no  Precipitating or " alleviating factors:   Therapies tried and outcome: 2 stool softeners     Chronic restless leg and uses the methadone.   Chronic pain specialist.   Been on that for a year.   Currently on Senne and Colace, in the beginning this was working well. The past two months every week has been a hein to go to the bathroom. Drinking lots of water, has lost some weight from eating better.               Review of Systems          Objective          Vitals:  No vitals were obtained today due to virtual visit.    healthy, alert and no distress  PSYCH: Alert and oriented times 3; coherent speech, normal   rate and volume, able to articulate logical thoughts, able   to abstract reason, no tangential thoughts, no hallucinations   or delusions  His affect is normal  RESP: No cough, no audible wheezing, able to talk in full sentences  Remainder of exam unable to be completed due to telephone visits    No results found for this or any previous visit (from the past 24 hour(s)).        Assessment/Plan:    Assessment & Plan     Chronic pain syndrome  - Patient started on Methadone for restless leg per pain specialist. Been on it for about a year.   - Has noticed some constipation with medication, started on Senne and colace, worked at first now seeing increase in periods where he is constipated and not going for days. He denies any abdominal pain, has some cramping and bloating. Denies any nausea or vomiting.   - Reports no fevers or chills.   - See below.   - XR Abdomen 2 Views; Future    Constipation, unspecified constipation type  - Recommend abdominal xray today, he will go to [place] to have this done.   - Recommend bowel clean out as long as Xray shows stool and no other possible causes.   - Recommend switching senokot to Miralax daily with colace or doing mirlax as needed with current regimen. I did encourage him to reach out to his pain specialist as well.   - Continue with good fiber intake and water to help as well.   - XR  Abdomen 2 Views; Future              The patient indicates understanding of these issues and agrees with the plan.    Patient Instructions   Start a clear liquid diet after breakfast.  A clear liquid diet consists of soda, juices without pulp, broth, Jell-O, Popsicles, Italian ice, hard candies (if age appropriate).  Pretty much anything you can see through!!  (NO dairy products or solid food.)    You will need:  1. 32 or 64 oz. of flavored Pedialyte or Gatorade (See Below)  2. One 255 gram bottle of Miralax  3. 2 or 3 bisacodyl (Dulcolax) tablets     These are all available without prescription.      Around 12 Noon on the day of the clean out, mix the entire container of Miralax (255 gr) in 64 oz. (or half a container in 32 ounces) of Pedialyte or  Gatorade. Leave this Miralax mixture in the refrigerator for one hour to help the Miralax dissolve, and to help the mixture taste better.  Note, the dose we re suggesting is for a bowel  cleanout.   It is not the dose that is written on the bottle, which is designed for daily softening of stool.  We need this higher dose so that the cleanout will work.      Drink 8-12 oz. of the MiraLax-electrolyte solution mixture every 15-20 minutes until the entire 64 oz mixture is consumed.  It is very important to drink all 64 oz of the MiraLax-electrolyte solution.     Within 30 min of finishing the MiraLax-electrolyte solution mixture, take the 3 bisacodyl (Dulcolax) tablets with 8-12 oz. of clear liquid (these tabs can be crushed).  (Note that the package instructions may direct not to take more than two tablets at a time, but for this preparation take three).       - Xray today at [facility]  - Start clean out once Xray is back and ok   - After bowel clean out start Miralax daily instead of the senne, reach out to pain management so they are aware.       MYRTLE Carter CNP  Questions or concerns please feel free to send me a [employer] message or call me  Phone  : [phone]          Return in about 2 weeks (around 1/11/2021) for recheck symptoms.    MYRTLE Carter [facility]    Phone call duration:  17 minutes

## 2020-12-28 NOTE — PATIENT INSTRUCTIONS
Start a clear liquid diet after breakfast.  A clear liquid diet consists of soda, juices without pulp, broth, Jell-O, Popsicles, Italian ice, hard candies (if age appropriate).  Pretty much anything you can see through!!  (NO dairy products or solid food.)    You will need:  1. 32 or 64 oz. of flavored Pedialyte or Gatorade (See Below)  2. One 255 gram bottle of Miralax  3. 2 or 3 bisacodyl (Dulcolax) tablets     These are all available without prescription.      Around 12 Noon on the day of the clean out, mix the entire container of Miralax (255 gr) in 64 oz. (or half a container in 32 ounces) of Pedialyte or  Gatorade. Leave this Miralax mixture in the refrigerator for one hour to help the Miralax dissolve, and to help the mixture taste better.  Note, the dose we re suggesting is for a bowel  cleanout.   It is not the dose that is written on the bottle, which is designed for daily softening of stool.  We need this higher dose so that the cleanout will work.      Drink 8-12 oz. of the MiraLax-electrolyte solution mixture every 15-20 minutes until the entire 64 oz mixture is consumed.  It is very important to drink all 64 oz of the MiraLax-electrolyte solution.     Within 30 min of finishing the MiraLax-electrolyte solution mixture, take the 3 bisacodyl (Dulcolax) tablets with 8-12 oz. of clear liquid (these tabs can be crushed).  (Note that the package instructions may direct not to take more than two tablets at a time, but for this preparation take three).       - Xray today at [facility]  - Start clean out once Xray is back and ok   - After bowel clean out start Miralax daily instead of the senne, reach out to pain management so they are aware.       MYRTLE Carter CNP  Questions or concerns please feel free to send me a [email or web address] message or call me  Phone : [phone]

## 2021-01-09 ENCOUNTER — HEALTH MAINTENANCE LETTER (OUTPATIENT)
Age: 61
End: 2021-01-09

## 2021-01-28 ENCOUNTER — OFFICE VISIT (OUTPATIENT)
Dept: ORTHOPEDICS | Facility: CLINIC | Age: 61
End: 2021-01-28
Payer: COMMERCIAL

## 2021-01-28 VITALS — BODY MASS INDEX: 25.05 KG/M2 | HEIGHT: 70 IN | WEIGHT: 175 LBS

## 2021-01-28 DIAGNOSIS — M17.12 PRIMARY OSTEOARTHRITIS OF LEFT KNEE: Primary | ICD-10-CM

## 2021-01-28 PROCEDURE — 20610 DRAIN/INJ JOINT/BURSA W/O US: CPT | Mod: LT | Performed by: ORTHOPAEDIC SURGERY

## 2021-01-28 RX ORDER — TRIAMCINOLONE ACETONIDE 40 MG/ML
40 INJECTION, SUSPENSION INTRA-ARTICULAR; INTRAMUSCULAR ONCE
Status: COMPLETED | OUTPATIENT
Start: 2021-01-28 | End: 2021-01-28

## 2021-01-28 RX ADMIN — TRIAMCINOLONE ACETONIDE 40 MG: 40 INJECTION, SUSPENSION INTRA-ARTICULAR; INTRAMUSCULAR at 14:10

## 2021-01-28 ASSESSMENT — MIFFLIN-ST. JEOR: SCORE: 1620.04

## 2021-01-28 NOTE — LETTER
1/28/2021         RE: Benjamín Mendoza  [address] [facility] [address]        Dear Colleague,    Thank you for referring your patient, Benjamín Mendoza, to the [facility]. Please see a copy of my visit note below.    Office Visit-Follow up    Chief Complaint: Benjamín Mendoza is a 60 year old male who is being seen for   Chief Complaint   Patient presents with     RECHECK     left knee primary osteoarthritis       History of Present Illness:   Today's visit:  Did well with his last injection.  To provide extra relief.  Few days ago started having return of symptoms.  Medial sided.  Nonradiating.  No new injuries or traumas.  He like an injection    September 23, 2020 visit:  At his last visit he had a intra-articular steroid injection.  He had X relief with the injection.  He continues to do well although certainly there is some discomfort returning.  He will be leaving for an [employer] hunt coming up here shortly.  He would like repeat injection.     May 28, 2020 visit:  No new injuries.  Same pain is in the past.  Is requesting an injection.  December 18, 2019 visit:  Returns for medial sided knee pain.  Same pain is in the past.  Over the weekend he was working on his car.  No specific injuries.  However started having increasing pain.  Rates the pain is moderate to severe.  Causing him to limp.  Previous injections have been very helpful.  He is requesting an injection.  August 8, 2019 visit:  Returns for his left knee.  Pain along the medial side.  Previous injection has provided him relief.  He is been taken Tylenol ibuprofen for other issues.  He reports for a few weeks it was extremely flared up and he called for an appointment.  By the time he is able to get in the pain has improved.  Continues to have some discomfort medially.  Also complains of stiffness.  April 17, 2019 visit:   Returns for left medial knee pain.  Different pain than in the past.  Rates it is moderate to severe at  "times.  Describes it as aching and sharp.  Nonradiating.  No new injuries.  On his last visit he received an intra-articular injection which provided a little less than 6 weeks relief.  He is been taken ibuprofen.  He presents with his wife.     January 2, 2018 visit:  Returns for continued left medial knee achiness.  He reports the sharp pain from the meniscus tear preoperatively is gone.  Now more of a gradual achy soreness.  It is never significantly improved since his last visit.  It has actually worsened some.  Ibuprofen has not been helpful.  He rates it as mild to moderate at times.  It does cause him to limp at times.      Social History     Occupational History     Occupation: Elevator service     Comment: [employer]   Tobacco Use     Smoking status: Never Smoker     Smokeless tobacco: Never Used     Tobacco comment: no smokers in household   Substance and Sexual Activity     Alcohol use: No     Drug use: No     Sexual activity: Yes     Partners: Female     Birth control/protection: Surgical, Female Surgical     Comment: Wife had a tubal, 5 children       REVIEW OF SYSTEMS  General: negative for, night sweats, dizziness, fatigue  Resp: No shortness of breath and no cough  CV: negative for chest pain, syncope or near-syncope  GI: negative for nausea, vomiting and diarrhea  : negative for dysuria and hematuria  Musculoskeletal: as above  Neurologic: negative for syncope   Hematologic: negative for bleeding disorder    Physical Exam:  Vitals: Ht 1.778 m (5' 10\")   Wt 79.4 kg (175 lb)   BMI 25.11 kg/m    BMI= Body mass index is 25.11 kg/m .  Constitutional: healthy, alert and no acute distress   Psychiatric: mentation appears normal and affect normal/bright  NEURO: no focal deficits  RESP: Normal with easy respirations and no use of accessory muscles to breathe, no audible wheezing or retractions  CV: LLE:  no edema           SKIN: No erythema, rashes, excoriation, or breakdown. No evidence of " infection.   JOINT/EXTREMITIES:left knee: Varus alignment.  Tenderness along medial joint line.  Small effusion.  Pseudolaxity medial valgus stressing.  Collateral insert intact  GAIT: not tested             Diagnostic Modalities:  None today.  Independent visualization of the images was performed.      Impression: left knee primary osteoarthritis    Plan:  All of the above pertinent physical exam and imaging modalities findings was reviewed with Benjamín.                                          INJECTION PROCEDURE:  The patient was counseled about an  injection, including discussion of risks (including infection), contents of the injection, rationale for performing the injection, and expected benefits of the injection. The skin was prepped with alcohol and betadine and then utilizing sterile technique an injection of the left knee joint from the anterolateral approach in the seated position was performed. The injection consisted 1ml of Kenalog (40mg per 1 ml) mixed with 3ml of 0.5% Marcaine. The patient tolerated the injection well, and there were no complications. The injection site was covered with a Band-Aid. The injection was performed by MYRTLE Osorio CNP, DNP    Previous injection provided good relief.  Recommend repeat injection.    Return to clinic 4-6 , week(s), PRN, or sooner as needed for changes.  Re-x-ray on return: Jeannie Blanco D.O.          Clinic Administered Medication Documentation      Injection Documentation     Injection was administered by provider (please see MAR for given by information). Please see MAR and medication order for additional information.     Site: Joint injection   Medication Used: Kenalog    Expiration Date:         The following medication was given by MYRTLE Noble CNP, DNP:     MEDICATION: Kenalog 40mg/1ml  ROUTE: Joint Injection  SITE: left knee  DOSE: 1 mL  LOT #: [id number]  : [employer]  EXPIRATION DATE:    NDC:  [address]-3                    Again, thank you for allowing me to participate in the care of your patient.        Sincerely,        Dane Blanco, DO

## 2021-01-28 NOTE — PROGRESS NOTES
Clinic Administered Medication Documentation      Injection Documentation     Injection was administered by provider (please see MAR for given by information). Please see MAR and medication order for additional information.     Site: Joint injection   Medication Used: Kenalog    Expiration Date:         The following medication was given by MYRTLE Noble, CNP, DNP:     MEDICATION: Kenalog 40mg/1ml  ROUTE: Joint Injection  SITE: left knee  DOSE: 1 mL  LOT #: [id number]  : [employer]  EXPIRATION DATE:    NDC: [address]-9

## 2021-01-28 NOTE — PROGRESS NOTES
Office Visit-Follow up    Chief Complaint: Benjamín Mendoza is a 60 year old male who is being seen for   Chief Complaint   Patient presents with     RECHECK     left knee primary osteoarthritis       History of Present Illness:   Today's visit:  Did well with his last injection.  To provide extra relief.  Few days ago started having return of symptoms.  Medial sided.  Nonradiating.  No new injuries or traumas.  He like an injection    September 23, 2020 visit:  At his last visit he had a intra-articular steroid injection.  He had X relief with the injection.  He continues to do well although certainly there is some discomfort returning.  He will be leaving for an [place] coming up here shortly.  He would like repeat injection.     May 28, 2020 visit:  No new injuries.  Same pain is in the past.  Is requesting an injection.  December 18, 2019 visit:  Returns for medial sided knee pain.  Same pain is in the past.  Over the weekend he was working on his car.  No specific injuries.  However started having increasing pain.  Rates the pain is moderate to severe.  Causing him to limp.  Previous injections have been very helpful.  He is requesting an injection.  August 8, 2019 visit:  Returns for his left knee.  Pain along the medial side.  Previous injection has provided him relief.  He is been taken Tylenol ibuprofen for other issues.  He reports for a few weeks it was extremely flared up and he called for an appointment.  By the time he is able to get in the pain has improved.  Continues to have some discomfort medially.  Also complains of stiffness.  April 17, 2019 visit:   Returns for left medial knee pain.  Different pain than in the past.  Rates it is moderate to severe at times.  Describes it as aching and sharp.  Nonradiating.  No new injuries.  On his last visit he received an intra-articular injection which provided a little less than 6 weeks relief.  He is been taken ibuprofen.  He presents with his  none "wife.     January 2, 2018 visit:  Returns for continued left medial knee achiness.  He reports the sharp pain from the meniscus tear preoperatively is gone.  Now more of a gradual achy soreness.  It is never significantly improved since his last visit.  It has actually worsened some.  Ibuprofen has not been helpful.  He rates it as mild to moderate at times.  It does cause him to limp at times.      Social History     Occupational History     Occupation: Elevator service     Comment: [employer]   Tobacco Use     Smoking status: Never Smoker     Smokeless tobacco: Never Used     Tobacco comment: no smokers in household   Substance and Sexual Activity     Alcohol use: No     Drug use: No     Sexual activity: Yes     Partners: Female     Birth control/protection: Surgical, Female Surgical     Comment: Wife had a tubal, 5 children       REVIEW OF SYSTEMS  General: negative for, night sweats, dizziness, fatigue  Resp: No shortness of breath and no cough  CV: negative for chest pain, syncope or near-syncope  GI: negative for nausea, vomiting and diarrhea  : negative for dysuria and hematuria  Musculoskeletal: as above  Neurologic: negative for syncope   Hematologic: negative for bleeding disorder    Physical Exam:  Vitals: Ht 1.778 m (5' 10\")   Wt 79.4 kg (175 lb)   BMI 25.11 kg/m    BMI= Body mass index is 25.11 kg/m .  Constitutional: healthy, alert and no acute distress   Psychiatric: mentation appears normal and affect normal/bright  NEURO: no focal deficits  RESP: Normal with easy respirations and no use of accessory muscles to breathe, no audible wheezing or retractions  CV: LLE:  no edema           SKIN: No erythema, rashes, excoriation, or breakdown. No evidence of infection.   JOINT/EXTREMITIES:left knee: Varus alignment.  Tenderness along medial joint line.  Small effusion.  Pseudolaxity medial valgus stressing.  Collateral insert intact  GAIT: not tested             Diagnostic Modalities:  None " today.  Independent visualization of the images was performed.      Impression: left knee primary osteoarthritis    Plan:  All of the above pertinent physical exam and imaging modalities findings was reviewed with Benjamín.                                          INJECTION PROCEDURE:  The patient was counseled about an  injection, including discussion of risks (including infection), contents of the injection, rationale for performing the injection, and expected benefits of the injection. The skin was prepped with alcohol and betadine and then utilizing sterile technique an injection of the left knee joint from the anterolateral approach in the seated position was performed. The injection consisted 1ml of Kenalog (40mg per 1 ml) mixed with 3ml of 0.5% Marcaine. The patient tolerated the injection well, and there were no complications. The injection site was covered with a Band-Aid. The injection was performed by MYRTLE Osorio, CNP, DNP    Previous injection provided good relief.  Recommend repeat injection.    Return to clinic 4-6 , week(s), PRN, or sooner as needed for changes.  Re-x-ray on return: No    Richard Blanco D.O.

## 2021-06-04 ENCOUNTER — MYC MEDICAL ADVICE (OUTPATIENT)
Dept: FAMILY MEDICINE | Facility: OTHER | Age: 61
End: 2021-06-04

## 2021-06-04 DIAGNOSIS — E78.5 HYPERLIPIDEMIA, UNSPECIFIED HYPERLIPIDEMIA TYPE: ICD-10-CM

## 2021-06-04 DIAGNOSIS — K21.9 GASTROESOPHAGEAL REFLUX DISEASE WITHOUT ESOPHAGITIS: ICD-10-CM

## 2021-06-04 RX ORDER — ATORVASTATIN CALCIUM 40 MG/1
40 TABLET, FILM COATED ORAL DAILY
Qty: 90 TABLET | Refills: 0 | Status: SHIPPED | OUTPATIENT
Start: 2021-06-04 | End: 2021-09-21

## 2021-06-04 NOTE — TELEPHONE ENCOUNTER
Pharmacy did not have the final refill for patient- refill sent.  Rajni Zhang RN on 6/4/2021 at 1:55 PM

## 2021-08-16 ENCOUNTER — ANCILLARY PROCEDURE (OUTPATIENT)
Dept: GENERAL RADIOLOGY | Facility: CLINIC | Age: 61
End: 2021-08-16
Attending: ORTHOPAEDIC SURGERY
Payer: COMMERCIAL

## 2021-08-16 ENCOUNTER — OFFICE VISIT (OUTPATIENT)
Dept: ORTHOPEDICS | Facility: CLINIC | Age: 61
End: 2021-08-16
Payer: COMMERCIAL

## 2021-08-16 VITALS
DIASTOLIC BLOOD PRESSURE: 80 MMHG | SYSTOLIC BLOOD PRESSURE: 100 MMHG | WEIGHT: 167.7 LBS | HEIGHT: 70 IN | BODY MASS INDEX: 24.01 KG/M2

## 2021-08-16 DIAGNOSIS — M17.12 PRIMARY OSTEOARTHRITIS OF LEFT KNEE: Primary | ICD-10-CM

## 2021-08-16 DIAGNOSIS — M17.12 PRIMARY OSTEOARTHRITIS OF LEFT KNEE: ICD-10-CM

## 2021-08-16 PROCEDURE — 99213 OFFICE O/P EST LOW 20 MIN: CPT | Mod: 25 | Performed by: ORTHOPAEDIC SURGERY

## 2021-08-16 PROCEDURE — 73564 X-RAY EXAM KNEE 4 OR MORE: CPT | Mod: TC | Performed by: RADIOLOGY

## 2021-08-16 PROCEDURE — 20610 DRAIN/INJ JOINT/BURSA W/O US: CPT | Mod: LT | Performed by: ORTHOPAEDIC SURGERY

## 2021-08-16 RX ORDER — BUPIVACAINE HYDROCHLORIDE 5 MG/ML
3 INJECTION, SOLUTION PERINEURAL ONCE
Status: COMPLETED | OUTPATIENT
Start: 2021-08-16 | End: 2021-08-16

## 2021-08-16 RX ORDER — TRIAMCINOLONE ACETONIDE 40 MG/ML
40 INJECTION, SUSPENSION INTRA-ARTICULAR; INTRAMUSCULAR ONCE
Status: COMPLETED | OUTPATIENT
Start: 2021-08-16 | End: 2021-08-16

## 2021-08-16 RX ADMIN — TRIAMCINOLONE ACETONIDE 40 MG: 40 INJECTION, SUSPENSION INTRA-ARTICULAR; INTRAMUSCULAR at 08:49

## 2021-08-16 RX ADMIN — BUPIVACAINE HYDROCHLORIDE 15 MG: 5 INJECTION, SOLUTION PERINEURAL at 08:49

## 2021-08-16 ASSESSMENT — PAIN SCALES - GENERAL: PAINLEVEL: MILD PAIN (2)

## 2021-08-16 ASSESSMENT — MIFFLIN-ST. JEOR: SCORE: 1581.93

## 2021-08-16 NOTE — LETTER
8/16/2021         RE: Benjamín Mendoza  [address] [facility] [place] [address]        Dear Colleague,    Thank you for referring your patient, Benjamín Mendoza, to the [facility]. Please see a copy of my visit note below.    Office Visit-Follow up    Chief Complaint: Benjamín Mendoza is a 61 year old male who is being seen for   Chief Complaint   Patient presents with     RECHECK     left knee primary osteoarthritis       History of Present Illness:   Today's visit:  Overall doing fairly well.  He does have some achiness and soreness medially.  He has been staying very active.  He has been on the treadmill with a backpack getting ready for upcoming elk hunt.      January 28, 2021 visit:  Did well with his last injection.  To provide extra relief.  Few days ago started having return of symptoms.  Medial sided.  Nonradiating.  No new injuries or traumas.  He like an injection     September 23, 2020 visit:  At his last visit he had a intra-articular steroid injection.  He had X relief with the injection.  He continues to do well although certainly there is some discomfort returning.  He will be leaving for an elk hunt coming up here shortly.  He would like repeat injection.     May 28, 2020 visit:  No new injuries.  Same pain is in the past.  Is requesting an injection.  December 18, 2019 visit:  Returns for medial sided knee pain.  Same pain is in the past.  Over the weekend he was working on his car.  No specific injuries.  However started having increasing pain.  Rates the pain is moderate to severe.  Causing him to limp.  Previous injections have been very helpful.  He is requesting an injection.  August 8, 2019 visit:  Returns for his left knee.  Pain along the medial side.  Previous injection has provided him relief.  He is been taken Tylenol ibuprofen for other issues.  He reports for a few weeks it was extremely flared up and he called for an appointment.  By the time he is able to get in  "the pain has improved.  Continues to have some discomfort medially.  Also complains of stiffness.  April 17, 2019 visit:   Returns for left medial knee pain.  Different pain than in the past.  Rates it is moderate to severe at times.  Describes it as aching and sharp.  Nonradiating.  No new injuries.  On his last visit he received an intra-articular injection which provided a little less than 6 weeks relief.  He is been taken ibuprofen.  He presents with his wife.     January 2, 2018 visit:  Returns for continued left medial knee achiness.  He reports the sharp pain from the meniscus tear preoperatively is gone.  Now more of a gradual achy soreness.  It is never significantly improved since his last visit.  It has actually worsened some.  Ibuprofen has not been helpful.  He rates it as mild to moderate at times.  It does cause him to limp at times.         Social History     Occupational History     Occupation: Elevator service     Comment: [email or web address]   Tobacco Use     Smoking status: Never Smoker     Smokeless tobacco: Never Used     Tobacco comment: no smokers in household   Substance and Sexual Activity     Alcohol use: No     Drug use: No     Sexual activity: Yes     Partners: Female     Birth control/protection: Surgical, Female Surgical     Comment: Wife had a tubal, 5 children       REVIEW OF SYSTEMS  General: negative for, night sweats, dizziness, fatigue  Resp: No shortness of breath and no cough  CV: negative for chest pain, syncope or near-syncope  GI: negative for nausea, vomiting and diarrhea  : negative for dysuria and hematuria  Musculoskeletal: as above  Neurologic: negative for syncope   Hematologic: negative for bleeding disorder    Physical Exam:  Vitals: /80   Ht 1.778 m (5' 10\")   Wt 76.1 kg (167 lb 11.2 oz)   BMI 24.06 kg/m    BMI= Body mass index is 24.06 kg/m .  Constitutional: healthy, alert and no acute distress   Psychiatric: mentation appears normal and affect " normal/bright  NEURO: no focal deficits  RESP: Normal with easy respirations and no use of accessory muscles to breathe, no audible wheezing or retractions  CV: LLE:  no edema           SKIN: No erythema, rashes, excoriation, or breakdown. No evidence of infection.   JOINT/EXTREMITIES: Left knee: Varus alignment.  Pseudolaxity medial valgus stressing.  Active motion 2-120 degrees.  Patella tracks midline.  Good muscle tone.    GAIT: not tested             Diagnostic Modalities:  left knee X-ray: varus alignment medial compartment:  with bone on bone wear, subchondral cysts, subchondral sclerosis lateral compartment:  no significant joint space narrowing patellofemoral compartment:  with minimal joint space narrowing, osteophytes  Independent visualization of the images was performed.      Impression: left knee primary osteoarthritis    Plan:  All of the above pertinent physical exam and imaging modalities findings was reviewed with Benjamín.                                          INJECTION PROCEDURE:  The patient was counseled about an  injection, including discussion of risks (including infection), contents of the injection, rationale for performing the injection, and expected benefits of the injection. The skin was prepped with alcohol and betadine and then utilizing sterile technique an injection of the left knee joint from the anterolateral approach in the seated position was performed. The injection consisted 1ml of Kenalog (40mg per 1 ml) mixed with 3ml of 0.5% Marcaine. The patient tolerated the injection well, and there were no complications. The injection site was covered with a Band-Aid. The injection was performed by Randell Sosa, APRN, CNP, DNP    Options discussed.  Plan to proceed with steroid injection.  I wished him luck on his upcoming solo [facility] elk hunt.    Return to clinic 6, week(s), PRN, or sooner as needed for changes.  Re-x-ray on return: No    Richard Blanco D.O.            Again, thank you for  allowing me to participate in the care of your patient.        Sincerely,        Dane Blanco, DO

## 2021-08-16 NOTE — PROGRESS NOTES
Office Visit-Follow up    Chief Complaint: Benjamín Mendoza is a 61 year old male who is being seen for   Chief Complaint   Patient presents with     RECHECK     left knee primary osteoarthritis       History of Present Illness:   Today's visit:  Overall doing fairly well.  He does have some achiness and soreness medially.  He has been staying very active.  He has been on the treadmill with a backpack getting ready for upcoming elk hunt.      January 28, 2021 visit:  Did well with his last injection.  To provide extra relief.  Few days ago started having return of symptoms.  Medial sided.  Nonradiating.  No new injuries or traumas.  He like an injection     September 23, 2020 visit:  At his last visit he had a intra-articular steroid injection.  He had X relief with the injection.  He continues to do well although certainly there is some discomfort returning.  He will be leaving for an elk hunt coming up here shortly.  He would like repeat injection.     May 28, 2020 visit:  No new injuries.  Same pain is in the past.  Is requesting an injection.  December 18, 2019 visit:  Returns for medial sided knee pain.  Same pain is in the past.  Over the weekend he was working on his car.  No specific injuries.  However started having increasing pain.  Rates the pain is moderate to severe.  Causing him to limp.  Previous injections have been very helpful.  He is requesting an injection.  August 8, 2019 visit:  Returns for his left knee.  Pain along the medial side.  Previous injection has provided him relief.  He is been taken Tylenol ibuprofen for other issues.  He reports for a few weeks it was extremely flared up and he called for an appointment.  By the time he is able to get in the pain has improved.  Continues to have some discomfort medially.  Also complains of stiffness.  April 17, 2019 visit:   Returns for left medial knee pain.  Different pain than in the past.  Rates it is moderate to severe at times.  Describes it as  "aching and sharp.  Nonradiating.  No new injuries.  On his last visit he received an intra-articular injection which provided a little less than 6 weeks relief.  He is been taken ibuprofen.  He presents with his wife.     January 2, 2018 visit:  Returns for continued left medial knee achiness.  He reports the sharp pain from the meniscus tear preoperatively is gone.  Now more of a gradual achy soreness.  It is never significantly improved since his last visit.  It has actually worsened some.  Ibuprofen has not been helpful.  He rates it as mild to moderate at times.  It does cause him to limp at times.         Social History     Occupational History     Occupation: Elevator service     Comment: [email or web address]   Tobacco Use     Smoking status: Never Smoker     Smokeless tobacco: Never Used     Tobacco comment: no smokers in household   Substance and Sexual Activity     Alcohol use: No     Drug use: No     Sexual activity: Yes     Partners: Female     Birth control/protection: Surgical, Female Surgical     Comment: Wife had a tubal, 5 children       REVIEW OF SYSTEMS  General: negative for, night sweats, dizziness, fatigue  Resp: No shortness of breath and no cough  CV: negative for chest pain, syncope or near-syncope  GI: negative for nausea, vomiting and diarrhea  : negative for dysuria and hematuria  Musculoskeletal: as above  Neurologic: negative for syncope   Hematologic: negative for bleeding disorder    Physical Exam:  Vitals: /80   Ht 1.778 m (5' 10\")   Wt 76.1 kg (167 lb 11.2 oz)   BMI 24.06 kg/m    BMI= Body mass index is 24.06 kg/m .  Constitutional: healthy, alert and no acute distress   Psychiatric: mentation appears normal and affect normal/bright  NEURO: no focal deficits  RESP: Normal with easy respirations and no use of accessory muscles to breathe, no audible wheezing or retractions  CV: LLE:  no edema           SKIN: No erythema, rashes, excoriation, or breakdown. No evidence of " infection.   JOINT/EXTREMITIES: Left knee: Varus alignment.  Pseudolaxity medial valgus stressing.  Active motion 2-120 degrees.  Patella tracks midline.  Good muscle tone.    GAIT: not tested             Diagnostic Modalities:  left knee X-ray: varus alignment medial compartment:  with bone on bone wear, subchondral cysts, subchondral sclerosis lateral compartment:  no significant joint space narrowing patellofemoral compartment:  with minimal joint space narrowing, osteophytes  Independent visualization of the images was performed.      Impression: left knee primary osteoarthritis    Plan:  All of the above pertinent physical exam and imaging modalities findings was reviewed with Benjamín.                                          INJECTION PROCEDURE:  The patient was counseled about an  injection, including discussion of risks (including infection), contents of the injection, rationale for performing the injection, and expected benefits of the injection. The skin was prepped with alcohol and betadine and then utilizing sterile technique an injection of the left knee joint from the anterolateral approach in the seated position was performed. The injection consisted 1ml of Kenalog (40mg per 1 ml) mixed with 3ml of 0.5% Marcaine. The patient tolerated the injection well, and there were no complications. The injection site was covered with a Band-Aid. The injection was performed by Randell Sosa, APRN, CNP, DNP    Options discussed.  Plan to proceed with steroid injection.  I wished him luck on his upcoming solo archery elk hunt.    Return to clinic 6, week(s), PRN, or sooner as needed for changes.  Re-x-ray on return: No    Richard Blanco D.O.

## 2021-09-20 DIAGNOSIS — K21.9 GASTROESOPHAGEAL REFLUX DISEASE WITHOUT ESOPHAGITIS: ICD-10-CM

## 2021-09-20 DIAGNOSIS — E78.5 HYPERLIPIDEMIA, UNSPECIFIED HYPERLIPIDEMIA TYPE: ICD-10-CM

## 2021-09-21 RX ORDER — ATORVASTATIN CALCIUM 40 MG/1
TABLET, FILM COATED ORAL
Qty: 90 TABLET | Refills: 0 | Status: SHIPPED | OUTPATIENT
Start: 2021-09-21 | End: 2021-12-15

## 2021-09-21 NOTE — TELEPHONE ENCOUNTER
Pending Prescriptions:                       Disp   Refills    omeprazole (PRILOSEC) 20 MG DR capsule [P*90 cap*0            Sig: TAKE 1 CAPSULE DAILY    atorvastatin (LIPITOR) 40 MG tablet [Phar*90 tab*0            Sig: TAKE 1 TABLET DAILY    Medication is being filled for 1 time chhaya refill only due to:  Patient is due for med check with fasting labs    Please call and help schedule.  Thank you!

## 2021-10-23 ENCOUNTER — HEALTH MAINTENANCE LETTER (OUTPATIENT)
Age: 61
End: 2021-10-23

## 2021-12-12 DIAGNOSIS — E78.5 HYPERLIPIDEMIA, UNSPECIFIED HYPERLIPIDEMIA TYPE: ICD-10-CM

## 2021-12-15 RX ORDER — ATORVASTATIN CALCIUM 40 MG/1
TABLET, FILM COATED ORAL
Qty: 90 TABLET | Refills: 0 | Status: SHIPPED | OUTPATIENT
Start: 2021-12-15 | End: 2022-03-18

## 2021-12-15 NOTE — TELEPHONE ENCOUNTER
Pending Prescriptions:                       Disp   Refills    atorvastatin (LIPITOR) 40 MG tablet [Pharm*90 tab*3        Sig: TAKE 1 TABLET DAILY    Routing refill request to provider for review/approval because:  Patient needs to be seen because it has been more than 1 year since last office visit.

## 2022-01-13 ENCOUNTER — TRANSFERRED RECORDS (OUTPATIENT)
Dept: HEALTH INFORMATION MANAGEMENT | Facility: CLINIC | Age: 62
End: 2022-01-13
Payer: COMMERCIAL

## 2022-01-19 DIAGNOSIS — G25.81 RESTLESS LEGS SYNDROME (RLS): Primary | ICD-10-CM

## 2022-01-20 ENCOUNTER — LAB (OUTPATIENT)
Dept: LAB | Facility: OTHER | Age: 62
End: 2022-01-20
Payer: COMMERCIAL

## 2022-01-20 DIAGNOSIS — G25.81 RESTLESS LEGS SYNDROME (RLS): ICD-10-CM

## 2022-01-20 LAB
CANNABINOIDS UR QL SCN: ABNORMAL
CREAT UR-MCNC: 85 MG/DL
FERRITIN SERPL-MCNC: 100 NG/ML (ref 26–388)

## 2022-01-20 PROCEDURE — 80307 DRUG TEST PRSMV CHEM ANLYZR: CPT

## 2022-01-20 PROCEDURE — 36415 COLL VENOUS BLD VENIPUNCTURE: CPT

## 2022-01-20 PROCEDURE — 82728 ASSAY OF FERRITIN: CPT

## 2022-01-23 LAB
EDDP CTO UR SCN-MCNC: 820 NG/ML
EDDP/CREAT UR: 965 NG/MG {CREAT}
GABAPENTIN UR QL CFM: PRESENT
METHADONE UR CFM-MCNC: 338 NG/ML
METHADONE/CREAT UR: 398 NG/MG {CREAT}

## 2022-02-12 ENCOUNTER — HEALTH MAINTENANCE LETTER (OUTPATIENT)
Age: 62
End: 2022-02-12

## 2022-03-03 ENCOUNTER — MYC MEDICAL ADVICE (OUTPATIENT)
Dept: ORTHOPEDICS | Facility: CLINIC | Age: 62
End: 2022-03-03
Payer: COMMERCIAL

## 2022-04-13 ENCOUNTER — OFFICE VISIT (OUTPATIENT)
Dept: ORTHOPEDICS | Facility: CLINIC | Age: 62
End: 2022-04-13
Payer: COMMERCIAL

## 2022-04-13 VITALS
SYSTOLIC BLOOD PRESSURE: 120 MMHG | HEART RATE: 61 BPM | WEIGHT: 165 LBS | BODY MASS INDEX: 23.62 KG/M2 | OXYGEN SATURATION: 100 % | DIASTOLIC BLOOD PRESSURE: 68 MMHG | HEIGHT: 70 IN

## 2022-04-13 DIAGNOSIS — M17.12 PRIMARY OSTEOARTHRITIS OF LEFT KNEE: Primary | ICD-10-CM

## 2022-04-13 PROCEDURE — 20610 DRAIN/INJ JOINT/BURSA W/O US: CPT | Mod: LT | Performed by: ORTHOPAEDIC SURGERY

## 2022-04-13 RX ORDER — LIDOCAINE HYDROCHLORIDE 10 MG/ML
5 INJECTION, SOLUTION INFILTRATION; PERINEURAL
Status: DISCONTINUED | OUTPATIENT
Start: 2022-04-13 | End: 2022-06-20

## 2022-04-13 RX ORDER — METHYLPREDNISOLONE ACETATE 80 MG/ML
80 INJECTION, SUSPENSION INTRA-ARTICULAR; INTRALESIONAL; INTRAMUSCULAR; SOFT TISSUE
Status: SHIPPED | OUTPATIENT
Start: 2022-04-13

## 2022-04-13 RX ADMIN — LIDOCAINE HYDROCHLORIDE 5 ML: 10 INJECTION, SOLUTION INFILTRATION; PERINEURAL at 09:16

## 2022-04-13 RX ADMIN — METHYLPREDNISOLONE ACETATE 80 MG: 80 INJECTION, SUSPENSION INTRA-ARTICULAR; INTRALESIONAL; INTRAMUSCULAR; SOFT TISSUE at 09:16

## 2022-04-13 ASSESSMENT — PAIN SCALES - GENERAL: PAINLEVEL: MILD PAIN (3)

## 2022-04-13 NOTE — PROGRESS NOTES
Large Joint Injection/Arthocentesis: L knee joint    Date/Time: 4/13/2022 9:16 AM  Performed by: Anish Garrett MD  Authorized by: Anish Garrett MD     Indications:  Pain  Needle Size:  22 G  Guidance: landmark guided    Location:  Knee      Medications:  5 mL lidocaine 1 %; 80 mg methylPREDNISolone 80 MG/ML  Outcome:  Tolerated well, no immediate complications  Procedure discussed: discussed risks, benefits, and alternatives    Consent Given by:  Patient  Timeout: timeout called immediately prior to procedure    Prep: patient was prepped and draped in usual sterile fashion

## 2022-04-13 NOTE — LETTER
4/13/2022         RE: Benjamín Mendoza  [address] [facility] [address]        Dear Colleague,    Thank you for referring your patient, Benjamín Mendoza, to the [facility]. Please see a copy of my visit note below.    Follow up left knee primary osteoarthritis.  Had arthroscopy in 2017 for medial meniscus tear.  Has osteoarthritis medial with bone-on-bone on [place].  [place]  Last injection 8/16/21.  Range of motion 0-135.    He  desires injection today of left knee(s).  Risks, benefits, potential complications and alternatives were discussed.   With the patient's consent, sterile prep was performed of left knee(s).  Left knee was injected with Depo Medrol 80 mg and lidocaine at anteromedial site.  Return to clinic as needed.       Large Joint Injection/Arthocentesis: L knee joint    Date/Time: 4/13/2022 9:16 AM  Performed by: Anish Garrett MD  Authorized by: Anish Garrett MD     Indications:  Pain  Needle Size:  22 G  Guidance: landmark guided    Location:  Knee      Medications:  5 mL lidocaine 1 %; 80 mg methylPREDNISolone 80 MG/ML  Outcome:  Tolerated well, no immediate complications  Procedure discussed: discussed risks, benefits, and alternatives    Consent Given by:  Patient  Timeout: timeout called immediately prior to procedure    Prep: patient was prepped and draped in usual sterile fashion              Again, thank you for allowing me to participate in the care of your patient.        Sincerely,        Anish Garrett MD

## 2022-04-13 NOTE — PROGRESS NOTES
Follow up left knee primary osteoarthritis.  Had arthroscopy in 2017 for medial meniscus tear.  Has osteoarthritis medial with bone-on-bone on [place].  [place]  Last injection 8/16/21.  Range of motion 0-135.    He  desires injection today of left knee(s).  Risks, benefits, potential complications and alternatives were discussed.   With the patient's consent, sterile prep was performed of left knee(s).  Left knee was injected with Depo Medrol 80 mg and lidocaine at anteromedial site.  Return to clinic as needed.

## 2022-04-17 DIAGNOSIS — K21.9 GASTROESOPHAGEAL REFLUX DISEASE WITHOUT ESOPHAGITIS: ICD-10-CM

## 2022-04-18 NOTE — TELEPHONE ENCOUNTER
Pending Prescriptions:                       Disp   Refills    omeprazole (PRILOSEC) 20 MG DR capsule [P*90 cap*0            Sig: TAKE 1 CAPSULE DAILY    Medication is being filled for 1 time chhaya refill only due to:  Patient is due for annual and med check    Please call and help schedule.  Thank you!

## 2022-06-08 ENCOUNTER — VIRTUAL VISIT (OUTPATIENT)
Dept: FAMILY MEDICINE | Facility: CLINIC | Age: 62
End: 2022-06-08

## 2022-06-08 ENCOUNTER — ALLIED HEALTH/NURSE VISIT (OUTPATIENT)
Dept: FAMILY MEDICINE | Facility: CLINIC | Age: 62
End: 2022-06-08
Payer: COMMERCIAL

## 2022-06-08 DIAGNOSIS — R05.9 COUGH: ICD-10-CM

## 2022-06-08 DIAGNOSIS — J02.9 SORE THROAT: ICD-10-CM

## 2022-06-08 DIAGNOSIS — J02.9 ACUTE PHARYNGITIS, UNSPECIFIED ETIOLOGY: ICD-10-CM

## 2022-06-08 DIAGNOSIS — J02.9 SORE THROAT: Primary | ICD-10-CM

## 2022-06-08 LAB
DEPRECATED S PYO AG THROAT QL EIA: NEGATIVE
GROUP A STREP BY PCR: NOT DETECTED
SARS-COV-2 RNA RESP QL NAA+PROBE: NEGATIVE

## 2022-06-08 PROCEDURE — 99213 OFFICE O/P EST LOW 20 MIN: CPT | Mod: 95 | Performed by: PHYSICIAN ASSISTANT

## 2022-06-08 PROCEDURE — U0003 INFECTIOUS AGENT DETECTION BY NUCLEIC ACID (DNA OR RNA); SEVERE ACUTE RESPIRATORY SYNDROME CORONAVIRUS 2 (SARS-COV-2) (CORONAVIRUS DISEASE [COVID-19]), AMPLIFIED PROBE TECHNIQUE, MAKING USE OF HIGH THROUGHPUT TECHNOLOGIES AS DESCRIBED BY CMS-2020-01-R: HCPCS

## 2022-06-08 PROCEDURE — 99207 PR NO CHARGE NURSE ONLY: CPT

## 2022-06-08 PROCEDURE — 87651 STREP A DNA AMP PROBE: CPT

## 2022-06-08 PROCEDURE — U0005 INFEC AGEN DETEC AMPLI PROBE: HCPCS

## 2022-06-08 NOTE — PROGRESS NOTES
Benjamín is a 62 year old who is being evaluated via a billable video visit.        How would you like to obtain your AVS? MyChart     If the video visit is dropped, the invitation should be resent by: Text to cell phone: [phone]     Will anyone else be joining your video visit? No      Video Start Time: 10:08 AM    Assessment & Plan     Sore throat  Rapid strep is negative await culture.  Since they will be going out of town, I did prescribe Augmentin to be used up he has progressive sinus symptoms lasting 7 to 10 days.  He did not take for his current symptoms  - Symptomatic; Yes; 6/4/2022 COVID-19 Virus (Coronavirus) by PCR Nose; Future  - amoxicillin-clavulanate (AUGMENTIN) 875-125 MG tablet; Take 1 tablet by mouth 2 times daily    Cough  We will rule out COVID to the fact he will be traveling on an airplane and also visiting elderly relatives  - Symptomatic; Yes; 6/4/2022 COVID-19 Virus (Coronavirus) by PCR Nose; Future    Acute pharyngitis, unspecified etiology  As above, he will use over-the-counter meds as needed      No follow-ups on file.    Aleida Larson PA-C  [facility] Butts is a 62 year old who presents for the following health issues  accompanied by his SELF.    History of Present Illness       He eats 0-1 servings of fruits and vegetables daily.He consumes 1 sweetened beverage(s) daily.He exercises with enough effort to increase his heart rate 30 to 60 minutes per day.  He exercises with enough effort to increase his heart rate 4 days per week.   He is taking medications regularly.       Acute Illness  Acute illness concerns: Sore throat  Onset/Duration: Saturday, 4 days ago.  Symptoms:  Fever: no  Chills/Sweats: no  Headache (location?): no  Sinus Pressure: YES  Conjunctivitis:  no  Ear Pain: no  Rhinorrhea: no  Congestion: YES  Sore Throat: YES  Cough: YES-non-productive- deep congestion, no SHORTNESS OF BREATH, he is coughing up mucus- clear  No chest pain   Does not have covid test at home.    Wheeze: no  Decreased Appetite: no  Nausea: no  Vomiting: no  Diarrhea: no  Dysuria/Freq.: no  Dysuria or Hematuria: no  Fatigue/Achiness: YES- fatigue and body aches, he states sore throat and fatigue are his worst symptoms.  Sick/Strep Exposure: no  Therapies tried and outcome: Nyquil and tylenol    He has the original COVID vaccine series but no boosters    Review of Systems   As documented above       Objective           Vitals:  No vitals were obtained today due to virtual visit.    Physical Exam   GENERAL: Healthy, alert and no distress  EYES: Eyes grossly normal to inspection.  No discharge or erythema, or obvious scleral/conjunctival abnormalities.  RESP: No audible wheeze, cough, or visible cyanosis.  No visible retractions or increased work of breathing.    SKIN: Visible skin clear. No significant rash, abnormal pigmentation or lesions.  NEURO: Cranial nerves grossly intact.  Mentation and speech appropriate for age.  PSYCH: Mentation appears normal, affect normal/bright, judgement and insight intact, normal speech and appearance well-groomed.    Results for orders placed or performed in visit on 06/08/22   Streptococcus A Rapid Screen w/Reflex to PCR - Clinic Collect     Status: Normal    Specimen: Throat; Swab   Result Value Ref Range    Group A Strep antigen Negative Negative               Video-Visit Details    Type of service:  Video Visit    Video End Time:10:16 AM    Originating Location (pt. Location): Home    Distant Location (provider location):  [facility]     Platform used for Video Visit: [employer]

## 2022-06-20 ENCOUNTER — OFFICE VISIT (OUTPATIENT)
Dept: FAMILY MEDICINE | Facility: CLINIC | Age: 62
End: 2022-06-20
Payer: COMMERCIAL

## 2022-06-20 VITALS
BODY MASS INDEX: 25.48 KG/M2 | HEIGHT: 69 IN | HEART RATE: 62 BPM | DIASTOLIC BLOOD PRESSURE: 70 MMHG | OXYGEN SATURATION: 99 % | WEIGHT: 172 LBS | RESPIRATION RATE: 16 BRPM | SYSTOLIC BLOOD PRESSURE: 118 MMHG | TEMPERATURE: 98.2 F

## 2022-06-20 DIAGNOSIS — Z12.5 SCREENING FOR PROSTATE CANCER: ICD-10-CM

## 2022-06-20 DIAGNOSIS — Z11.4 SCREENING FOR HIV (HUMAN IMMUNODEFICIENCY VIRUS): ICD-10-CM

## 2022-06-20 DIAGNOSIS — Z23 NEED FOR VACCINATION: ICD-10-CM

## 2022-06-20 DIAGNOSIS — E78.5 HYPERLIPIDEMIA, UNSPECIFIED HYPERLIPIDEMIA TYPE: ICD-10-CM

## 2022-06-20 DIAGNOSIS — K59.09 CHRONIC CONSTIPATION: ICD-10-CM

## 2022-06-20 DIAGNOSIS — Z13.0 SCREENING FOR DEFICIENCY ANEMIA: ICD-10-CM

## 2022-06-20 DIAGNOSIS — Z00.00 ROUTINE GENERAL MEDICAL EXAMINATION AT A HEALTH CARE FACILITY: Primary | ICD-10-CM

## 2022-06-20 DIAGNOSIS — K21.9 GASTROESOPHAGEAL REFLUX DISEASE WITHOUT ESOPHAGITIS: ICD-10-CM

## 2022-06-20 DIAGNOSIS — G89.29 CHRONIC MIDLINE LOW BACK PAIN WITHOUT SCIATICA: ICD-10-CM

## 2022-06-20 DIAGNOSIS — Z13.1 SCREENING FOR DIABETES MELLITUS: ICD-10-CM

## 2022-06-20 DIAGNOSIS — M54.50 CHRONIC MIDLINE LOW BACK PAIN WITHOUT SCIATICA: ICD-10-CM

## 2022-06-20 PROCEDURE — 90471 IMMUNIZATION ADMIN: CPT | Performed by: PHYSICIAN ASSISTANT

## 2022-06-20 PROCEDURE — 90715 TDAP VACCINE 7 YRS/> IM: CPT | Performed by: PHYSICIAN ASSISTANT

## 2022-06-20 PROCEDURE — 99396 PREV VISIT EST AGE 40-64: CPT | Mod: 25 | Performed by: PHYSICIAN ASSISTANT

## 2022-06-20 RX ORDER — SENNOSIDES AND DOCUSATE SODIUM 8.6; 5 MG/1; MG/1
TABLET ORAL DAILY PRN
COMMUNITY
Start: 2022-03-18

## 2022-06-20 RX ORDER — DOCUSATE SODIUM 100 MG/1
CAPSULE ORAL DAILY PRN
COMMUNITY
Start: 2022-01-24 | End: 2023-01-02

## 2022-06-20 RX ORDER — ATORVASTATIN CALCIUM 40 MG/1
40 TABLET, FILM COATED ORAL DAILY
Qty: 90 TABLET | Refills: 3 | Status: SHIPPED | OUTPATIENT
Start: 2022-06-20 | End: 2022-06-21

## 2022-06-20 ASSESSMENT — ENCOUNTER SYMPTOMS
DIARRHEA: 0
ARTHRALGIAS: 1
DIZZINESS: 0
FREQUENCY: 0
MYALGIAS: 1
CONSTIPATION: 1
NERVOUS/ANXIOUS: 0
JOINT SWELLING: 1
PALPITATIONS: 0
DYSURIA: 0
FEVER: 0
HEADACHES: 0
PARESTHESIAS: 0
SORE THROAT: 0
SHORTNESS OF BREATH: 0
NAUSEA: 0
CHILLS: 0
HEARTBURN: 0
EYE PAIN: 0
HEMATOCHEZIA: 0
ABDOMINAL PAIN: 0
WEAKNESS: 0
HEMATURIA: 0
COUGH: 0

## 2022-06-20 ASSESSMENT — ANXIETY QUESTIONNAIRES
GAD7 TOTAL SCORE: 3
IF YOU CHECKED OFF ANY PROBLEMS ON THIS QUESTIONNAIRE, HOW DIFFICULT HAVE THESE PROBLEMS MADE IT FOR YOU TO DO YOUR WORK, TAKE CARE OF THINGS AT HOME, OR GET ALONG WITH OTHER PEOPLE: SOMEWHAT DIFFICULT
7. FEELING AFRAID AS IF SOMETHING AWFUL MIGHT HAPPEN: NOT AT ALL
4. TROUBLE RELAXING: SEVERAL DAYS
GAD7 TOTAL SCORE: 3
3. WORRYING TOO MUCH ABOUT DIFFERENT THINGS: NOT AT ALL
2. NOT BEING ABLE TO STOP OR CONTROL WORRYING: NOT AT ALL
1. FEELING NERVOUS, ANXIOUS, OR ON EDGE: NOT AT ALL
6. BECOMING EASILY ANNOYED OR IRRITABLE: SEVERAL DAYS
5. BEING SO RESTLESS THAT IT IS HARD TO SIT STILL: SEVERAL DAYS

## 2022-06-20 ASSESSMENT — PATIENT HEALTH QUESTIONNAIRE - PHQ9: SUM OF ALL RESPONSES TO PHQ QUESTIONS 1-9: 4

## 2022-06-20 ASSESSMENT — PAIN SCALES - GENERAL: PAINLEVEL: NO PAIN (0)

## 2022-06-20 NOTE — PROGRESS NOTES
SUBJECTIVE:   CC: Benjamín Mendoza is an 62 year old male who presents for preventative health visit.       Patient has been advised of split billing requirements and indicates understanding: Yes  Healthy Habits:     Getting at least 3 servings of Calcium per day:  NO    Bi-annual eye exam:  NO    Dental care twice a year:  Yes    Sleep apnea or symptoms of sleep apnea:  Sleep apnea    Diet:  Regular (no restrictions)    Frequency of exercise:  2-3 days/week    Duration of exercise:  45-60 minutes    Taking medications regularly:  Yes    Medication side effects:  None    PHQ-2 Total Score: 0    Additional concerns today:  No      Today's PHQ-2 Score:   PHQ-2 ( 1999 Pfizer) 6/20/2022   Q1: Little interest or pleasure in doing things 0   Q2: Feeling down, depressed or hopeless 0   PHQ-2 Score 0   PHQ-2 Total Score (12-17 Years)- Positive if 3 or more points; Administer PHQ-A if positive -   Q1: Little interest or pleasure in doing things Not at all   Q2: Feeling down, depressed or hopeless Not at all   PHQ-2 Score 0       Abuse: Current or Past(Physical, Sexual or Emotional)- No  Do you feel safe in your environment? Yes    Have you ever done Advance Care Planning? (For example, a Health Directive, POLST, or a discussion with a medical provider or your loved ones about your wishes): Yes, patient states has an Advance Care Planning document and will bring a copy to the clinic.    Social History     Tobacco Use     Smoking status: Never Smoker     Smokeless tobacco: Never Used     Tobacco comment: no smokers in household   Substance Use Topics     Alcohol use: No         Alcohol Use 6/20/2022   Prescreen: >3 drinks/day or >7 drinks/week? Not Applicable   Prescreen: >3 drinks/day or >7 drinks/week? -       Last PSA:   PSA   Date Value Ref Range Status   10/14/2015 0.64 0 - 4 ug/L Final       Reviewed orders with patient. Reviewed health maintenance and updated orders accordingly - Yes  Labs reviewed in EPIC  BP Readings from  Last 3 Encounters:   22 118/70   22 120/68   21 100/80    Wt Readings from Last 3 Encounters:   22 78 kg (172 lb)   22 74.8 kg (165 lb)   21 76.1 kg (167 lb 11.2 oz)                  Patient Active Problem List   Diagnosis     Headache     CARY (obstructive sleep apnea)     Tinnitus of both ears     Insomnia, unspecified type     Chronic midline low back pain without sciatica     Chronic pain syndrome     Advanced directives, counseling/discussion     Chondromalacia, knee, left     Other tear of medial meniscus of left knee as current injury, initial encounter     Gastroesophageal reflux disease without esophagitis     Primary osteoarthritis of left knee     Past Surgical History:   Procedure Laterality Date     ARTHROSCOPY KNEE WITH MEDIAL MENISCECTOMY Left 2018    Procedure: ARTHROSCOPY KNEE WITH MEDIAL MENISCECTOMY;  Left knee arthroscopy with arthroscopic partial meniscectomy;  Surgeon: Dane Blanco DO;  Location: [facility] OR     COLONOSCOPY  07     COLONOSCOPY N/A 2017    Procedure: COLONOSCOPY;  colonoscopy;  Surgeon: Lake Mueller MD;  Location:  GI     HC TRABECULOPLASTY BY LASER SURGERY      LASIK     SEPTOPLASTY, TURBINOPLASTY, COMBINED N/A 2016    Procedure: COMBINED SEPTOPLASTY, TURBINOPLASTY;  Surgeon: Ken Molina MD;  Location:  OR     [facility]         Social History     Tobacco Use     Smoking status: Never Smoker     Smokeless tobacco: Never Used     Tobacco comment: no smokers in household   Substance Use Topics     Alcohol use: No     Family History   Problem Relation Age of Onset     Cerebrovascular Disease Mother 66        aneursym -  at 66     Other - See Comments Sister         Half sister, heart problems unsure what kind     Other - See Comments Brother         Was in a fire     No Known Problems Maternal Grandmother      No Known Problems Maternal Grandfather      Arthritis Maternal Aunt       Arthritis Maternal Uncle      Muscular Disorder Daughter 18        Small fiber neuropathy     Unknown/Adopted Father      Unknown/Adopted Paternal Grandmother      Unknown/Adopted Paternal Grandfather      Hypertension No family hx of      Diabetes No family hx of          Current Outpatient Medications   Medication Sig Dispense Refill     atorvastatin (LIPITOR) 40 MG tablet Take 1 tablet (40 mg) by mouth daily 90 tablet 3     COLACE 100 MG capsule daily as needed       HORIZANT 600 MG tablet Take 600 mg by mouth At Bedtime       methadone (DOLOPHINE) 5 MG tablet        Multiple Vitamin (ONE-A-DAY MENS PO) Take 1 tablet by mouth every evening        omeprazole (PRILOSEC) 20 MG DR capsule Take 1 capsule (20 mg) by mouth daily 90 capsule 3     SENNA-PLUS 8.6-50 MG tablet daily as needed       Allergies   Allergen Reactions     Sumatriptan Nausea and Vomiting     Migraine medications       Reviewed and updated as needed this visit by clinical staff   Tobacco  Allergies  Meds   Med Hx  Surg Hx  Fam Hx  Soc Hx          Reviewed and updated as needed this visit by Provider                       Review of Systems   Constitutional: Negative for chills and fever.   HENT: Negative for congestion, ear pain, hearing loss and sore throat.    Eyes: Negative for pain.   Respiratory: Negative for cough and shortness of breath.    Cardiovascular: Negative for chest pain, palpitations and peripheral edema.   Gastrointestinal: Positive for constipation. Negative for abdominal pain, diarrhea, heartburn, hematochezia and nausea.   Genitourinary: Negative for dysuria, frequency, genital sores, hematuria, impotence, penile discharge and urgency.   Musculoskeletal: Positive for arthralgias, joint swelling and myalgias.   Skin: Negative for rash.   Neurological: Negative for dizziness, weakness, headaches and paresthesias.   Psychiatric/Behavioral: Negative for mood changes. The patient is not nervous/anxious.      Constipation is  "constant,he has always been constipated,  methadone may have worsened it a bit.  Senna and colace eating dates , may go every 10 days, rarely he goes routinely but it does not last long before his constipation returns. Diet is unchanged and he has always drank a lot of water. He is active.  He has tried MiraLAX in the past but that does not help him.  He is very tired of chronic constipation and would like to see a specialist.    He is seeing orthopedics for his knee arthritis.  Within the last month he has developed left elbow pain as well.    OBJECTIVE:   /70   Pulse 62   Temp 98.2  F (36.8  C) (Temporal)   Resp 16   Ht 1.753 m (5' 9\")   Wt 78 kg (172 lb)   SpO2 99%   BMI 25.40 kg/m      Physical Exam  GENERAL: healthy, alert and no distress  EYES: Eyes grossly normal to inspection, PERRL and conjunctivae and sclerae normal  HENT: ear canals and TM's normal, nose and mouth without ulcers or lesions  NECK: no adenopathy, no asymmetry, masses, or scars and thyroid normal to palpation  RESP: lungs clear to auscultation - no rales, rhonchi or wheezes  CV: regular rate and rhythm, normal S1 S2, no S3 or S4, no murmur, click or rub, no peripheral edema and peripheral pulses strong  ABDOMEN: soft, nontender, no hepatosplenomegaly, no masses and bowel sounds normal  MS: no gross musculoskeletal defects noted, no edema  MS: Left elbow tender over the lateral epicondyle  SKIN: no suspicious lesions or rashes  NEURO: Normal strength and tone, mentation intact and speech normal  PSYCH: mentation appears normal, affect normal/bright    Diagnostic Test Results:  Labs reviewed in Epic  No results found for this or any previous visit (from the past 24 hour(s)).    ASSESSMENT/PLAN:   (Z11.4) Screening for HIV (human immunodeficiency virus)  (primary encounter diagnosis)  Comment: Patient declined  Plan: Removed from health maintenance    (Z00.00) Routine general medical examination at a health care facility  Comment: " "  Plan: Recommend routine annual visits    (E78.5) Hyperlipidemia, unspecified hyperlipidemia type  Comment: Doing very well, continue current management  Plan: atorvastatin (LIPITOR) 40 MG tablet, Lipid         panel reflex to direct LDL Fasting,         Comprehensive metabolic panel            (K21.9) Gastroesophageal reflux disease without esophagitis  Comment: Well-controlled on medications  Plan: omeprazole (PRILOSEC) 20 MG DR capsule        Continue current meds recheck 1 year    (M54.50,  G89.29) Chronic midline low back pain without sciatica  Comment:   Plan:     (Z23) Need for vaccination  Comment: Updated  Plan: TDAP VACCINE (Adacel, Boostrix)  [[id number]]            (K59.09) Chronic constipation  Comment: Refer to GI  Plan: Adult Gastro Ref - Consult Only            (Z12.5) Screening for prostate cancer  Comment:   Plan: PSA, screen            (Z13.0) Screening for deficiency anemia  Comment:   Plan: CBC with platelets            (Z13.1) Screening for diabetes mellitus  Comment:   Plan: Comprehensive metabolic panel              Patient has been advised of split billing requirements and indicates understanding: Yes    COUNSELING:   Reviewed preventive health counseling, as reflected in patient instructions    Estimated body mass index is 25.4 kg/m  as calculated from the following:    Height as of this encounter: 1.753 m (5' 9\").    Weight as of this encounter: 78 kg (172 lb).         He reports that he has never smoked. He has never used smokeless tobacco.      Counseling Resources:  ATP IV Guidelines  Pooled Cohorts Equation Calculator  FRAX Risk Assessment  ICSI Preventive Guidelines  Dietary Guidelines for Americans, 2010  USDA's MyPlate  ASA Prophylaxis  Lung CA Screening    Aleida Larson PA-C  [facility]  "

## 2022-06-20 NOTE — NURSING NOTE
Prior to immunization administration, verified patients identity using patient s name and date of birth. Please see Immunization Activity for additional information.     Screening Questionnaire for Adult Immunization    Are you sick today?   No   Do you have allergies to medications, food, a vaccine component or latex?   YES   Have you ever had a serious reaction after receiving a vaccination?   No   Do you have a long-term health problem with heart, lung, kidney, or metabolic disease (e.g., diabetes), asthma, a blood disorder, no spleen, complement component deficiency, a cochlear implant, or a spinal fluid leak?  Are you on long-term aspirin therapy?   No   Do you have cancer, leukemia, HIV/AIDS, or any other immune system problem?   No   Do you have a parent, brother, or sister with an immune system problem?   No   In the past 3 months, have you taken medications that affect  your immune system, such as prednisone, other steroids, or anticancer drugs; drugs for the treatment of rheumatoid arthritis, Crohn s disease, or psoriasis; or have you had radiation treatments?   No   Have you had a seizure, or a brain or other nervous system problem?   No   During the past year, have you received a transfusion of blood or blood    products, or been given immune (gamma) globulin or antiviral drug?   No   For women: Are you pregnant or is there a chance you could become       pregnant during the next month?   No   Have you received any vaccinations in the past 4 weeks?   No     Immunization questionnaire was positive for at least one answer.  Notified provider.        Per orders of Aleida Larson PA-C, injection of tdap given by Tabatha Simental CMA. Patient instructed to remain in clinic for 15 minutes afterwards, and to report any adverse reaction to me immediately.       Screening performed by Tabatha Simental CMA on 6/20/2022 at 7:54 AM.

## 2022-06-21 ENCOUNTER — MYC MEDICAL ADVICE (OUTPATIENT)
Dept: FAMILY MEDICINE | Facility: CLINIC | Age: 62
End: 2022-06-21
Payer: COMMERCIAL

## 2022-06-21 DIAGNOSIS — E78.5 HYPERLIPIDEMIA, UNSPECIFIED HYPERLIPIDEMIA TYPE: ICD-10-CM

## 2022-06-21 DIAGNOSIS — K21.9 GASTROESOPHAGEAL REFLUX DISEASE WITHOUT ESOPHAGITIS: ICD-10-CM

## 2022-06-21 RX ORDER — ATORVASTATIN CALCIUM 40 MG/1
40 TABLET, FILM COATED ORAL DAILY
Qty: 90 TABLET | Refills: 3 | Status: SHIPPED | OUTPATIENT
Start: 2022-06-21 | End: 2023-05-16

## 2022-06-21 NOTE — TELEPHONE ENCOUNTER
Patient requesting medications that were sent yesterday be resent to [facility].     RXs were resent and cancelled at [facility].     ANDREZ WatkinsN, RN  [facility]  June 21, 2022

## 2022-07-15 ENCOUNTER — TRANSFERRED RECORDS (OUTPATIENT)
Dept: FAMILY MEDICINE | Facility: CLINIC | Age: 62
End: 2022-07-15

## 2022-08-25 ENCOUNTER — OFFICE VISIT (OUTPATIENT)
Dept: ORTHOPEDICS | Facility: CLINIC | Age: 62
End: 2022-08-25
Payer: COMMERCIAL

## 2022-08-25 VITALS
SYSTOLIC BLOOD PRESSURE: 144 MMHG | HEIGHT: 69 IN | DIASTOLIC BLOOD PRESSURE: 90 MMHG | BODY MASS INDEX: 25.18 KG/M2 | HEART RATE: 88 BPM | WEIGHT: 170 LBS

## 2022-08-25 DIAGNOSIS — M17.12 PRIMARY OSTEOARTHRITIS OF LEFT KNEE: Primary | ICD-10-CM

## 2022-08-25 PROCEDURE — 20610 DRAIN/INJ JOINT/BURSA W/O US: CPT | Mod: LT | Performed by: ORTHOPAEDIC SURGERY

## 2022-08-25 RX ORDER — METHYLPREDNISOLONE ACETATE 80 MG/ML
80 INJECTION, SUSPENSION INTRA-ARTICULAR; INTRALESIONAL; INTRAMUSCULAR; SOFT TISSUE
Status: SHIPPED | OUTPATIENT
Start: 2022-08-25

## 2022-08-25 RX ORDER — LIDOCAINE HYDROCHLORIDE 10 MG/ML
5 INJECTION, SOLUTION INFILTRATION; PERINEURAL
Status: SHIPPED | OUTPATIENT
Start: 2022-08-25

## 2022-08-25 RX ADMIN — LIDOCAINE HYDROCHLORIDE 5 ML: 10 INJECTION, SOLUTION INFILTRATION; PERINEURAL at 13:26

## 2022-08-25 RX ADMIN — METHYLPREDNISOLONE ACETATE 80 MG: 80 INJECTION, SUSPENSION INTRA-ARTICULAR; INTRALESIONAL; INTRAMUSCULAR; SOFT TISSUE at 13:26

## 2022-08-25 NOTE — LETTER
8/25/2022         RE: Benjamín Mendoza  [address]  [place] [address]        Dear Colleague,    Thank you for referring your patient, Benjamín Mendoza, to the [facility]. Please see a copy of my visit note below.    Follow up left knee primary osteoarthritis.  Had arthroscopy in 2017 for medial meniscus tear.  Has osteoarthritis medial with bone-on-bone on [place] view.  [place]  Last injection 4/13/22  Range of motion 0-135.    He  desires injection today of left knee(s).  Risks, benefits, potential complications and alternatives were discussed.   With the patient's consent, sterile prep was performed of left knee(s).  Left knee was injected with Depo Medrol 80 mg and lidocaine at anteromedial site.  Return to clinic as needed.       Large Joint Injection/Arthocentesis    Date/Time: 8/25/2022 1:26 PM  Performed by: Anish Garrett MD  Authorized by: Anish Garrett MD     Indications:  Pain  Needle Size:  22 G  Guidance: landmark guided    Location:  Knee      Medications:  5 mL lidocaine 1 %; 80 mg methylPREDNISolone 80 MG/ML  Outcome:  Tolerated well, no immediate complications  Procedure discussed: discussed risks, benefits, and alternatives    Consent Given by:  Patient  Timeout: timeout called immediately prior to procedure    Prep: patient was prepped and draped in usual sterile fashion              Again, thank you for allowing me to participate in the care of your patient.        Sincerely,        Anish Garrett MD

## 2022-08-25 NOTE — PROGRESS NOTES
Large Joint Injection/Arthocentesis    Date/Time: 8/25/2022 1:26 PM  Performed by: Anish Garrett MD  Authorized by: Anish Garrett MD     Indications:  Pain  Needle Size:  22 G  Guidance: landmark guided    Location:  Knee      Medications:  5 mL lidocaine 1 %; 80 mg methylPREDNISolone 80 MG/ML  Outcome:  Tolerated well, no immediate complications  Procedure discussed: discussed risks, benefits, and alternatives    Consent Given by:  Patient  Timeout: timeout called immediately prior to procedure    Prep: patient was prepped and draped in usual sterile fashion

## 2022-08-25 NOTE — PROGRESS NOTES
Follow up left knee primary osteoarthritis.  Had arthroscopy in 2017 for medial meniscus tear.  Has osteoarthritis medial with bone-on-bone on [place] view.  [place]  Last injection 4/13/22  Range of motion 0-135.    He  desires injection today of left knee(s).  Risks, benefits, potential complications and alternatives were discussed.   With the patient's consent, sterile prep was performed of left knee(s).  Left knee was injected with Depo Medrol 80 mg and lidocaine at anteromedial site.  Return to clinic as needed.

## 2022-10-09 ENCOUNTER — HEALTH MAINTENANCE LETTER (OUTPATIENT)
Age: 62
End: 2022-10-09

## 2022-11-04 ENCOUNTER — OFFICE VISIT (OUTPATIENT)
Dept: GASTROENTEROLOGY | Facility: CLINIC | Age: 62
End: 2022-11-04
Attending: PHYSICIAN ASSISTANT
Payer: COMMERCIAL

## 2022-11-04 VITALS
HEIGHT: 70 IN | SYSTOLIC BLOOD PRESSURE: 138 MMHG | DIASTOLIC BLOOD PRESSURE: 82 MMHG | BODY MASS INDEX: 25.2 KG/M2 | WEIGHT: 176 LBS

## 2022-11-04 DIAGNOSIS — K59.00 CONSTIPATION, UNSPECIFIED CONSTIPATION TYPE: Primary | ICD-10-CM

## 2022-11-04 DIAGNOSIS — K59.09 CHRONIC CONSTIPATION: ICD-10-CM

## 2022-11-04 DIAGNOSIS — Z98.890 HX OF COLONOSCOPY: ICD-10-CM

## 2022-11-04 PROCEDURE — 99203 OFFICE O/P NEW LOW 30 MIN: CPT | Performed by: INTERNAL MEDICINE

## 2022-11-04 ASSESSMENT — PAIN SCALES - GENERAL: PAINLEVEL: NO PAIN (0)

## 2022-11-04 NOTE — PROGRESS NOTES
Gastroenterology    62-year-old male retired .  Presents with lifelong constipation.  He has failed over-the-counter treatments fiber Colace dates etc.  He has been on methadone for 2 years because of restless legs and there is anticipation of increasing his dosing in the future.  There are no alarm features.  He has failed fluid fiber exercise.  With exercise he is lost 30 pounds of weight.  When his symptoms are especially severe he will have left flank discomfort.  He notes at times he will have a bowel movement daily for 2 months and then occasionally will be constipated for 10 days and then at that time he will treat himself with an Internet Abi powder and MiraLAX for 2 days which is a colon cleanse technique and then he is better afterwards.  Patient believes he has had a recent thyroid testing.  Previous colonoscopy was normal in 2017.  Advised 10-year follow-up.  No enemas.  No suppositories.  Previous enemas have been ineffective.    Past medical history: Headaches, obstructive sleep apnea, insomnia, chronic pain, and knee discomfort, GERD, osteoarthritis of the knee    Medications reviewed on epic    Allergies reviewed on epic    Social no alcohol, no tobacco    Family history noncontributory    Review of systems otherwise negative noncontributory    No acute distress.  Blood pressure 1 3882, pulse 88, afebrile BMI 25.25, head and neck sclera clear lips without lesions neck without adenopathy cardiac S1-S2 without murmur, lungs clear throughout, abdomen soft nontender without organomegaly    Impression: Chronic constipation lifelong duration aggravated by the use of narcotics.  Discussed colonic inertia versus other    Plan: 1.  MiraLAX daily 34 g with 10 mg of bisacodyl 3 days a week may be an option; however, he is currently on senna which is a reasonable choice as a stimulant laxative.    2.  Increase senna to 2 tablets twice daily this may be a reasonable approach.  Regular use and  not use as needed.    3.  Discontinue Colace as this is likely to be less effective.  Wait on Abi powder colon cleanse.    4.  Consider Amitiza 24 mcg twice daily as this may be effective in patients with opioid-induced constipation    5.  Other options such as pelvic floor testing etc. may not necessarily be beneficial at this time.  As needed return

## 2022-11-04 NOTE — LETTER
11/4/2022         RE: Benjamín Mendoza  [address] [place] [address]        Dear Colleague,    Thank you for referring your patient, Benjamín Mendoza, to the [facility]. Please see a copy of my visit note below.    Gastroenterology    62-year-old male retired .  Presents with lifelong constipation.  He has failed over-the-counter treatments fiber Colace dates etc.  He has been on methadone for 2 years because of restless legs and there is anticipation of increasing his dosing in the future.  There are no alarm features.  He has failed fluid fiber exercise.  With exercise he is lost 30 pounds of weight.  When his symptoms are especially severe he will have left flank discomfort.  He notes at times he will have a bowel movement daily for 2 months and then occasionally will be constipated for 10 days and then at that time he will treat himself with an Internet Abi powder and MiraLAX for 2 days which is a colon cleanse technique and then he is better afterwards.  Patient believes he has had a recent thyroid testing.  Previous colonoscopy was normal in 2017.  Advised 10-year follow-up.  No enemas.  No suppositories.  Previous enemas have been ineffective.    Past medical history: Headaches, obstructive sleep apnea, insomnia, chronic pain, and knee discomfort, GERD, osteoarthritis of the knee    Medications reviewed on epic    Allergies reviewed on epic    Social no alcohol, no tobacco    Family history noncontributory    Review of systems otherwise negative noncontributory    No acute distress.  Blood pressure 1 3882, pulse 88, afebrile BMI 25.25, head and neck sclera clear lips without lesions neck without adenopathy cardiac S1-S2 without murmur, lungs clear throughout, abdomen soft nontender without organomegaly    Impression: Chronic constipation lifelong duration aggravated by the use of narcotics.  Discussed colonic inertia versus other    Plan: 1.  MiraLAX  daily 34 g with 10 mg of bisacodyl 3 days a week may be an option; however, he is currently on senna which is a reasonable choice as a stimulant laxative.    2.  Increase senna to 2 tablets twice daily this may be a reasonable approach.  Regular use and not use as needed.    3.  Discontinue Colace as this is likely to be less effective.  Wait on Abi powder colon cleanse.    4.  Consider Amitiza 24 mcg twice daily as this may be effective in patients with opioid-induced constipation    5.  Other options such as pelvic floor testing etc. may not necessarily be beneficial at this time.  As needed return      Again, thank you for allowing me to participate in the care of your patient.        Sincerely,        Yg Perkins MD

## 2022-11-04 NOTE — PATIENT INSTRUCTIONS
As we discussed    1.  Omeprazole is a good choice can be used on an ongoing basis.  [place] [employer] studies showed no increased risk of medical problems, etc    2.  Discontinue the Colace which is not very effective    3.  Senna 2 tablets in the morning and 2 in the afternoon may be more appropriate dose for you    4.  With the challenge of methadone this may be aggravating symptoms.  Amitiza 24 mcg  twice dailymay be a future option with opioid-induced constipation    As needed return

## 2023-01-02 ENCOUNTER — OFFICE VISIT (OUTPATIENT)
Dept: FAMILY MEDICINE | Facility: CLINIC | Age: 63
End: 2023-01-02
Payer: COMMERCIAL

## 2023-01-02 VITALS
HEART RATE: 73 BPM | HEIGHT: 70 IN | OXYGEN SATURATION: 99 % | TEMPERATURE: 98.8 F | SYSTOLIC BLOOD PRESSURE: 134 MMHG | WEIGHT: 172.5 LBS | BODY MASS INDEX: 24.69 KG/M2 | DIASTOLIC BLOOD PRESSURE: 81 MMHG

## 2023-01-02 DIAGNOSIS — F43.23 ADJUSTMENT DISORDER WITH MIXED ANXIETY AND DEPRESSED MOOD: ICD-10-CM

## 2023-01-02 DIAGNOSIS — F41.9 ANXIETY: Primary | ICD-10-CM

## 2023-01-02 PROCEDURE — 99214 OFFICE O/P EST MOD 30 MIN: CPT | Performed by: PHYSICIAN ASSISTANT

## 2023-01-02 RX ORDER — PAROXETINE 10 MG/1
10 TABLET, FILM COATED ORAL EVERY MORNING
Qty: 30 TABLET | Refills: 1 | Status: SHIPPED | OUTPATIENT
Start: 2023-01-02 | End: 2023-01-16

## 2023-01-02 ASSESSMENT — ANXIETY QUESTIONNAIRES
2. NOT BEING ABLE TO STOP OR CONTROL WORRYING: MORE THAN HALF THE DAYS
1. FEELING NERVOUS, ANXIOUS, OR ON EDGE: MORE THAN HALF THE DAYS
7. FEELING AFRAID AS IF SOMETHING AWFUL MIGHT HAPPEN: SEVERAL DAYS
IF YOU CHECKED OFF ANY PROBLEMS ON THIS QUESTIONNAIRE, HOW DIFFICULT HAVE THESE PROBLEMS MADE IT FOR YOU TO DO YOUR WORK, TAKE CARE OF THINGS AT HOME, OR GET ALONG WITH OTHER PEOPLE: VERY DIFFICULT
GAD7 TOTAL SCORE: 14
8. IF YOU CHECKED OFF ANY PROBLEMS, HOW DIFFICULT HAVE THESE MADE IT FOR YOU TO DO YOUR WORK, TAKE CARE OF THINGS AT HOME, OR GET ALONG WITH OTHER PEOPLE?: VERY DIFFICULT
5. BEING SO RESTLESS THAT IT IS HARD TO SIT STILL: MORE THAN HALF THE DAYS
4. TROUBLE RELAXING: MORE THAN HALF THE DAYS
GAD7 TOTAL SCORE: 14
GAD7 TOTAL SCORE: 14
3. WORRYING TOO MUCH ABOUT DIFFERENT THINGS: MORE THAN HALF THE DAYS
7. FEELING AFRAID AS IF SOMETHING AWFUL MIGHT HAPPEN: SEVERAL DAYS
6. BECOMING EASILY ANNOYED OR IRRITABLE: NEARLY EVERY DAY

## 2023-01-02 ASSESSMENT — PAIN SCALES - GENERAL: PAINLEVEL: NO PAIN (0)

## 2023-01-02 ASSESSMENT — ENCOUNTER SYMPTOMS: NERVOUS/ANXIOUS: 1

## 2023-01-02 ASSESSMENT — PATIENT HEALTH QUESTIONNAIRE - PHQ9
SUM OF ALL RESPONSES TO PHQ QUESTIONS 1-9: 15
SUM OF ALL RESPONSES TO PHQ QUESTIONS 1-9: 15
10. IF YOU CHECKED OFF ANY PROBLEMS, HOW DIFFICULT HAVE THESE PROBLEMS MADE IT FOR YOU TO DO YOUR WORK, TAKE CARE OF THINGS AT HOME, OR GET ALONG WITH OTHER PEOPLE: VERY DIFFICULT

## 2023-01-02 NOTE — PROGRESS NOTES
Assessment & Plan     Anxiety  Discussed use of medication, common side effects, how medication works and the fact that improvement in anticipated in 4-6 weeks.  Methadone may increase the serotonergic effects of this med, we need to be very careful with higher dosages.  We will start at the very lowest dosage, we did discuss signs and symptoms of serotonin syndrome.  He should discontinue this med at once and let me know if the symptoms occur  - PARoxetine (PAXIL) 10 MG tablet; Take 1 tablet (10 mg) by mouth every morning    Adjustment disorder with mixed anxiety and depressed mood  As above,  - PARoxetine (PAXIL) 10 MG tablet; Take 1 tablet (10 mg) by mouth every morning    Will recheck in 1 month virtual visit is fine         Depression Screening Follow Up    PHQ 1/2/2023   PHQ-9 Total Score 15   Q9: Thoughts of better off dead/self-harm past 2 weeks Not at all         Follow Up Actions Taken  Follow up recommended: 1 month with myself to recheck meds         Return in about 1 month (around 2/2/2023) for depression/anxiety.    Aleida Larson PA-C  [facility] JEVON Butts is a 62 year old accompanied by his self, presenting for the following health issues:  Anxiety, Depression, Headache, and Dizziness (/)      Anxiety    History of Present Illness       Mental Health Follow-up:  Patient presents to follow-up on Depression & Anxiety.Patient's depression since last visit has been:  Worse  The patient is not having other symptoms associated with depression.  Patient's anxiety since last visit has been:  Worse  The patient is not having other symptoms associated with anxiety.  Any significant life events: relationship concerns and financial concerns  Patient is feeling anxious or having panic attacks.  Patient has no concerns about alcohol or drug use.    He eats 0-1 servings of fruits and vegetables daily.He consumes 0 sweetened beverage(s) daily.He exercises with enough effort to  "increase his heart rate 30 to 60 minutes per day.  He exercises with enough effort to increase his heart rate 4 days per week.   He is taking medications regularly.    Today's PHQ-9         PHQ-9 Total Score: 15    PHQ-9 Q9 Thoughts of better off dead/self-harm past 2 weeks :   Not at all    How difficult have these problems made it for you to do your work, take care of things at home, or get along with other people: Very difficult  Today's GENARO-7 Score: 14     He states he has been more anxious and irritable ever since October.  He states he has been in a \"funk.\".  This is unlike him.  He states typically he is very active and exercises routinely.  He has not been exercising as he typically does.  He states he did hunt this fall as usual.  He is not feeling suicidal just mostly anxious and irritable.  He does feel down and depressed.  He has never been on meds for this that he can recall.  In reviewing his medications he was on bupropion in 2007/2008 timeframe.  He does not recall what this is for.    He continues to see [facility] for his restless leg syndrome and insomnia.  He is not taking any meds for insomnia because they have not been helpful.  He states he just does not sleep.  He saw GI for his constipation they told him to take more senna.  He is doing so but it is really not helping.  He also continues to have episodes of vertigo and headaches which she has had since his 40s.  Nothing seems to trigger the vertigo.  Sometimes he will become a little nauseated but does not have any vomiting with it.  When he has the headaches and vertigo he just needs to lay down.  That does seem to help.  He does not note that his vertigo is positional.  His appetite has been down and he has lost about 4 pounds since his last office visit in November.    Review of Systems   Psychiatric/Behavioral: The patient is nervous/anxious.       See PHQ 9 and GENARO 7 questionnaires for symptoms.       Objective    /81 (BP " "Location: Left arm, Patient Position: Sitting, Cuff Size: Adult Regular)   Pulse 73   Temp 98.8  F (37.1  C) (Temporal)   Ht 1.785 m (5' 10.28\")   Wt 78.2 kg (172 lb 8 oz)   SpO2 99%   BMI 24.56 kg/m    Body mass index is 24.56 kg/m .  Physical Exam   GENERAL: healthy, alert and no distress  EYES: Eyes grossly normal to inspection, PERRL and conjunctivae and sclerae normal  NECK: no adenopathy, no asymmetry, masses, or scars and thyroid normal to palpation  RESP: lungs clear to auscultation - no rales, rhonchi or wheezes  CV: regular rate and rhythm, normal S1 S2, no S3 or S4, no murmur, click or rub, no peripheral edema and peripheral pulses strong  MS: no gross musculoskeletal defects noted, no edema  NEURO: Normal strength and tone, mentation intact and speech normal  PSYCH: mentation appears normal, affect flat, fatigued, judgement and insight intact and appearance well groomed    Allied Health/Nurse Visit on 06/08/2022   Component Date Value Ref Range Status     SARS CoV2 PCR 06/08/2022 Negative  Negative Final    NEGATIVE: SARS-CoV-2 (COVID-19) RNA not detected, presumed negative.     Group A Strep antigen 06/08/2022 Negative  Negative Final     Group A strep by PCR 06/08/2022 Not Detected  Not Detected Final                   "

## 2023-01-16 ENCOUNTER — TRANSFERRED RECORDS (OUTPATIENT)
Dept: HEALTH INFORMATION MANAGEMENT | Facility: CLINIC | Age: 63
End: 2023-01-16

## 2023-01-16 DIAGNOSIS — F43.23 ADJUSTMENT DISORDER WITH MIXED ANXIETY AND DEPRESSED MOOD: ICD-10-CM

## 2023-01-16 DIAGNOSIS — F41.9 ANXIETY: ICD-10-CM

## 2023-01-16 RX ORDER — PAROXETINE 10 MG/1
10 TABLET, FILM COATED ORAL EVERY MORNING
Qty: 90 TABLET | Refills: 0 | Status: SHIPPED | OUTPATIENT
Start: 2023-01-16 | End: 2023-04-19

## 2023-01-16 NOTE — TELEPHONE ENCOUNTER
Pended medication below for [facility]. Requesting 90 day supply. RN unable to send to new pharmacy to due quantity request change.     Sivan MAGUIREN, RN  [facility]

## 2023-01-16 NOTE — TELEPHONE ENCOUNTER
PARoxetine (PAXIL) 10 MG tablet    Fax pharmacy message: Benjamín Mendoza is requesting this new 90 day prescription from [facility].

## 2023-01-25 ENCOUNTER — MYC MEDICAL ADVICE (OUTPATIENT)
Dept: FAMILY MEDICINE | Facility: CLINIC | Age: 63
End: 2023-01-25
Payer: COMMERCIAL

## 2023-02-27 ASSESSMENT — KOOS JR
STANDING UPRIGHT: MILD
STRAIGHTENING KNEE FULLY: MILD
KOOS JR SCORING: 68.28
TWISING OR PIVOTING ON KNEE: MODERATE
GOING UP OR DOWN STAIRS: MILD
HOW SEVERE IS YOUR KNEE STIFFNESS AFTER FIRST WAKING IN MORNING: MODERATE

## 2023-02-28 ENCOUNTER — OFFICE VISIT (OUTPATIENT)
Dept: ORTHOPEDICS | Facility: CLINIC | Age: 63
End: 2023-02-28
Payer: COMMERCIAL

## 2023-02-28 VITALS
DIASTOLIC BLOOD PRESSURE: 81 MMHG | HEIGHT: 69 IN | HEART RATE: 80 BPM | WEIGHT: 170 LBS | BODY MASS INDEX: 25.18 KG/M2 | SYSTOLIC BLOOD PRESSURE: 139 MMHG | RESPIRATION RATE: 18 BRPM

## 2023-02-28 DIAGNOSIS — M17.12 PRIMARY OSTEOARTHRITIS OF LEFT KNEE: Primary | ICD-10-CM

## 2023-02-28 PROCEDURE — 20610 DRAIN/INJ JOINT/BURSA W/O US: CPT | Mod: LT | Performed by: ORTHOPAEDIC SURGERY

## 2023-02-28 RX ORDER — METHYLPREDNISOLONE ACETATE 80 MG/ML
80 INJECTION, SUSPENSION INTRA-ARTICULAR; INTRALESIONAL; INTRAMUSCULAR; SOFT TISSUE
Status: SHIPPED | OUTPATIENT
Start: 2023-02-28

## 2023-02-28 RX ORDER — LIDOCAINE HYDROCHLORIDE 10 MG/ML
5 INJECTION, SOLUTION INFILTRATION; PERINEURAL
Status: SHIPPED | OUTPATIENT
Start: 2023-02-28

## 2023-02-28 RX ADMIN — METHYLPREDNISOLONE ACETATE 80 MG: 80 INJECTION, SUSPENSION INTRA-ARTICULAR; INTRALESIONAL; INTRAMUSCULAR; SOFT TISSUE at 09:03

## 2023-02-28 RX ADMIN — LIDOCAINE HYDROCHLORIDE 5 ML: 10 INJECTION, SOLUTION INFILTRATION; PERINEURAL at 09:03

## 2023-02-28 NOTE — PROGRESS NOTES
Large Joint Injection/Arthocentesis: L knee joint    Date/Time: 2/28/2023 9:03 AM  Performed by: Anish Garrett MD  Authorized by: Anish Garrett MD     Indications:  Pain  Needle Size:  22 G  Guidance: landmark guided    Location:  Knee      Medications:  5 mL lidocaine 1 %; 80 mg methylPREDNISolone 80 MG/ML  Outcome:  Tolerated well, no immediate complications  Procedure discussed: discussed risks, benefits, and alternatives    Consent Given by:  Patient  Timeout: timeout called immediately prior to procedure    Prep: patient was prepped and draped in usual sterile fashion

## 2023-02-28 NOTE — PROGRESS NOTES
Follow up left knee primary osteoarthritis.  Had arthroscopy in 2017 for medial meniscus tear.  Has osteoarthritis medial with bone-on-bone on [place] view.  Going bear hunting in [place] soon.  Last injection 8/25/22  Range of motion 0-135.    He  desires injection today of left knee(s).  Risks, benefits, potential complications and alternatives were discussed.   With the patient's consent, sterile prep was performed of left knee(s).  Left knee was injected with Depo Medrol 80 mg and lidocaine at anteromedial site.  Return to clinic as needed.

## 2023-02-28 NOTE — LETTER
2/28/2023         RE: Benjamín Mendoza  [address] [facility] [address]        Dear Colleague,    Thank you for referring your patient, Benjamín Mendoza, to the [facility]. Please see a copy of my visit note below.    Large Joint Injection/Arthocentesis: L knee joint    Date/Time: 2/28/2023 9:03 AM  Performed by: Anish Garrett MD  Authorized by: Anish Garrett MD     Indications:  Pain  Needle Size:  22 G  Guidance: landmark guided    Location:  Knee      Medications:  5 mL lidocaine 1 %; 80 mg methylPREDNISolone 80 MG/ML  Outcome:  Tolerated well, no immediate complications  Procedure discussed: discussed risks, benefits, and alternatives    Consent Given by:  Patient  Timeout: timeout called immediately prior to procedure    Prep: patient was prepped and draped in usual sterile fashion            Follow up left knee primary osteoarthritis.  Had arthroscopy in 2017 for medial meniscus tear.  Has osteoarthritis medial with bone-on-bone on [place] view.  Going bear hunting in [place] soon.  Last injection 8/25/22  Range of motion 0-135.    He  desires injection today of left knee(s).  Risks, benefits, potential complications and alternatives were discussed.   With the patient's consent, sterile prep was performed of left knee(s).  Left knee was injected with Depo Medrol 80 mg and lidocaine at anteromedial site.  Return to clinic as needed.         Again, thank you for allowing me to participate in the care of your patient.        Sincerely,        Anish Garrett MD

## 2023-04-13 DIAGNOSIS — F43.23 ADJUSTMENT DISORDER WITH MIXED ANXIETY AND DEPRESSED MOOD: ICD-10-CM

## 2023-04-13 DIAGNOSIS — F41.9 ANXIETY: ICD-10-CM

## 2023-04-17 ENCOUNTER — MYC MEDICAL ADVICE (OUTPATIENT)
Dept: FAMILY MEDICINE | Facility: CLINIC | Age: 63
End: 2023-04-17
Payer: COMMERCIAL

## 2023-04-17 NOTE — TELEPHONE ENCOUNTER
"Pending Prescriptions:                       Disp   Refills    PARoxetine (PAXIL) 10 MG tablet [Pharmacy *90 tab*3        Sig: TAKE 1 TABLET EVERY MORNING    Routing refill request to provider for review/approval because:  PHQ-9 score:        1/2/2023     9:25 AM   PHQ   PHQ-9 Total Score 15   Q9: Thoughts of better off dead/self-harm past 2 weeks Not at all                 Requested Prescriptions   Pending Prescriptions Disp Refills    PARoxetine (PAXIL) 10 MG tablet [Pharmacy Med Name: PAROXETINE HCL TABS 10MG] 90 tablet 3     Sig: TAKE 1 TABLET EVERY MORNING       SSRIs Protocol Failed - 4/13/2023  7:06 PM        Failed - PHQ-9 score less than 5 in past 6 months     Please review last PHQ-9 score.           Passed - Medication is active on med list        Passed - Patient is age 18 or older        Passed - Recent (6 mo) or future (30 days) visit within the authorizing provider's specialty     Patient had office visit in the last 6 months or has a visit in the next 30 days with authorizing provider or within the authorizing provider's specialty.  See \"Patient Info\" tab in inbasket, or \"Choose Columns\" in Meds & Orders section of the refill encounter.                       "

## 2023-04-17 NOTE — TELEPHONE ENCOUNTER
He needs virtual recheck please help him schedule this asap, prefer appt before refill if possible if not I can give him limited amount to get to appt  Aleida Larson PA-C

## 2023-04-19 ENCOUNTER — E-VISIT (OUTPATIENT)
Dept: FAMILY MEDICINE | Facility: CLINIC | Age: 63
End: 2023-04-19
Payer: COMMERCIAL

## 2023-04-19 DIAGNOSIS — J01.90 ACUTE NON-RECURRENT SINUSITIS, UNSPECIFIED LOCATION: ICD-10-CM

## 2023-04-19 DIAGNOSIS — F43.23 ADJUSTMENT DISORDER WITH MIXED ANXIETY AND DEPRESSED MOOD: ICD-10-CM

## 2023-04-19 DIAGNOSIS — F41.9 ANXIETY: Primary | ICD-10-CM

## 2023-04-19 DIAGNOSIS — J20.9 ACUTE BRONCHITIS WITH SYMPTOMS > 10 DAYS: ICD-10-CM

## 2023-04-19 PROCEDURE — 99421 OL DIG E/M SVC 5-10 MIN: CPT | Performed by: PHYSICIAN ASSISTANT

## 2023-04-19 RX ORDER — PAROXETINE 10 MG/1
TABLET, FILM COATED ORAL
Qty: 90 TABLET | Refills: 1 | Status: SHIPPED | OUTPATIENT
Start: 2023-04-19 | End: 2023-07-17

## 2023-04-19 RX ORDER — AZITHROMYCIN 250 MG/1
TABLET, FILM COATED ORAL
Qty: 6 TABLET | Refills: 0 | Status: SHIPPED | OUTPATIENT
Start: 2023-04-19 | End: 2023-04-24

## 2023-04-19 NOTE — TELEPHONE ENCOUNTER
Patient has read mychart on 4/19/2023  6:53 AM by Benjamín Mendoza; patient has not yet scheduled follow up on this medication but has evisit today for cough. Routing to provider to review.     Tabatha Simental CMA ([facility])

## 2023-05-14 DIAGNOSIS — E78.5 HYPERLIPIDEMIA, UNSPECIFIED HYPERLIPIDEMIA TYPE: ICD-10-CM

## 2023-05-14 NOTE — LETTER
May 18, 2023      Benjamín SOLOMON Reji  [address] [facility] [address]                  Aleida Wood stated she has sent a refill to the pharmacy for you but no further refills will be given as you are overdue for fasting labs. Please schedule a lab only visit to complete this on [employer] or by calling [phone].     Thank you,     Your [facility] Team

## 2023-05-15 NOTE — TELEPHONE ENCOUNTER
"Pending Prescriptions:                       Disp   Refills    atorvastatin (LIPITOR) 40 MG tablet [Pharm*90 tab*3        Sig: TAKE 1 TABLET DAILY    Routing refill request to provider for review/approval because:  Labs not current:      PHQ-9 score:        1/2/2023     9:25 AM   PHQ   PHQ-9 Total Score 15   Q9: Thoughts of better off dead/self-harm past 2 weeks Not at all         Requested Prescriptions   Pending Prescriptions Disp Refills    atorvastatin (LIPITOR) 40 MG tablet [Pharmacy Med Name: ATORVASTATIN TABS 40MG] 90 tablet 3     Sig: TAKE 1 TABLET DAILY       Statins Protocol Failed - 5/14/2023  2:47 PM        Failed - LDL on file in past 12 months     Recent Labs   Lab Test 08/05/20  0726   *               Passed - No abnormal creatine kinase in past 12 months     No lab results found.             Passed - Recent (12 mo) or future (30 days) visit within the authorizing provider's specialty     Patient has had an office visit with the authorizing provider or a provider within the authorizing providers department within the previous 12 mos or has a future within next 30 days. See \"Patient Info\" tab in inbasket, or \"Choose Columns\" in Meds & Orders section of the refill encounter.              Passed - Medication is active on med list        Passed - Patient is age 18 or older                   "

## 2023-05-16 DIAGNOSIS — E78.5 HYPERLIPIDEMIA, UNSPECIFIED HYPERLIPIDEMIA TYPE: ICD-10-CM

## 2023-05-16 RX ORDER — ATORVASTATIN CALCIUM 40 MG/1
TABLET, FILM COATED ORAL
Qty: 90 TABLET | Refills: 0 | Status: SHIPPED | OUTPATIENT
Start: 2023-05-16 | End: 2023-08-09

## 2023-05-16 RX ORDER — ATORVASTATIN CALCIUM 40 MG/1
TABLET, FILM COATED ORAL
Qty: 90 TABLET | Refills: 3 | OUTPATIENT
Start: 2023-05-16

## 2023-05-16 NOTE — TELEPHONE ENCOUNTER
Please call patient and help him schedule his lab appointment, he is way overdue for fasting labs.  Orders are already in I will approve 30-day supply but no refills until he completes his labs  Aleida Larson PA-C

## 2023-05-22 ENCOUNTER — PATIENT OUTREACH (OUTPATIENT)
Dept: CARE COORDINATION | Facility: CLINIC | Age: 63
End: 2023-05-22
Payer: COMMERCIAL

## 2023-06-09 ENCOUNTER — MYC MEDICAL ADVICE (OUTPATIENT)
Dept: FAMILY MEDICINE | Facility: CLINIC | Age: 63
End: 2023-06-09
Payer: COMMERCIAL

## 2023-06-09 NOTE — TELEPHONE ENCOUNTER
Please help patient schedule an appointment, can be virtual, to decide on changing his medication for him.  We also could do an evisit that he could send to me though I I would not respond to that until Monday.  Aleida Larson PA-C

## 2023-06-12 NOTE — TELEPHONE ENCOUNTER
Message read on 6/9/2023  4:28 PM by Benjamín Mendoza; closing.    Tabatha Simental CMA ([facility])

## 2023-06-20 ENCOUNTER — LAB (OUTPATIENT)
Dept: LAB | Facility: OTHER | Age: 63
End: 2023-06-20
Payer: COMMERCIAL

## 2023-06-20 DIAGNOSIS — E78.5 HYPERLIPIDEMIA, UNSPECIFIED HYPERLIPIDEMIA TYPE: ICD-10-CM

## 2023-06-20 DIAGNOSIS — Z12.5 SCREENING FOR PROSTATE CANCER: ICD-10-CM

## 2023-06-20 DIAGNOSIS — Z13.0 SCREENING FOR DEFICIENCY ANEMIA: ICD-10-CM

## 2023-06-20 DIAGNOSIS — Z13.1 SCREENING FOR DIABETES MELLITUS: ICD-10-CM

## 2023-06-20 LAB
ALBUMIN SERPL BCG-MCNC: 4.4 G/DL (ref 3.5–5.2)
ALP SERPL-CCNC: 72 U/L (ref 40–129)
ALT SERPL W P-5'-P-CCNC: 33 U/L (ref 0–70)
ANION GAP SERPL CALCULATED.3IONS-SCNC: 10 MMOL/L (ref 7–15)
AST SERPL W P-5'-P-CCNC: 29 U/L (ref 0–45)
BILIRUB SERPL-MCNC: 0.5 MG/DL
BUN SERPL-MCNC: 15.4 MG/DL (ref 8–23)
CALCIUM SERPL-MCNC: 9.3 MG/DL (ref 8.8–10.2)
CHLORIDE SERPL-SCNC: 103 MMOL/L (ref 98–107)
CHOLEST SERPL-MCNC: 178 MG/DL
CREAT SERPL-MCNC: 0.98 MG/DL (ref 0.67–1.17)
DEPRECATED HCO3 PLAS-SCNC: 27 MMOL/L (ref 22–29)
ERYTHROCYTE [DISTWIDTH] IN BLOOD BY AUTOMATED COUNT: 12 % (ref 10–15)
GFR SERPL CREATININE-BSD FRML MDRD: 88 ML/MIN/1.73M2
GLUCOSE SERPL-MCNC: 99 MG/DL (ref 70–99)
HCT VFR BLD AUTO: 44.5 % (ref 40–53)
HDLC SERPL-MCNC: 54 MG/DL
HGB BLD-MCNC: 14.7 G/DL (ref 13.3–17.7)
LDLC SERPL CALC-MCNC: 108 MG/DL
MCH RBC QN AUTO: 30.4 PG (ref 26.5–33)
MCHC RBC AUTO-ENTMCNC: 33 G/DL (ref 31.5–36.5)
MCV RBC AUTO: 92 FL (ref 78–100)
NONHDLC SERPL-MCNC: 124 MG/DL
PLATELET # BLD AUTO: 157 10E3/UL (ref 150–450)
POTASSIUM SERPL-SCNC: 4.3 MMOL/L (ref 3.4–5.3)
PROT SERPL-MCNC: 6.8 G/DL (ref 6.4–8.3)
PSA SERPL DL<=0.01 NG/ML-MCNC: 0.64 NG/ML (ref 0–4.5)
RBC # BLD AUTO: 4.83 10E6/UL (ref 4.4–5.9)
SODIUM SERPL-SCNC: 140 MMOL/L (ref 136–145)
TRIGL SERPL-MCNC: 78 MG/DL
WBC # BLD AUTO: 5.9 10E3/UL (ref 4–11)

## 2023-06-20 PROCEDURE — 80053 COMPREHEN METABOLIC PANEL: CPT

## 2023-06-20 PROCEDURE — 85027 COMPLETE CBC AUTOMATED: CPT

## 2023-06-20 PROCEDURE — G0103 PSA SCREENING: HCPCS

## 2023-06-20 PROCEDURE — 80061 LIPID PANEL: CPT

## 2023-06-20 PROCEDURE — 36415 COLL VENOUS BLD VENIPUNCTURE: CPT

## 2023-06-26 ENCOUNTER — TELEPHONE (OUTPATIENT)
Dept: FAMILY MEDICINE | Facility: CLINIC | Age: 63
End: 2023-06-26
Payer: COMMERCIAL

## 2023-06-26 NOTE — TELEPHONE ENCOUNTER
Received an incoming fax from [facility] regarding a refill request on Sertraline.  Reviewed his chart and looks like this was sent in on 06/12/23 for 60day supply.   Patient was unaware that this med had a refill at [facility]. Did  the first fill.   Advised him to [facility] at the time he follows up with Aleida about how the med is going.   Will hold off on this request until then due to early fill.

## 2023-07-11 DIAGNOSIS — F43.23 ADJUSTMENT DISORDER WITH MIXED ANXIETY AND DEPRESSED MOOD: ICD-10-CM

## 2023-07-11 DIAGNOSIS — F41.9 ANXIETY: ICD-10-CM

## 2023-07-12 NOTE — TELEPHONE ENCOUNTER
"Pending Prescriptions:                       Disp   Refills    sertraline (ZOLOFT) 50 MG tablet           30 tab*1        Sig: Take 0.5 tablets (25 mg) by mouth daily For 1 week           then increase to 1 pill daily    Routing refill request to provider for review/approval because:  Labs out of range:  PHQ  Update sig?    Requested Prescriptions   Pending Prescriptions Disp Refills    sertraline (ZOLOFT) 50 MG tablet 30 tablet 1     Sig: Take 0.5 tablets (25 mg) by mouth daily For 1 week then increase to 1 pill daily       SSRIs Protocol Failed - 7/12/2023  1:00 PM        Failed - PHQ-9 score less than 5 in past 6 months     Please review last PHQ-9 score.           Passed - Medication is active on med list        Passed - Patient is age 18 or older        Passed - Recent (6 mo) or future (30 days) visit within the authorizing provider's specialty     Patient had office visit in the last 6 months or has a visit in the next 30 days with authorizing provider or within the authorizing provider's specialty.  See \"Patient Info\" tab in inbasket, or \"Choose Columns\" in Meds & Orders section of the refill encounter.                     "

## 2023-07-13 ENCOUNTER — MYC MEDICAL ADVICE (OUTPATIENT)
Dept: FAMILY MEDICINE | Facility: CLINIC | Age: 63
End: 2023-07-13
Payer: COMMERCIAL

## 2023-07-13 NOTE — TELEPHONE ENCOUNTER
Due for a recheck, could do this morning if he has time virtually or he can send another evisit to let me know how he is doing  Aleida Larson PA-C

## 2023-07-17 ENCOUNTER — TRANSFERRED RECORDS (OUTPATIENT)
Dept: HEALTH INFORMATION MANAGEMENT | Facility: CLINIC | Age: 63
End: 2023-07-17

## 2023-07-17 ENCOUNTER — VIRTUAL VISIT (OUTPATIENT)
Dept: FAMILY MEDICINE | Facility: CLINIC | Age: 63
End: 2023-07-17
Payer: COMMERCIAL

## 2023-07-17 DIAGNOSIS — M25.50 MULTIPLE JOINT PAIN: Primary | ICD-10-CM

## 2023-07-17 DIAGNOSIS — M25.559 HIP PAIN, UNSPECIFIED LATERALITY: ICD-10-CM

## 2023-07-17 DIAGNOSIS — F43.23 ADJUSTMENT DISORDER WITH MIXED ANXIETY AND DEPRESSED MOOD: ICD-10-CM

## 2023-07-17 DIAGNOSIS — F41.9 ANXIETY: ICD-10-CM

## 2023-07-17 PROCEDURE — 99213 OFFICE O/P EST LOW 20 MIN: CPT | Mod: 95 | Performed by: PHYSICIAN ASSISTANT

## 2023-07-17 NOTE — PROGRESS NOTES
"Benjamín is a 63 year old who is being evaluated via a billable telephone visit.      What phone number would you like to be contacted at? [phone]   How would you like to obtain your AVS? Kimberlyn  Distant Location (provider location):  On-site    Assessment & Plan     Anxiety  Stable, he will continue current meds, consider dosage increase or change of med depending on how his side effect profile treatment success  - sertraline (ZOLOFT) 50 MG tablet; Take 1 tablet (50 mg) by mouth daily    Adjustment disorder with mixed anxiety and depressed mood  As above  - sertraline (ZOLOFT) 50 MG tablet; Take 1 tablet (50 mg) by mouth daily  Recheck in 6 months, sooner if needed    Multiple joint pain  Will rule out lyme as above  - Lyme Disease Total Abs Bld with Reflex to Confirm CLIA; Future    Hip pain, unspecified laterality  Will rule out lyme- he will make F2F appointment to evaluate   - Lyme Disease Total Abs Bld with Reflex to Confirm CLIA; Future       BMI:   Estimated body mass index is 25.1 kg/m  as calculated from the following:    Height as of 2/28/23: 1.753 m (5' 9\").    Weight as of 2/28/23: 77.1 kg (170 lb).   No assessed virtually        Aleida Larson PA-C  [facility]    Amirah Butts is a 63 year old, presenting for the following health issues:  Recheck Medication (Zoloft)        7/17/2023     9:14 AM   Additional Questions   Roomed by Danie BATISTA   Accompanied by Self         7/17/2023     9:14 AM   Patient Reported Additional Medications   Patient reports taking the following new medications None     History of Present Illness       Reason for visit:  Meds    He eats 2-3 servings of fruits and vegetables daily.He consumes 1 sweetened beverage(s) daily.He exercises with enough effort to increase his heart rate 20 to 29 minutes per day.  He exercises with enough effort to increase his heart rate 4 days per week.   He is taking medications regularly.       Medication Followup of " Zoloft    Taking Medication as prescribed: yes    Side Effects:  Body aches    Medication Helping Symptoms:  yes    Anxiety Follow-Up    How are you doing with your anxiety since your last visit? Improved Better, liking sertraline better than paroxetine.  Paroxetine made him like a zombie very slow and tired.  He has been achy since his May hunting trip and has had right hip pain.  He states in general he just feels older.  He has not had fevers or chills does not recall any particular tick bites.  He is still struggling with insomnia he is working with  Friday for this being tired and sleeping poorly makes him feel groggy irritable and he is still struggling with constipation.  Thankfully his restless leg syndrome is about 80% better with gabapentin/Horizant and methadone.  He thinks he may have some teeth clenching with this medication and achiness though he is not sure whether or not that is directly related to this med or not denies any diarrhea.    Are you having other symptoms that might be associated with anxiety? No    Have you had a significant life event? No     Are you feeling depressed? No    Do you have any concerns with your use of alcohol or other drugs? No    Social History     Tobacco Use     Smoking status: Never     Smokeless tobacco: Never     Tobacco comments:     no smokers in household   Vaping Use     Vaping Use: Never used   Substance Use Topics     Alcohol use: No     Drug use: No         6/20/2022     8:01 AM 1/2/2023     9:26 AM 6/11/2023     7:35 PM   GENARO-7 SCORE   Total Score  14 (moderate anxiety) 10 (moderate anxiety)   Total Score 3 14 10         5/1/2019     2:07 PM 6/20/2022     8:02 AM 1/2/2023     9:25 AM   PHQ   PHQ-9 Total Score 6 4 15   Q9: Thoughts of better off dead/self-harm past 2 weeks Not at all Not at all Not at all         1/2/2023     9:25 AM   Last PHQ-9   1.  Little interest or pleasure in doing things 2   2.  Feeling down, depressed, or hopeless 2   3.  Trouble  falling or staying asleep, or sleeping too much 3   4.  Feeling tired or having little energy 2   5.  Poor appetite or overeating 2   6.  Feeling bad about yourself 1   7.  Trouble concentrating 2   8.  Moving slowly or restless 1   Q9: Thoughts of better off dead/self-harm past 2 weeks 0   PHQ-9 Total Score 15         6/11/2023     7:35 PM   GENARO-7    1. Feeling nervous, anxious, or on edge 1   2. Not being able to stop or control worrying 2   3. Worrying too much about different things 2   4. Trouble relaxing 2   5. Being so restless that it is hard to sit still 1   6. Becoming easily annoyed or irritable 2   7. Feeling afraid, as if something awful might happen 0   GENARO-7 Total Score 10           Review of Systems   See PHQ 9 and GENARO 7 questionnaires for symptoms.        Objective           Vitals:  No vitals were obtained today due to virtual visit.    Physical Exam   healthy, alert and no distress  PSYCH: Alert and oriented times 3; coherent speech, normal   rate and volume, able to articulate logical thoughts, able   to abstract reason, no tangential thoughts, no hallucinations   or delusions  His affect is normal and pleasant  RESP: No cough, no audible wheezing, able to talk in full sentences  Remainder of exam unable to be completed due to telephone visits                Phone call duration: 8 minutes

## 2023-07-18 ENCOUNTER — LAB (OUTPATIENT)
Dept: LAB | Facility: OTHER | Age: 63
End: 2023-07-18
Payer: COMMERCIAL

## 2023-07-18 DIAGNOSIS — M25.559 HIP PAIN, UNSPECIFIED LATERALITY: ICD-10-CM

## 2023-07-18 DIAGNOSIS — M25.50 MULTIPLE JOINT PAIN: ICD-10-CM

## 2023-07-18 LAB — B BURGDOR IGG+IGM SER QL: 0.08

## 2023-07-18 PROCEDURE — 36415 COLL VENOUS BLD VENIPUNCTURE: CPT

## 2023-07-18 PROCEDURE — 86618 LYME DISEASE ANTIBODY: CPT

## 2023-07-19 ENCOUNTER — ANCILLARY PROCEDURE (OUTPATIENT)
Dept: GENERAL RADIOLOGY | Facility: CLINIC | Age: 63
End: 2023-07-19
Attending: PHYSICIAN ASSISTANT
Payer: COMMERCIAL

## 2023-07-19 ENCOUNTER — OFFICE VISIT (OUTPATIENT)
Dept: FAMILY MEDICINE | Facility: CLINIC | Age: 63
End: 2023-07-19
Payer: COMMERCIAL

## 2023-07-19 VITALS
BODY MASS INDEX: 25.71 KG/M2 | SYSTOLIC BLOOD PRESSURE: 142 MMHG | HEART RATE: 69 BPM | HEIGHT: 69 IN | WEIGHT: 173.6 LBS | RESPIRATION RATE: 12 BRPM | DIASTOLIC BLOOD PRESSURE: 74 MMHG | OXYGEN SATURATION: 100 % | TEMPERATURE: 98.7 F

## 2023-07-19 DIAGNOSIS — M25.551 HIP PAIN, RIGHT: ICD-10-CM

## 2023-07-19 DIAGNOSIS — M25.551 HIP PAIN, RIGHT: Primary | ICD-10-CM

## 2023-07-19 PROCEDURE — 73502 X-RAY EXAM HIP UNI 2-3 VIEWS: CPT | Mod: TC | Performed by: RADIOLOGY

## 2023-07-19 PROCEDURE — 99213 OFFICE O/P EST LOW 20 MIN: CPT | Performed by: PHYSICIAN ASSISTANT

## 2023-07-19 ASSESSMENT — PAIN SCALES - GENERAL: PAINLEVEL: MILD PAIN (3)

## 2023-07-19 NOTE — PROGRESS NOTES
"  Assessment & Plan     Hip pain, right  Pain is located on the right lateral aspect suspect trochanteric bursitis though internal derangement cannot be excluded of course  - XR Pelvis and Hip Right 1 View; Future  - Orthopedic  Referral; Future       BMI:   Estimated body mass index is 25.57 kg/m  as calculated from the following:    Height as of this encounter: 1.755 m (5' 9.09\").    Weight as of this encounter: 78.7 kg (173 lb 9.6 oz).           Aleida Larson PA-C  [facility] JEVON Butts is a 63 year old, presenting for the following health issues:  Joint Pain        7/19/2023    10:44 AM   Additional Questions   Roomed by Bina LAKHANI   Accompanied by None         7/19/2023    10:44 AM   Patient Reported Additional Medications   Patient reports taking the following new medications NA     History of Present Illness       Reason for visit:  Meds    He eats 2-3 servings of fruits and vegetables daily.He consumes 1 sweetened beverage(s) daily.He exercises with enough effort to increase his heart rate 20 to 29 minutes per day.  He exercises with enough effort to increase his heart rate 4 days per week.   He is taking medications regularly.       Pain History:  When did you first notice your pain? MAY, [place]  He [place] jogging [place] a logging road when he felt a snap or a pulling sensation.  He has had pain ever since.  His symptoms are progressively worsening the pain is a sharp spasming pain.  Have you seen this provider for your pain in the past?   Yes, though the hip pain is new  Where in your body do you have pain? RIGHT HIP   Are you seeing anyone else for your pain? No        5/1/2019     2:07 PM 6/20/2022     8:02 AM 1/2/2023     9:25 AM   PHQ-9 SCORE   PHQ-9 Total Score MyChart 6 (Mild depression)  15 (Moderately severe depression)   PHQ-9 Total Score 6 4 15           6/20/2022     8:01 AM 1/2/2023     9:26 AM 6/11/2023     7:35 PM   GENARO-7 " "SCORE   Total Score  14 (moderate anxiety) 10 (moderate anxiety)   Total Score 3 14 10               Chronic Pain Follow Up:    Location of pain: Right lateral hip, he now has started to notice some pain in his low right back which he attributes to walking differently due to his hip pain.  The pain in his hip radiates to his upper mid thigh to the inferior aspect of his low back.  Analgesia/pain control:    - Recent changes: Worsening   - Overall control: Tolerable with discomfort, ibuprofen is helpful but the pain does seem to increase first thing in the morning or if he has been at rest for any length of time.  Walking it out helps a little bit but the pain is persisting.   - Current treatments: ibuprofen helps until med wears off,    Adherence:     - Do you ever take more pain medicine than prescribed? No, only using ibuprofen over the   - When did you take your last dose of pain medicine?  NA   Adverse effects: No   PDMP Review     None        Last CSA Agreement:   CSA -- Patient Level:    CSA: None found at the patient level.       Last UDS: 1/23/2022    He is a very active ariella and this is really slowing him down.  He would like it addressed and improved prior to fall hunting season      Review of Systems   As documented above       Objective    BP (!) 142/74 (BP Location: Left arm, Patient Position: Chair, Cuff Size: Adult Regular)   Pulse 69   Temp 98.7  F (37.1  C) (Temporal)   Resp 12   Ht 1.755 m (5' 9.09\")   Wt 78.7 kg (173 lb 9.6 oz)   SpO2 100%   BMI 25.57 kg/m    Body mass index is 25.57 kg/m .  Physical Exam   GENERAL: healthy, alert and no distress  MS: Right hip has full range of motion, he is tender over the trochanteric bursa otherwise no other bony point tenderness  SKIN: no suspicious lesions or rashes   NEURO: Normal strength and tone, mentation intact and speech normal  BACK: no CVA tenderness, no paralumbar tenderness  PSYCH: mentation appears normal, affect normal/bright    No results " found for any visits on 07/19/23.  X-ray right hip-no substantial arthritic changes, no signs of any fractures, official report is pending

## 2023-07-24 ENCOUNTER — OFFICE VISIT (OUTPATIENT)
Dept: ORTHOPEDICS | Facility: CLINIC | Age: 63
End: 2023-07-24
Payer: COMMERCIAL

## 2023-07-24 ENCOUNTER — ANCILLARY PROCEDURE (OUTPATIENT)
Dept: GENERAL RADIOLOGY | Facility: CLINIC | Age: 63
End: 2023-07-24
Attending: PEDIATRICS
Payer: COMMERCIAL

## 2023-07-24 VITALS
BODY MASS INDEX: 25.48 KG/M2 | WEIGHT: 173 LBS | DIASTOLIC BLOOD PRESSURE: 79 MMHG | HEART RATE: 61 BPM | SYSTOLIC BLOOD PRESSURE: 136 MMHG

## 2023-07-24 DIAGNOSIS — M25.551 RIGHT HIP PAIN: ICD-10-CM

## 2023-07-24 DIAGNOSIS — M25.551 RIGHT HIP PAIN: Primary | ICD-10-CM

## 2023-07-24 DIAGNOSIS — M16.11 ARTHRITIS OF RIGHT HIP: ICD-10-CM

## 2023-07-24 DIAGNOSIS — M53.3 SACROILIAC JOINT PAIN: ICD-10-CM

## 2023-07-24 PROCEDURE — 99203 OFFICE O/P NEW LOW 30 MIN: CPT | Performed by: PEDIATRICS

## 2023-07-24 PROCEDURE — 72170 X-RAY EXAM OF PELVIS: CPT | Mod: TC | Performed by: RADIOLOGY

## 2023-07-24 NOTE — LETTER
7/24/2023         RE: Benjamín Mendoza  [address] [facility] [address]        Dear Colleague,    Thank you for referring your patient, Benjamín Mendoza, to the [facility]. Please see a copy of my visit note below.    ASSESSMENT & PLAN    Benjamín was seen today for pain.    Diagnoses and all orders for this visit:    Right hip pain  -     XR Pelvis 1/2 Views; Future  -     Orthopedic  Referral; Future  -     Physical Therapy Referral; Future    Arthritis of right hip  -     Orthopedic  Referral; Future  -     Physical Therapy Referral; Future    Sacroiliac joint pain  -     Physical Therapy Referral; Future      This issue is acute and Unchanged.      We discussed these other possible diagnosis: Likely mild hip arthritis, likely SI Joint component as well.    We discussed the following treatment options: symptom treatment, activity modification/rest, imaging, rehab and referral. Following discussion, plan: will trial hip joint injection and physical therapy first step.    Plan:  - Today's Plan of Care:  Rehab: Physical Therapy: [facility] - [phone]  Referral for US guided right hip injection    Discussed activity considerations and other supportive care including Ice/Heat, OTC and other topical medications as needed.    -We also discussed other future treatment options:  MRI (right hip or spine)    Follow Up: 6 - 8 weeks    Concerning signs and symptoms were reviewed and all questions were answered at this time.    Thanks for the opportunity to participate in the care of this patient, I will keep you updated on their progress.    CC: BART Tovar MD [facility]  Sports Medicine Physician  [facility]    -----  Chief Complaint   Patient presents with     Right Hip - Pain       SUBJECTIVE  Benjamín Mendoza is a/an 63 year old male who is seen in consultation at the request of Aleida Larson for evaluation of of  right hip pain.     The patient is seen with granddaughter.    Onset: 2.5 month(s) ago. Patient describes injury as he was hunting in [place] and was coming down a logging road and was carrying a 30lb backpack. He was walking pretty fast and all the sudden had acute pain in his right hip. Maybe caught himself waling. Has become gradually worse over the past few months.   Location of Pain: right lateral hip and posterior back and radiates down the lateral thigh at times  Worsened by: stairs, treadmill, sit to stand  Better with: laying down, ibuprofen, ice   Treatments tried: rest/activity avoidance, ice, and ibuprofen  Associated symptoms: numbness, tingling, weakness of right hip, and feeling of instability    Orthopedic/Surgical history: NO  Social History/Occupation: retired       REVIEW OF SYSTEMS:  Review of Systems    OBJECTIVE:  /79   Pulse 61   Wt 78.5 kg (173 lb)   BMI 25.48 kg/m     General: healthy, alert and in no distress  Skin: no suspicious lesions or rash.  CV: distal perfusion intact  Resp: normal respiratory effort without conversational dyspnea   Psych: normal mood and affect  Gait: NORMAL  Neuro: Normal light sensory exam of lower extremity    Bilateral hip exam  Inspection:      no edema or ecchymosis in hip area    Tender:      mild lateral right and SI joint bilaterally    Non Tender:      remainder of the hip area bilateral    ROM:     Range of motion limited by in internal and external rotation on the right    Strength:      flexion 5/5 bilateral       extension 5/5 bilateral       abduction 5/5 bilateral       adduction 5/5 bilateral    Sensation:      grossly intact in hip and thigh    Special Tests:      positive (+) VALENTIN right       positive (+) FADIR right      RADIOLOGY:  Final results and radiologist's interpretation, available in the [facility] health record.  Images were reviewed with the patient in the office today.  My personal interpretation of the performed imaging:    AP  pelvis weightbearing today reviewed along with AP pelvis nonweightbearing and right lateral hip x-rays from 7/19/2023 -mild bilateral hip degenerative changes, mild SI joint degenerative changes    Review PCP note  Review of the result(s) of each unique test - XR             Again, thank you for allowing me to participate in the care of your patient.        Sincerely,        Laurel Naylor MD

## 2023-07-24 NOTE — PATIENT INSTRUCTIONS
We discussed these other possible diagnosis: Likely mild hip arthritis, likely SI Joint component as well.    We discussed the following treatment options: symptom treatment, activity modification/rest, imaging, rehab and referral. Following discussion, plan: will trial hip joint injection and physical therapy first step.    Plan:  - Today's Plan of Care:  [facility] [phone]  Referral for US guided right hip injection    Discussed activity considerations and other supportive care including Ice/Heat, OTC and other topical medications as needed.    -We also discussed other future treatment options:  MRI (right hip or spine)    Follow Up: 6 - 8 weeks    If you have any further questions for your physician or physician s care team you can call [phone] and use option 3 to leave a voice message.

## 2023-07-24 NOTE — PROGRESS NOTES
ASSESSMENT & PLAN    Benjamín was seen today for pain.    Diagnoses and all orders for this visit:    Right hip pain  -     XR Pelvis 1/2 Views; Future  -     Orthopedic  Referral; Future  -     Physical Therapy Referral; Future    Arthritis of right hip  -     Orthopedic  Referral; Future  -     Physical Therapy Referral; Future    Sacroiliac joint pain  -     Physical Therapy Referral; Future      This issue is acute and Unchanged.      We discussed these other possible diagnosis: Likely mild hip arthritis, likely SI Joint component as well.    We discussed the following treatment options: symptom treatment, activity modification/rest, imaging, rehab and referral. Following discussion, plan: will trial hip joint injection and physical therapy first step.    Plan:  - Today's Plan of Care:  Rehab: Physical Therapy: [facility] - [phone]  Referral for US guided right hip injection    Discussed activity considerations and other supportive care including Ice/Heat, OTC and other topical medications as needed.    -We also discussed other future treatment options:  MRI (right hip or spine)    Follow Up: 6 - 8 weeks    Concerning signs and symptoms were reviewed and all questions were answered at this time.    Thanks for the opportunity to participate in the care of this patient, I will keep you updated on their progress.    CC: BART Tovar MD [facility]  Sports Medicine Physician  [facility]    -----  Chief Complaint   Patient presents with    Right Hip - Pain       SUBJECTIVE  Benjamín Mendoza is a/an 63 year old male who is seen in consultation at the request of Aleida Larson for evaluation of of right hip pain.     The patient is seen with granddaughter.    Onset: 2.5 month(s) ago. Patient describes injury as he was hunting in [place] and was coming down a logging road and was carrying a 30lb backpack. He was walking pretty fast and all the sudden had acute  pain in his right hip. Maybe caught himself waling. Has become gradually worse over the past few months.   Location of Pain: right lateral hip and posterior back and radiates down the lateral thigh at times  Worsened by: stairs, treadmill, sit to stand  Better with: laying down, ibuprofen, ice   Treatments tried: rest/activity avoidance, ice, and ibuprofen  Associated symptoms: numbness, tingling, weakness of right hip, and feeling of instability    Orthopedic/Surgical history: NO  Social History/Occupation: retired       REVIEW OF SYSTEMS:  Review of Systems    OBJECTIVE:  /79   Pulse 61   Wt 78.5 kg (173 lb)   BMI 25.48 kg/m     General: healthy, alert and in no distress  Skin: no suspicious lesions or rash.  CV: distal perfusion intact  Resp: normal respiratory effort without conversational dyspnea   Psych: normal mood and affect  Gait: NORMAL  Neuro: Normal light sensory exam of lower extremity    Bilateral hip exam  Inspection:      no edema or ecchymosis in hip area    Tender:      mild lateral right and SI joint bilaterally    Non Tender:      remainder of the hip area bilateral    ROM:     Range of motion limited by in internal and external rotation on the right    Strength:      flexion 5/5 bilateral       extension 5/5 bilateral       abduction 5/5 bilateral       adduction 5/5 bilateral    Sensation:      grossly intact in hip and thigh    Special Tests:      positive (+) VALENTIN right       positive (+) FADIR right      RADIOLOGY:  Final results and radiologist's interpretation, available in the [facility] health record.  Images were reviewed with the patient in the office today.  My personal interpretation of the performed imaging:    AP pelvis weightbearing today reviewed along with AP pelvis nonweightbearing and right lateral hip x-rays from 7/19/2023 -mild bilateral hip degenerative changes, mild SI joint degenerative changes    Review PCP note  Review of the result(s) of each unique test - XR

## 2023-07-26 ENCOUNTER — TELEPHONE (OUTPATIENT)
Dept: ORTHOPEDICS | Facility: CLINIC | Age: 63
End: 2023-07-26
Payer: COMMERCIAL

## 2023-07-26 NOTE — TELEPHONE ENCOUNTER
Called patient to confirm location of physical therapy.  [facility] is [facility].      This order is sent to Fax; [phone]    Julia Hdez ATC, CSCS  Dr. Naylor's Clinical Coordinator  [facility]

## 2023-07-26 NOTE — TELEPHONE ENCOUNTER
[facility] Call Center    Phone Message    May a detailed message be left on voicemail: yes     Reason for Call: Other: Hello, Can you please fax referral for PT to [facility] email  [email or web address]. Please call pt when it has been sent. Thank you, Jeanette     Action Taken: Other: Wy    Travel Screening: Not Applicable

## 2023-08-01 ENCOUNTER — TRANSFERRED RECORDS (OUTPATIENT)
Dept: HEALTH INFORMATION MANAGEMENT | Facility: CLINIC | Age: 63
End: 2023-08-01

## 2023-08-09 DIAGNOSIS — K21.9 GASTROESOPHAGEAL REFLUX DISEASE WITHOUT ESOPHAGITIS: ICD-10-CM

## 2023-08-09 DIAGNOSIS — E78.5 HYPERLIPIDEMIA, UNSPECIFIED HYPERLIPIDEMIA TYPE: ICD-10-CM

## 2023-08-09 RX ORDER — ATORVASTATIN CALCIUM 40 MG/1
TABLET, FILM COATED ORAL
Qty: 90 TABLET | Refills: 2 | Status: SHIPPED | OUTPATIENT
Start: 2023-08-09 | End: 2024-04-08

## 2023-08-17 ENCOUNTER — TRANSFERRED RECORDS (OUTPATIENT)
Dept: HEALTH INFORMATION MANAGEMENT | Facility: CLINIC | Age: 63
End: 2023-08-17
Payer: COMMERCIAL

## 2023-08-19 ENCOUNTER — HEALTH MAINTENANCE LETTER (OUTPATIENT)
Age: 63
End: 2023-08-19

## 2023-09-12 ENCOUNTER — TRANSFERRED RECORDS (OUTPATIENT)
Dept: HEALTH INFORMATION MANAGEMENT | Facility: CLINIC | Age: 63
End: 2023-09-12
Payer: COMMERCIAL

## 2023-10-05 ENCOUNTER — MYC MEDICAL ADVICE (OUTPATIENT)
Dept: FAMILY MEDICINE | Facility: CLINIC | Age: 63
End: 2023-10-05

## 2023-10-05 ENCOUNTER — ANCILLARY PROCEDURE (OUTPATIENT)
Dept: GENERAL RADIOLOGY | Facility: CLINIC | Age: 63
End: 2023-10-05
Attending: FAMILY MEDICINE
Payer: COMMERCIAL

## 2023-10-05 ENCOUNTER — OFFICE VISIT (OUTPATIENT)
Dept: FAMILY MEDICINE | Facility: CLINIC | Age: 63
End: 2023-10-05
Payer: COMMERCIAL

## 2023-10-05 VITALS
BODY MASS INDEX: 25.42 KG/M2 | TEMPERATURE: 99.2 F | WEIGHT: 171.6 LBS | OXYGEN SATURATION: 100 % | SYSTOLIC BLOOD PRESSURE: 128 MMHG | DIASTOLIC BLOOD PRESSURE: 82 MMHG | HEIGHT: 69 IN | RESPIRATION RATE: 13 BRPM | HEART RATE: 67 BPM

## 2023-10-05 DIAGNOSIS — M54.16 LUMBAR RADICULOPATHY: Primary | ICD-10-CM

## 2023-10-05 DIAGNOSIS — S46.002A INJURY OF LEFT ROTATOR CUFF, INITIAL ENCOUNTER: ICD-10-CM

## 2023-10-05 PROCEDURE — 73030 X-RAY EXAM OF SHOULDER: CPT | Mod: TC | Performed by: RADIOLOGY

## 2023-10-05 PROCEDURE — 99214 OFFICE O/P EST MOD 30 MIN: CPT | Performed by: FAMILY MEDICINE

## 2023-10-05 RX ORDER — METHYLPREDNISOLONE 4 MG
TABLET, DOSE PACK ORAL
Qty: 21 TABLET | Refills: 0 | Status: SHIPPED | OUTPATIENT
Start: 2023-10-05 | End: 2023-12-21

## 2023-10-05 RX ORDER — METHYLPREDNISOLONE 4 MG
TABLET, DOSE PACK ORAL
Qty: 21 TABLET | Refills: 0 | Status: SHIPPED | OUTPATIENT
Start: 2023-10-05 | End: 2023-10-05

## 2023-10-05 ASSESSMENT — PAIN SCALES - GENERAL: PAINLEVEL: MODERATE PAIN (4)

## 2023-10-05 ASSESSMENT — PATIENT HEALTH QUESTIONNAIRE - PHQ9
SUM OF ALL RESPONSES TO PHQ QUESTIONS 1-9: 9
SUM OF ALL RESPONSES TO PHQ QUESTIONS 1-9: 9
10. IF YOU CHECKED OFF ANY PROBLEMS, HOW DIFFICULT HAVE THESE PROBLEMS MADE IT FOR YOU TO DO YOUR WORK, TAKE CARE OF THINGS AT HOME, OR GET ALONG WITH OTHER PEOPLE: SOMEWHAT DIFFICULT

## 2023-10-05 NOTE — PROGRESS NOTES
Assessment & Plan     Lumbar radiculopathy  - MR Lumbar Spine w/o Contrast; Future  - Physical Therapy Referral; Future    Injury of left rotator cuff, initial encounter  - Physical Therapy Referral; Future  - Orthopedic  Referral; Future  - methylPREDNISolone (MEDROL DOSEPAK) 4 MG tablet therapy pack; Follow Package Directions      Abdoulaye Reynolds MD  [facility] JEVON Butts is a 63 year old, presenting for the following health issues:  Pain        10/5/2023    10:35 AM   Additional Questions   Roomed by Bina LAKHANI   Accompanied by None         10/5/2023    10:35 AM   Patient Reported Additional Medications   Patient reports taking the following new medications NA       History of Present Illness       Reason for visit:  Left shoulder pain and right hip pain  Symptom onset:  More than a month  Symptoms include:  No strength in my left arm and my right hip is still numb and tingling.  Symptom progression:  Staying the same  Had these symptoms before:  No    He eats 2-3 servings of fruits and vegetables daily.He consumes 0 sweetened beverage(s) daily.He exercises with enough effort to increase his heart rate 20 to 29 minutes per day.  He exercises with enough effort to increase his heart rate 3 or less days per week.   He is taking medications regularly.           Pain History:  When did you first notice your pain? April 2023   Have you seen this provider for your pain in the past? Yes   Where in your body do you have pain? Right hip and left shoulder   Are you seeing anyone else for your pain? Yes - physical therapy for hip         6/20/2022     8:02 AM 1/2/2023     9:25 AM 10/5/2023     8:47 AM   PHQ-9 SCORE   PHQ-9 Total Score MyChart  15 (Moderately severe depression) 9 (Mild depression)   PHQ-9 Total Score 4 15 9           6/20/2022     8:01 AM 1/2/2023     9:26 AM 6/11/2023     7:35 PM   GENARO-7 SCORE   Total Score  14 (moderate anxiety) 10 (moderate anxiety)   Total Score  "3 14 10         Review of Systems   Constitutional, HEENT, cardiovascular, pulmonary, GI, , musculoskeletal, neuro, skin, endocrine and psych systems are negative, except as otherwise noted.      Objective    /82 (BP Location: Left arm, Patient Position: Chair, Cuff Size: Adult Regular)   Pulse 67   Temp 99.2  F (37.3  C) (Temporal)   Resp 13   Ht 1.756 m (5' 9.13\")   Wt 77.8 kg (171 lb 9.6 oz)   SpO2 100%   BMI 25.24 kg/m    Body mass index is 25.24 kg/m .  Physical Exam   GENERAL: healthy, alert and no distress  NECK: no adenopathy, no asymmetry, masses, or scars and thyroid normal to palpation  RESP: lungs clear to auscultation - no rales, rhonchi or wheezes  CV: regular rate and rhythm, normal S1 S2, no S3 or S4, no murmur, click or rub, no peripheral edema and peripheral pulses strong  ABDOMEN: soft, nontender, no hepatosplenomegaly, no masses and bowel sounds normal  MS: no gross musculoskeletal defects noted, no edema. Patient on Methadone so diminished pain reflex.        Results for orders placed or performed in visit on 10/05/23   XR Shoulder Left G/E 3 Views     Status: None    Narrative    XR SHOULDER LEFT G/E 3 VIEWS   10/5/2023 12:00 PM     HISTORY: Injury of left rotator cuff, initial encounter  COMPARISON: None.       Impression    IMPRESSION: No acute fracture or dislocation. Mild degenerative  arthrosis of the AC joint.    REFUGIO LEA MD         SYSTEM ID:  [id number]           "

## 2023-10-09 ENCOUNTER — TELEPHONE (OUTPATIENT)
Dept: FAMILY MEDICINE | Facility: CLINIC | Age: 63
End: 2023-10-09
Payer: COMMERCIAL

## 2023-10-09 NOTE — TELEPHONE ENCOUNTER
Order/Referral Request    Who is requesting: PATIENT    Orders being requested: PT    Reason service is needed/diagnosis: LOW BACK AND LEFT SHOULDER     When are orders needed by: ASAP    Has this been discussed with Provider: Yes    Does patient have a preference on a Group/Provider/Facility?   [facility]   Does patient have an appointment scheduled?: NO    Where to send orders: Fax   Patient calling back with the fax number  P# [phone]    Could we send this information to you in [employer] or would you prefer to receive a phone call?:   Patient would prefer a phone call   Okay to leave a detailed message?: Yes at Cell number on file:    Telephone Information:   Mobile [phone]

## 2023-10-12 ENCOUNTER — HOSPITAL ENCOUNTER (OUTPATIENT)
Dept: MRI IMAGING | Facility: CLINIC | Age: 63
Discharge: HOME | End: 2023-10-12
Attending: FAMILY MEDICINE | Admitting: FAMILY MEDICINE
Payer: COMMERCIAL

## 2023-10-12 DIAGNOSIS — M54.16 LUMBAR RADICULOPATHY: ICD-10-CM

## 2023-10-12 PROCEDURE — 72148 MRI LUMBAR SPINE W/O DYE: CPT

## 2023-10-17 ENCOUNTER — TRANSFERRED RECORDS (OUTPATIENT)
Dept: HEALTH INFORMATION MANAGEMENT | Facility: CLINIC | Age: 63
End: 2023-10-17
Payer: COMMERCIAL

## 2023-10-20 ENCOUNTER — TELEPHONE (OUTPATIENT)
Dept: FAMILY MEDICINE | Facility: CLINIC | Age: 63
End: 2023-10-20
Payer: COMMERCIAL

## 2023-10-20 NOTE — TELEPHONE ENCOUNTER
Forms/Letter Request    Type of form/letter:  physical therapy consult    Have you been seen for this request: Yes     Do we have the form/letter: Yes    Who is the form from? [facility]     Where did/will the form come from? form was faxed in    When is form/letter needed by: As soon as you are able complete    How would you like the form/letter returned: Fax : [phone]    Patient Notified form requests are processed in 3-5 business days:No    Could we send this information to you in [employer] or would you prefer to receive a phone call?:   Patient would like to be contacted via [employer]

## 2023-10-30 ASSESSMENT — KOOS JR
HOW SEVERE IS YOUR KNEE STIFFNESS AFTER FIRST WAKING IN MORNING: SEVERE
TWISING OR PIVOTING ON KNEE: SEVERE
RISING FROM SITTING: SEVERE
BENDING TO THE FLOOR TO PICK UP OBJECT: MODERATE
GOING UP OR DOWN STAIRS: MODERATE
STANDING UPRIGHT: MILD
STRAIGHTENING KNEE FULLY: MILD
KOOS JR SCORING: 50.01

## 2023-10-31 ENCOUNTER — OFFICE VISIT (OUTPATIENT)
Dept: ORTHOPEDICS | Facility: CLINIC | Age: 63
End: 2023-10-31
Payer: COMMERCIAL

## 2023-10-31 VITALS
HEART RATE: 69 BPM | DIASTOLIC BLOOD PRESSURE: 82 MMHG | HEIGHT: 69 IN | RESPIRATION RATE: 18 BRPM | SYSTOLIC BLOOD PRESSURE: 128 MMHG | BODY MASS INDEX: 25.18 KG/M2 | WEIGHT: 170 LBS

## 2023-10-31 DIAGNOSIS — M17.12 PRIMARY OSTEOARTHRITIS OF LEFT KNEE: Primary | ICD-10-CM

## 2023-10-31 PROCEDURE — 20610 DRAIN/INJ JOINT/BURSA W/O US: CPT | Mod: LT | Performed by: ORTHOPAEDIC SURGERY

## 2023-10-31 RX ORDER — METHYLPREDNISOLONE ACETATE 80 MG/ML
80 INJECTION, SUSPENSION INTRA-ARTICULAR; INTRALESIONAL; INTRAMUSCULAR; SOFT TISSUE
Status: SHIPPED | OUTPATIENT
Start: 2023-10-31

## 2023-10-31 RX ORDER — LIDOCAINE HYDROCHLORIDE 10 MG/ML
5 INJECTION, SOLUTION INFILTRATION; PERINEURAL
Status: SHIPPED | OUTPATIENT
Start: 2023-10-31

## 2023-10-31 RX ADMIN — LIDOCAINE HYDROCHLORIDE 5 ML: 10 INJECTION, SOLUTION INFILTRATION; PERINEURAL at 09:44

## 2023-10-31 RX ADMIN — METHYLPREDNISOLONE ACETATE 80 MG: 80 INJECTION, SUSPENSION INTRA-ARTICULAR; INTRALESIONAL; INTRAMUSCULAR; SOFT TISSUE at 09:44

## 2023-10-31 NOTE — LETTER
10/31/2023         RE: Benjamín Mendoza  [address]  [facility] [address]        Dear Colleague,    Thank you for referring your patient, Benjamín Mendoza, to the [facility]. Please see a copy of my visit note below.    Follow up left knee primary osteoarthritis.  Had arthroscopy in 2017 for medial meniscus tear.  Has osteoarthritis medial with bone-on-bone on Casper view.  Last injection 2/28/23  Range of motion 0-135.    He  desires injection today of left knee(s).  Risks, benefits, potential complications and alternatives were discussed.   With the patient's consent, sterile prep was performed of left knee(s).  Left knee was injected with Depo Medrol 80 mg and lidocaine at anteromedial site.  Return to clinic as needed.       Large Joint Injection/Arthocentesis: L knee joint    Date/Time: 10/31/2023 9:44 AM    Performed by: Anish Garrett MD  Authorized by: Anish Garrett MD    Indications:  Pain  Needle Size:  22 G  Guidance: landmark guided    Location:  Knee      Medications:  5 mL lidocaine 1 %; 80 mg methylPREDNISolone 80 MG/ML  Outcome:  Tolerated well, no immediate complications  Procedure discussed: discussed risks, benefits, and alternatives    Consent Given by:  Patient  Timeout: timeout called immediately prior to procedure    Prep: patient was prepped and draped in usual sterile fashion        Again, thank you for allowing me to participate in the care of your patient.        Sincerely,        Anish Garrett MD

## 2023-10-31 NOTE — PROGRESS NOTES
Large Joint Injection/Arthocentesis: L knee joint    Date/Time: 10/31/2023 9:44 AM    Performed by: Anish Garrett MD  Authorized by: Anish Garrett MD    Indications:  Pain  Needle Size:  22 G  Guidance: landmark guided    Location:  Knee      Medications:  5 mL lidocaine 1 %; 80 mg methylPREDNISolone 80 MG/ML  Outcome:  Tolerated well, no immediate complications  Procedure discussed: discussed risks, benefits, and alternatives    Consent Given by:  Patient  Timeout: timeout called immediately prior to procedure    Prep: patient was prepped and draped in usual sterile fashion

## 2023-10-31 NOTE — PROGRESS NOTES
Follow up left knee primary osteoarthritis.  Had arthroscopy in 2017 for medial meniscus tear.  Has osteoarthritis medial with bone-on-bone on Casper view.  Last injection 2/28/23  Range of motion 0-135.    He  desires injection today of left knee(s).  Risks, benefits, potential complications and alternatives were discussed.   With the patient's consent, sterile prep was performed of left knee(s).  Left knee was injected with Depo Medrol 80 mg and lidocaine at anteromedial site.  Return to clinic as needed.

## 2023-11-20 ENCOUNTER — TRANSFERRED RECORDS (OUTPATIENT)
Dept: HEALTH INFORMATION MANAGEMENT | Facility: CLINIC | Age: 63
End: 2023-11-20
Payer: COMMERCIAL

## 2023-11-21 ENCOUNTER — TELEPHONE (OUTPATIENT)
Dept: FAMILY MEDICINE | Facility: CLINIC | Age: 63
End: 2023-11-21
Payer: COMMERCIAL

## 2023-11-21 NOTE — TELEPHONE ENCOUNTER
Form requires provider to pick recommendation on treatment or to discontinue and a signature. Form in provider bin for completion.    Tabatha Simental CMA ([facility])

## 2023-11-21 NOTE — TELEPHONE ENCOUNTER
Forms/Letter Request    Type of form/letter:  Physical Therapy Progress note     Have you been seen for this request: Yes     Do we have the form/letter: Yes    Who is the form from? Physical therapy [facility]  (if other please explain)    Where did/will the form come from? form was faxed in    When is form/letter needed by: at your earliest convenience     How would you like the form/letter returned: Fax : [phone]    Patient Notified form requests are processed in 3-5 business days:No    Could we send this information to you in [employer] or would you prefer to receive a phone call?:   Patient would like to be contacted via [employer]

## 2023-12-21 DIAGNOSIS — F43.23 ADJUSTMENT DISORDER WITH MIXED ANXIETY AND DEPRESSED MOOD: ICD-10-CM

## 2023-12-21 DIAGNOSIS — F41.9 ANXIETY: ICD-10-CM

## 2024-01-05 ENCOUNTER — TRANSFERRED RECORDS (OUTPATIENT)
Dept: HEALTH INFORMATION MANAGEMENT | Facility: CLINIC | Age: 64
End: 2024-01-05
Payer: COMMERCIAL

## 2024-02-18 ASSESSMENT — PATIENT HEALTH QUESTIONNAIRE - PHQ9
SUM OF ALL RESPONSES TO PHQ QUESTIONS 1-9: 13
10. IF YOU CHECKED OFF ANY PROBLEMS, HOW DIFFICULT HAVE THESE PROBLEMS MADE IT FOR YOU TO DO YOUR WORK, TAKE CARE OF THINGS AT HOME, OR GET ALONG WITH OTHER PEOPLE: VERY DIFFICULT
SUM OF ALL RESPONSES TO PHQ QUESTIONS 1-9: 13

## 2024-02-19 ENCOUNTER — VIRTUAL VISIT (OUTPATIENT)
Dept: FAMILY MEDICINE | Facility: OTHER | Age: 64
End: 2024-02-19
Payer: COMMERCIAL

## 2024-02-19 DIAGNOSIS — J20.9 ACUTE BRONCHITIS WITH SYMPTOMS > 10 DAYS: Primary | ICD-10-CM

## 2024-02-19 PROCEDURE — 99213 OFFICE O/P EST LOW 20 MIN: CPT | Mod: 95 | Performed by: PHYSICIAN ASSISTANT

## 2024-02-19 RX ORDER — BENZONATATE 200 MG/1
200 CAPSULE ORAL 3 TIMES DAILY PRN
Qty: 30 CAPSULE | Refills: 0 | Status: SHIPPED | OUTPATIENT
Start: 2024-02-19 | End: 2024-05-22

## 2024-02-19 RX ORDER — AZITHROMYCIN 250 MG/1
TABLET, FILM COATED ORAL
Qty: 6 TABLET | Refills: 0 | Status: SHIPPED | OUTPATIENT
Start: 2024-02-19 | End: 2024-05-22

## 2024-02-19 ASSESSMENT — ANXIETY QUESTIONNAIRES
GAD7 TOTAL SCORE: 3
3. WORRYING TOO MUCH ABOUT DIFFERENT THINGS: NOT AT ALL
GAD7 TOTAL SCORE: 3
8. IF YOU CHECKED OFF ANY PROBLEMS, HOW DIFFICULT HAVE THESE MADE IT FOR YOU TO DO YOUR WORK, TAKE CARE OF THINGS AT HOME, OR GET ALONG WITH OTHER PEOPLE?: NOT DIFFICULT AT ALL
2. NOT BEING ABLE TO STOP OR CONTROL WORRYING: NOT AT ALL
1. FEELING NERVOUS, ANXIOUS, OR ON EDGE: NOT AT ALL
7. FEELING AFRAID AS IF SOMETHING AWFUL MIGHT HAPPEN: NOT AT ALL
5. BEING SO RESTLESS THAT IT IS HARD TO SIT STILL: SEVERAL DAYS
IF YOU CHECKED OFF ANY PROBLEMS ON THIS QUESTIONNAIRE, HOW DIFFICULT HAVE THESE PROBLEMS MADE IT FOR YOU TO DO YOUR WORK, TAKE CARE OF THINGS AT HOME, OR GET ALONG WITH OTHER PEOPLE: NOT DIFFICULT AT ALL
4. TROUBLE RELAXING: SEVERAL DAYS
7. FEELING AFRAID AS IF SOMETHING AWFUL MIGHT HAPPEN: NOT AT ALL
6. BECOMING EASILY ANNOYED OR IRRITABLE: SEVERAL DAYS

## 2024-02-19 NOTE — PROGRESS NOTES
"Instructions Relayed to Patient by Virtual Roomer:     Patient is active on [employer]:   Relayed following to patient: \"It looks like you are active on [employer], are you able to join the visit this way? If not, do you need us to send you a link now or would you like your provider to send a link via text or email when they are ready to initiate the visit?\"    Reminded patient to ensure they were logged on to virtual visit by arrival time listed. Documented in appointment notes if patient had flexibility to initiate visit sooner than arrival time. If pediatric virtual visit, ensured pediatric patient along with parent/guardian will be present for video visit.     Patient offered the website [email or web address] and/or phone number to [employer] Help line: [phone]      Benjamín is a 63 year old who is being evaluated via a billable video visit.      How would you like to obtain your AVS? Yakaz  If the video visit is dropped, the invitation should be resent by: Text to cell phone: [phone]  Will anyone else be joining your video visit? No    Assessment & Plan     ICD-10-CM    1. Acute bronchitis with symptoms > 10 days  J20.9 azithromycin (ZITHROMAX) 250 MG tablet     benzonatate (TESSALON) 200 MG capsule        - Patient with symptoms for >10 days   - Due to duration and HPI, will treat for likely bronchitis   - Discussed antibiotic use, duration, and side effects  - Will give Tessalon for cough      Reviewed use and side effects   - Encouraged rest and fluids   - If persists, recommend being seen in clinic       Review of the result(s) of each unique test - none    Diagnosis or treatment significantly limited by social determinants of health - none    16 minutes spent on the date of the encounter doing chart review, history and exam, documentation and further activities as noted above    The patient indicates understanding of these issues and agrees with the plan.    Follow up: PRN, advised in person " if doesn't improve by the end of the week     Jakob Killian PA-C  [facility] - Rochelle Butts is a 63 year old, presenting for the following health issues:  Cough    History of Present Illness       Reason for visit:  Deep chest cough  Symptom onset:  1-2 weeks ago  Symptoms include:  Cough  Symptom intensity:  Moderate  Symptom progression:  Worsening  Had these symptoms before:  No    He eats 2-3 servings of fruits and vegetables daily.He consumes 1 sweetened beverage(s) daily.He exercises with enough effort to increase his heart rate 30 to 60 minutes per day.  He exercises with enough effort to increase his heart rate 4 days per week.   He is taking medications regularly.       Acute Illness  Acute illness concerns: Cough   Onset/Duration: 10-12 days   Symptoms:  Fever: No  Chills/Sweats: No  Headache (location?): YES  Sinus Pressure: No  Conjunctivitis:  No  Ear Pain: no  Rhinorrhea: No  Congestion: YES  Sore Throat: No  Cough: YES-productive of green sputum  Wheeze: YES  Decreased Appetite: YES  Nausea: No  Vomiting: No  Diarrhea: No  Dysuria/Freq.: No  Dysuria or Hematuria: No  Fatigue/Achiness: YES- both  Sick/Strep Exposure: YES- grandkids   Therapies tried and outcome: Tylenol - helped some     - Deepest cough ever had   - Did have stomach flu last week   - Lots of coughing green chunks yesterday             Review of Systems  Constitutional, HEENT, cardiovascular, pulmonary, gi and gu systems are negative, except as otherwise noted.      Objective       Vitals:  No vitals were obtained today due to virtual visit.    Physical Exam   GENERAL: alert and no distress, mild-moderately ill appearing   EYES: Eyes grossly normal to inspection.  No discharge or erythema, or obvious scleral/conjunctival abnormalities.  RESP: Wet intermittent cough, no audible wheeze or visible cyanosis.    SKIN: Visible skin clear. No significant rash, abnormal pigmentation or  lesions.  NEURO: Cranial nerves grossly intact.  Mentation and speech appropriate for age.  PSYCH: Appropriate affect, tone, and pace of words, sounds very congested     Diagnostics: none         Video-Visit Details    Type of service:  Video Visit   Originating Location (pt. Location): Home  Distant Location (provider location):  Off-site  Platform used for Video Visit: Carlos  Signed Electronically by: Susan Killian PA-C

## 2024-03-19 ENCOUNTER — OFFICE VISIT (OUTPATIENT)
Dept: ORTHOPEDICS | Facility: CLINIC | Age: 64
End: 2024-03-19
Payer: COMMERCIAL

## 2024-03-19 VITALS — BODY MASS INDEX: 25.18 KG/M2 | WEIGHT: 170 LBS | RESPIRATION RATE: 18 BRPM | HEIGHT: 69 IN

## 2024-03-19 DIAGNOSIS — M17.12 PRIMARY OSTEOARTHRITIS OF LEFT KNEE: Primary | ICD-10-CM

## 2024-03-19 DIAGNOSIS — G25.81 RESTLESS LEGS SYNDROME (RLS): Primary | ICD-10-CM

## 2024-03-19 PROCEDURE — 20610 DRAIN/INJ JOINT/BURSA W/O US: CPT | Mod: LT | Performed by: ORTHOPAEDIC SURGERY

## 2024-03-19 RX ORDER — LIDOCAINE HYDROCHLORIDE 10 MG/ML
5 INJECTION, SOLUTION INFILTRATION; PERINEURAL
Status: SHIPPED | OUTPATIENT
Start: 2024-03-19

## 2024-03-19 RX ORDER — METHYLPREDNISOLONE ACETATE 80 MG/ML
80 INJECTION, SUSPENSION INTRA-ARTICULAR; INTRALESIONAL; INTRAMUSCULAR; SOFT TISSUE
Status: SHIPPED | OUTPATIENT
Start: 2024-03-19

## 2024-03-19 RX ADMIN — LIDOCAINE HYDROCHLORIDE 5 ML: 10 INJECTION, SOLUTION INFILTRATION; PERINEURAL at 08:17

## 2024-03-19 RX ADMIN — METHYLPREDNISOLONE ACETATE 80 MG: 80 INJECTION, SUSPENSION INTRA-ARTICULAR; INTRALESIONAL; INTRAMUSCULAR; SOFT TISSUE at 08:17

## 2024-03-19 NOTE — PROGRESS NOTES
Large Joint Injection/Arthocentesis: L knee joint    Date/Time: 3/19/2024 8:17 AM    Performed by: Anish Garrett MD  Authorized by: Anish Garrett MD    Indications:  Pain  Needle Size:  22 G  Guidance: landmark guided    Location:  Knee      Medications:  5 mL lidocaine 1 %; 80 mg methylPREDNISolone 80 MG/ML  Outcome:  Tolerated well, no immediate complications  Procedure discussed: discussed risks, benefits, and alternatives    Consent Given by:  Patient  Timeout: timeout called immediately prior to procedure    Prep: patient was prepped and draped in usual sterile fashion

## 2024-03-19 NOTE — LETTER
3/19/2024         RE: Benjamín Mendoza  [address] [facility] [id number]        Dear Colleague,    Thank you for referring your patient, Benjamín Mendoza, to the [facility]. Please see a copy of my visit note below.    Large Joint Injection/Arthocentesis: L knee joint    Date/Time: 3/19/2024 8:17 AM    Performed by: Anish Garrett MD  Authorized by: Anish Garrett MD    Indications:  Pain  Needle Size:  22 G  Guidance: landmark guided    Location:  Knee      Medications:  5 mL lidocaine 1 %; 80 mg methylPREDNISolone 80 MG/ML  Outcome:  Tolerated well, no immediate complications  Procedure discussed: discussed risks, benefits, and alternatives    Consent Given by:  Patient  Timeout: timeout called immediately prior to procedure    Prep: patient was prepped and draped in usual sterile fashion          Follow up left knee primary osteoarthritis.  Had arthroscopy in 2017 for medial meniscus tear.  Has osteoarthritis medial with bone-on-bone on Casper view.  Last injection 10/31/23  Range of motion 0-135.    He  desires injection today of left knee(s).  Risks, benefits, potential complications and alternatives were discussed.   With the patient's consent, sterile prep was performed of left knee(s).  Left knee was injected with Depo Medrol 80 mg and lidocaine at anteromedial site with freezing spray.  Return to clinic as needed.         Again, thank you for allowing me to participate in the care of your patient.        Sincerely,        Anish Garrett MD

## 2024-03-19 NOTE — PROGRESS NOTES
Follow up left knee primary osteoarthritis.  Had arthroscopy in 2017 for medial meniscus tear.  Has osteoarthritis medial with bone-on-bone on Casper view.  Last injection 10/31/23  Range of motion 0-135.    He  desires injection today of left knee(s).  Risks, benefits, potential complications and alternatives were discussed.   With the patient's consent, sterile prep was performed of left knee(s).  Left knee was injected with Depo Medrol 80 mg and lidocaine at anteromedial site with freezing spray.  Return to clinic as needed.

## 2024-03-20 ENCOUNTER — OFFICE VISIT (OUTPATIENT)
Dept: FAMILY MEDICINE | Facility: CLINIC | Age: 64
End: 2024-03-20
Payer: COMMERCIAL

## 2024-03-20 VITALS
DIASTOLIC BLOOD PRESSURE: 80 MMHG | RESPIRATION RATE: 18 BRPM | BODY MASS INDEX: 27.11 KG/M2 | OXYGEN SATURATION: 99 % | TEMPERATURE: 97.9 F | WEIGHT: 183 LBS | HEART RATE: 68 BPM | SYSTOLIC BLOOD PRESSURE: 130 MMHG | HEIGHT: 69 IN

## 2024-03-20 DIAGNOSIS — G25.81 RESTLESS LEGS SYNDROME (RLS): ICD-10-CM

## 2024-03-20 DIAGNOSIS — J01.01 ACUTE RECURRENT MAXILLARY SINUSITIS: Primary | ICD-10-CM

## 2024-03-20 DIAGNOSIS — N48.6 PEYRONIE DISEASE: ICD-10-CM

## 2024-03-20 LAB — FERRITIN SERPL-MCNC: 117 NG/ML (ref 31–409)

## 2024-03-20 PROCEDURE — 82728 ASSAY OF FERRITIN: CPT | Performed by: PHYSICIAN ASSISTANT

## 2024-03-20 PROCEDURE — 36415 COLL VENOUS BLD VENIPUNCTURE: CPT | Performed by: PHYSICIAN ASSISTANT

## 2024-03-20 PROCEDURE — 99213 OFFICE O/P EST LOW 20 MIN: CPT | Performed by: PHYSICIAN ASSISTANT

## 2024-03-20 RX ORDER — DOXYCYCLINE 100 MG/1
100 CAPSULE ORAL 2 TIMES DAILY
Qty: 20 CAPSULE | Refills: 0 | Status: SHIPPED | OUTPATIENT
Start: 2024-03-20 | End: 2024-03-30

## 2024-03-20 ASSESSMENT — PAIN SCALES - GENERAL: PAINLEVEL: NO PAIN (0)

## 2024-03-20 ASSESSMENT — ENCOUNTER SYMPTOMS: COUGH: 1

## 2024-03-20 NOTE — PROGRESS NOTES
"  Assessment & Plan     Acute recurrent maxillary sinusitis  Will treat with a different antibiotic, he should add Flonase nasal spray and sinus rinses, should drink plenty of fluids and get extra rest as well  - doxycycline hyclate (VIBRAMYCIN) 100 MG capsule; Take 1 capsule (100 mg) by mouth 2 times daily for 10 days    Peyronie disease  Will refer to urology  - Adult Urology  Referral; Future    Restless legs syndrome (RLS)  The provider  - Ferritin      This chart documentation was completed in part with Dragon voice recognition software.  Documentation is reviewed after completion, however, some words and grammatical errors may remain.  Aleida Larson PA-C        BMI  Estimated body mass index is 27.02 kg/m  as calculated from the following:    Height as of this encounter: 1.753 m (5' 9\").    Weight as of this encounter: 83 kg (183 lb).   Weight management plan: Though his BMI is 27, this man is not overweight      This chart documentation was completed in part with Dragon voice recognition software.  Documentation is reviewed after completion, however, some words and grammatical errors may remain.  Aleida Larson PA-C      Amirah Butts is a 64 year old, presenting for the following health issues:  Cough      3/20/2024     7:42 AM   Additional Questions   Roomed by VE     Cough    History of Present Illness       Reason for visit:  Cough and flem, very tired.    He eats 2-3 servings of fruits and vegetables daily.He consumes 1 sweetened beverage(s) daily.He exercises with enough effort to increase his heart rate 10 to 19 minutes per day.  He exercises with enough effort to increase his heart rate 4 days per week.   He is taking medications regularly.         Acute Illness  Acute illness concerns: cough  Onset/Duration: February--on February 19 he had a visit where he was prescribed Z-Gerard and Tessalon cough Perles.  His symptoms did improve to some extent but never resolved.  Symptoms:  Fever: " "No  Chills/Sweats: YES, he will have hot flashes in the evening  Headache (location?): YES  Sinus Pressure: YES, maxillary  Conjunctivitis:  No  Ear Pain: no  Rhinorrhea: YES yellow to thick green  Congestion: YES  Sore Throat: No  Cough: YES-productive of yellow sputum, he states he is not coughing because he has a lung infection it seems as if he is coughing to clear his nasal secretions  Wheeze: YES  Decreased Appetite: No  Nausea: No  Vomiting: No  Diarrhea: No  Dysuria/Freq.: No  Dysuria or Hematuria: No  Fatigue/Achiness: YES, he is very fatigued, he is also noticing low energy.  He does struggle with sleep and is actually working with sleep psychology and sleep doctor for this.  Sick/Strep Exposure: No  Therapies tried and outcome: was treated with z-jacquelyn which helped but didn't take the symptoms completely away    Also, he started noticing a bend in his erection since fall 2023.  He is not having any urinary symptoms however does comment that this is uncomfortable and makes intercourse painful.  He has looked it up online and believes he has Peyronie's disease    Review of Systems  As documented above       Objective    /80 (BP Location: Left arm, Patient Position: Chair, Cuff Size: Adult Regular)   Pulse 68   Temp 97.9  F (36.6  C) (Temporal)   Resp 18   Ht 1.753 m (5' 9\")   Wt 83 kg (183 lb)   SpO2 99%   BMI 27.02 kg/m    Body mass index is 27.02 kg/m .  Physical Exam   GENERAL: alert and no distress  EYES: Eyes grossly normal to inspection, PERRL and conjunctivae and sclerae normal  HENT: normal cephalic/atraumatic, ear canals and TM's normal, nose and mouth without ulcers or lesions, nasal mucosa edematous , oropharynx clear, oral mucous membranes moist, and sinuses: maxillary tenderness on bilaterally  NECK: no adenopathy, no asymmetry, masses, or scars  RESP: lungs clear to auscultation - no rales, rhonchi or wheezes  CV: regular rate and rhythm, normal S1 S2, no S3 or S4, no murmur, click " or rub, no peripheral edema  ABDOMEN: soft, nontender, no hepatosplenomegaly, no masses and bowel sounds normal  MS: no gross musculoskeletal defects noted, no edema  PSYCH: mentation appears normal, affect normal/bright    Discussed doing a chest x-ray though his lungs are entirely clear so we opted not to        Signed Electronically by: Aleida Larson PA-C

## 2024-04-01 ENCOUNTER — TELEPHONE (OUTPATIENT)
Dept: FAMILY MEDICINE | Facility: CLINIC | Age: 64
End: 2024-04-01
Payer: COMMERCIAL

## 2024-04-01 DIAGNOSIS — F41.9 ANXIETY: ICD-10-CM

## 2024-04-01 DIAGNOSIS — F43.23 ADJUSTMENT DISORDER WITH MIXED ANXIETY AND DEPRESSED MOOD: ICD-10-CM

## 2024-04-02 NOTE — TELEPHONE ENCOUNTER
I did send in patient a 90-day supply of his sertraline, we should do a virtual recheck on this med for further refills  Aleida Larson PA-C

## 2024-04-03 ENCOUNTER — MYC MEDICAL ADVICE (OUTPATIENT)
Dept: FAMILY MEDICINE | Facility: CLINIC | Age: 64
End: 2024-04-03
Payer: COMMERCIAL

## 2024-04-03 NOTE — TELEPHONE ENCOUNTER
Kimberlyn sent to patient.     ANDREZ VargasN, RN  [facility] ~ Registered Nurse  Clinic Triage ~ [place] [facility] [place]  April 3, 2024

## 2024-04-03 NOTE — TELEPHONE ENCOUNTER
Patient responding to [employer] sent 4/2/24 regarding needing virtual follow up appointment for further for refills of Sertraline.    Sertraline and anxiety not discussed at recent visit according to chart notes, please advise.     ANDREZ VargasN, RN  [facility] ~ Registered Nurse  Clinic Triage ~ [place] [facility] [place]  April 3, 2024

## 2024-04-03 NOTE — TELEPHONE ENCOUNTER
Please let patient know, yes we did just see him but I saw him for illness not to recheck his med.  I did give him a 90-day supply so there is no rush to complete this visit.  We can push it out to June or July.  Aleida Larson PA-C

## 2024-04-07 DIAGNOSIS — K21.9 GASTROESOPHAGEAL REFLUX DISEASE WITHOUT ESOPHAGITIS: ICD-10-CM

## 2024-04-07 DIAGNOSIS — E78.5 HYPERLIPIDEMIA, UNSPECIFIED HYPERLIPIDEMIA TYPE: ICD-10-CM

## 2024-04-08 RX ORDER — ATORVASTATIN CALCIUM 40 MG/1
TABLET, FILM COATED ORAL
Qty: 90 TABLET | Refills: 0 | Status: SHIPPED | OUTPATIENT
Start: 2024-04-08 | End: 2024-07-12

## 2024-04-10 ENCOUNTER — ANCILLARY PROCEDURE (OUTPATIENT)
Dept: GENERAL RADIOLOGY | Facility: CLINIC | Age: 64
End: 2024-04-10
Attending: ORTHOPAEDIC SURGERY
Payer: COMMERCIAL

## 2024-04-10 ENCOUNTER — OFFICE VISIT (OUTPATIENT)
Dept: ORTHOPEDICS | Facility: CLINIC | Age: 64
End: 2024-04-10
Payer: COMMERCIAL

## 2024-04-10 VITALS
DIASTOLIC BLOOD PRESSURE: 70 MMHG | SYSTOLIC BLOOD PRESSURE: 120 MMHG | BODY MASS INDEX: 25.93 KG/M2 | TEMPERATURE: 98.1 F | HEART RATE: 70 BPM | WEIGHT: 175.6 LBS | RESPIRATION RATE: 16 BRPM

## 2024-04-10 DIAGNOSIS — M17.12 PRIMARY OSTEOARTHRITIS OF LEFT KNEE: ICD-10-CM

## 2024-04-10 DIAGNOSIS — M17.12 PRIMARY OSTEOARTHRITIS OF LEFT KNEE: Primary | ICD-10-CM

## 2024-04-10 PROCEDURE — 99213 OFFICE O/P EST LOW 20 MIN: CPT | Performed by: ORTHOPAEDIC SURGERY

## 2024-04-10 PROCEDURE — 73564 X-RAY EXAM KNEE 4 OR MORE: CPT | Mod: TC | Performed by: RADIOLOGY

## 2024-04-10 RX ORDER — BUPIVACAINE HYDROCHLORIDE 5 MG/ML
3 INJECTION, SOLUTION EPIDURAL; INTRACAUDAL ONCE
Status: CANCELLED | OUTPATIENT
Start: 2024-04-10 | End: 2024-04-10

## 2024-04-10 RX ORDER — TRIAMCINOLONE ACETONIDE 40 MG/ML
40 INJECTION, SUSPENSION INTRA-ARTICULAR; INTRAMUSCULAR ONCE
Status: CANCELLED | OUTPATIENT
Start: 2024-04-10 | End: 2024-04-10

## 2024-04-10 ASSESSMENT — PAIN SCALES - GENERAL: PAINLEVEL: MILD PAIN (2)

## 2024-04-10 NOTE — LETTER
4/10/2024         RE: Benjamín Mendoza  [address] [facility] [address]        Dear Colleague,    Thank you for referring your patient, Benjamín Mendoza, to the [facility]. Please see a copy of my visit note below.    ORTHOPEDIC CONSULT      Chief Complaint: Benjamín Mendoza is a 64 year old male who is being seen for   Chief Complaint   Patient presents with     Left Knee - Pain       History of Present Illness:   Today's visit:  Epic reviwed: left knee steroid injection done 3/19/24 with Dr Garrett. At that visit received a steroid injection. He reports maybe 10% improvement at first. Now overall the knee is more painful.  Reports causing a limp. Will be sore by the end of the day. The knee is worsening.  No new injuries.    Treatments tried in total: multiple steroid injections, nsaids, rest, activity modification, watchful waiting, icing.          8/16/21 visit:  Overall doing fairly well.  He does have some achiness and soreness medially.  He has been staying very active.  He has been on the treadmill with a backpack getting ready for upcoming elk hunt.        January 28, 2021 visit:  Did well with his last injection.  To provide extra relief.  Few days ago started having return of symptoms.  Medial sided.  Nonradiating.  No new injuries or traumas.  He like an injection     September 23, 2020 visit:  At his last visit he had a intra-articular steroid injection.  He had X relief with the injection.  He continues to do well although certainly there is some discomfort returning.  He will be leaving for an elk hunt coming up here shortly.  He would like repeat injection.     May 28, 2020 visit:  No new injuries.  Same pain is in the past.  Is requesting an injection.  December 18, 2019 visit:  Returns for medial sided knee pain.  Same pain is in the past.  Over the weekend he was working on his car.  No specific injuries.  However started having increasing pain.  Rates the pain is  moderate to severe.  Causing him to limp.  Previous injections have been very helpful.  He is requesting an injection.  August 8, 2019 visit:  Returns for his left knee.  Pain along the medial side.  Previous injection has provided him relief.  He is been taken Tylenol ibuprofen for other issues.  He reports for a few weeks it was extremely flared up and he called for an appointment.  By the time he is able to get in the pain has improved.  Continues to have some discomfort medially.  Also complains of stiffness.  April 17, 2019 visit:   Returns for left medial knee pain.  Different pain than in the past.  Rates it is moderate to severe at times.  Describes it as aching and sharp.  Nonradiating.  No new injuries.  On his last visit he received an intra-articular injection which provided a little less than 6 weeks relief.  He is been taken ibuprofen.  He presents with his wife.     January 2, 2018 visit:  Returns for continued left medial knee achiness.  He reports the sharp pain from the meniscus tear preoperatively is gone.  Now more of a gradual achy soreness.  It is never significantly improved since his last visit.  It has actually worsened some.  Ibuprofen has not been helpful.  He rates it as mild to moderate at times.  It does cause him to limp at times.     Patient's past medical, surgical, social and family histories reviewed.     Past Medical History:   Diagnosis Date     Chronic back pain     percocet, spinal fluid leak dx but never visible     Chronic headaches      Esophageal reflux      GERD (gastroesophageal reflux disease)      Motion sickness      CARY (obstructive sleep apnea) 03/2009    AHI 26 Ndir 84%     Primary osteoarthritis of left knee 04/17/2019     RLS (restless legs syndrome)        Past Surgical History:   Procedure Laterality Date     ARTHROSCOPY KNEE WITH MEDIAL MENISCECTOMY Left 6/8/2018    Procedure: ARTHROSCOPY KNEE WITH MEDIAL MENISCECTOMY;  Left knee arthroscopy with arthroscopic  partial meniscectomy;  Surgeon: Dane Blanco DO;  Location: [facility] OR     COLONOSCOPY  06/12/07     COLONOSCOPY N/A 7/12/2017    Procedure: COLONOSCOPY;  colonoscopy;  Surgeon: Lake Mueller MD;  Location: [facility] GI     HC TRABECULOPLASTY BY LASER SURGERY  2000    LASIK     SEPTOPLASTY, TURBINOPLASTY, COMBINED N/A 4/26/2016    Procedure: COMBINED SEPTOPLASTY, TURBINOPLASTY;  Surgeon: Ken Molina MD;  Location: [facility] OR     WISDOM ST GUIDEWIRE         Medications:  Current Outpatient Medications   Medication Sig Dispense Refill     atorvastatin (LIPITOR) 40 MG tablet TAKE 1 TABLET DAILY 90 tablet 0     HORIZANT 600 MG tablet Take 600 mg by mouth At Bedtime       methadone (DOLOPHINE) 5 MG tablet 10 mg       omeprazole (PRILOSEC) 20 MG DR capsule TAKE 1 CAPSULE DAILY 90 capsule 0     SENNA-PLUS 8.6-50 MG tablet daily as needed       sertraline (ZOLOFT) 50 MG tablet TAKE 1 TABLET DAILY (NEEDS VIRTUAL RECHECK FOR MORE REFILLS) 90 tablet 0     azithromycin (ZITHROMAX) 250 MG tablet Two tablets first day, then one tablet daily for four days. (Patient not taking: Reported on 3/20/2024) 6 tablet 0     benzonatate (TESSALON) 200 MG capsule Take 1 capsule (200 mg) by mouth 3 times daily as needed for cough (Patient not taking: Reported on 3/20/2024) 30 capsule 0     Multiple Vitamin (ONE-A-DAY MENS PO) Take 1 tablet by mouth every evening        Current Facility-Administered Medications   Medication Dose Route Frequency Provider Last Rate Last Admin     lidocaine 1 % injection 5 mL  5 mL   Anish Garrett MD   5 mL at 03/19/24 0817     lidocaine 1 % injection 5 mL  5 mL   Anish Garrett MD   5 mL at 10/31/23 0944     lidocaine 1 % injection 5 mL  5 mL   Anish Garrett MD   5 mL at 02/28/23 0903     lidocaine 1 % injection 5 mL  5 mL   Anish Garrett MD   5 mL at 08/25/22 1326     methylPREDNISolone (DEPO-Medrol) injection 80 mg  80 mg   Anish Garrett MD   80 mg at 03/19/24 0817      methylPREDNISolone (DEPO-Medrol) injection 80 mg  80 mg   Anish Garrett MD   80 mg at 10/31/23 0944     methylPREDNISolone (DEPO-MEDROL) injection 80 mg  80 mg   Anish Garrett MD   80 mg at 23 0903     methylPREDNISolone (DEPO-MEDROL) injection 80 mg  80 mg   Anish Garrett MD   80 mg at 22 1326     methylPREDNISolone (DEPO-MEDROL) injection 80 mg  80 mg   Anish Garrett MD   80 mg at 22 0916       Allergies   Allergen Reactions     Triptans Nausea and Vomiting     Migraine medications       Social History     Occupational History     Occupation: Elevator service     Comment: [employer]   Tobacco Use     Smoking status: Never     Passive exposure: Never     Smokeless tobacco: Never     Tobacco comments:     no smokers in household   Vaping Use     Vaping status: Never Used   Substance and Sexual Activity     Alcohol use: No     Drug use: No     Sexual activity: Yes     Partners: Female     Birth control/protection: Female Surgical     Comment: Wife had a tubal, 5 children       Family History   Problem Relation Age of Onset     Cerebrovascular Disease Mother 66        aneursym -  at 66     Other - See Comments Sister         Half sister, heart problems unsure what kind     Other - See Comments Brother         Was in a fire     No Known Problems Maternal Grandmother      No Known Problems Maternal Grandfather      Arthritis Maternal Aunt      Arthritis Maternal Uncle      Muscular Disorder Daughter 18        Small fiber neuropathy     Unknown/Adopted Father      Unknown/Adopted Paternal Grandmother      Unknown/Adopted Paternal Grandfather      Hypertension No family hx of      Diabetes No family hx of        REVIEW OF SYSTEMS  10 point review systems performed otherwise negative as noted as per history of present illness.    Physical Exam:  Vitals: /70   Pulse 70   Temp 98.1  F (36.7  C) (Temporal)   Resp 16   Wt 79.7 kg (175 lb 9.6 oz)   BMI  25.93 kg/m    BMI= Body mass index is 25.93 kg/m .  Constitutional: healthy, alert and no acute distress   Psychiatric: mentation appears normal and affect normal/bright  NEURO: no focal deficits  RESP: Normal with easy respirations and no use of accessory muscles to breathe, no audible wheezing or retractions  CV: No peripheral edema         Regular rate and rhythm by palpation  SKIN: No erythema, rashes, excoriation, or breakdown. No evidence of infection.   JOINT/EXTREMITIES:left knee: no effusion. AROM 3-105. Stiffness and tightness with motion. Varus deformity. No pain with motion. Tender along medial joint line. No instability with valgus or varus. Patella tracks midline. Calf soft non-tender.    GAIT: not tested     Diagnostic Modalities:  Left knee x-ray: No acute fractures or dislocations.  Bone-on-bone arthritis medial compartment.  Mild lateral compartment narrowing.  Mild patellofemoral lateral facet narrowing  Independent visualization of the images was performed.      Impression: left knee primary osteoarthritis    Plan:  All of the above pertinent physical exam and imaging modalities findings was reviewed with Benjamín.  Last injection failed to provide meaningful relief. Has tried extensive conservative therapy without lasting improvement and reports knee continues to worsen overall.     We lengthy discussion regarding his pain.  We discussed how to clinically correlated to his radiographs showing end-stage osteoarthritis.  Unfortunate now the pain is refractory to conservative care and it is impacting the quality his life.  His pain at rest.  Pain with activities.  Given his issues we discussed left total knee arthroplasty.  At this point given some thought and let us know.        Return to clinic PRN, or sooner as needed for changes.  Re-x-ray on return: No    Scribed by:  Randell Sosa APRN, CNP  8:14 AM  4/10/2024  The information in this document, created by a scribe for me, accurately reflects the  services I personally performed and the decisions made by me. I have reviewed and approved this document for accuracy    Dane Blanco, DO         Again, thank you for allowing me to participate in the care of your patient.        Sincerely,        Dane Blanco, DO

## 2024-04-10 NOTE — PROGRESS NOTES
ORTHOPEDIC CONSULT      Chief Complaint: Benjamín Mendoza is a 64 year old male who is being seen for   Chief Complaint   Patient presents with    Left Knee - Pain       History of Present Illness:   Today's visit:  Epic reviwed: left knee steroid injection done 3/19/24 with Dr Garrett. At that visit received a steroid injection. He reports maybe 10% improvement at first. Now overall the knee is more painful.  Reports causing a limp. Will be sore by the end of the day. The knee is worsening.  No new injuries.    Treatments tried in total: multiple steroid injections, nsaids, rest, activity modification, watchful waiting, icing.          8/16/21 visit:  Overall doing fairly well.  He does have some achiness and soreness medially.  He has been staying very active.  He has been on the treadmill with a backpack getting ready for upcoming elk hunt.        January 28, 2021 visit:  Did well with his last injection.  To provide extra relief.  Few days ago started having return of symptoms.  Medial sided.  Nonradiating.  No new injuries or traumas.  He like an injection     September 23, 2020 visit:  At his last visit he had a intra-articular steroid injection.  He had X relief with the injection.  He continues to do well although certainly there is some discomfort returning.  He will be leaving for an elk hunt coming up here shortly.  He would like repeat injection.     May 28, 2020 visit:  No new injuries.  Same pain is in the past.  Is requesting an injection.  December 18, 2019 visit:  Returns for medial sided knee pain.  Same pain is in the past.  Over the weekend he was working on his car.  No specific injuries.  However started having increasing pain.  Rates the pain is moderate to severe.  Causing him to limp.  Previous injections have been very helpful.  He is requesting an injection.  August 8, 2019 visit:  Returns for his left knee.  Pain along the medial side.  Previous injection has provided him relief.  He is been  taken Tylenol ibuprofen for other issues.  He reports for a few weeks it was extremely flared up and he called for an appointment.  By the time he is able to get in the pain has improved.  Continues to have some discomfort medially.  Also complains of stiffness.  April 17, 2019 visit:   Returns for left medial knee pain.  Different pain than in the past.  Rates it is moderate to severe at times.  Describes it as aching and sharp.  Nonradiating.  No new injuries.  On his last visit he received an intra-articular injection which provided a little less than 6 weeks relief.  He is been taken ibuprofen.  He presents with his wife.     January 2, 2018 visit:  Returns for continued left medial knee achiness.  He reports the sharp pain from the meniscus tear preoperatively is gone.  Now more of a gradual achy soreness.  It is never significantly improved since his last visit.  It has actually worsened some.  Ibuprofen has not been helpful.  He rates it as mild to moderate at times.  It does cause him to limp at times.     Patient's past medical, surgical, social and family histories reviewed.     Past Medical History:   Diagnosis Date    Chronic back pain     percocet, spinal fluid leak dx but never visible    Chronic headaches     Esophageal reflux     GERD (gastroesophageal reflux disease)     Motion sickness     CARY (obstructive sleep apnea) 03/2009    AHI 26 Ndir 84%    Primary osteoarthritis of left knee 04/17/2019    RLS (restless legs syndrome)        Past Surgical History:   Procedure Laterality Date    ARTHROSCOPY KNEE WITH MEDIAL MENISCECTOMY Left 6/8/2018    Procedure: ARTHROSCOPY KNEE WITH MEDIAL MENISCECTOMY;  Left knee arthroscopy with arthroscopic partial meniscectomy;  Surgeon: Dane Blanco DO;  Location: [facility]    COLONOSCOPY  06/12/07    COLONOSCOPY N/A 7/12/2017    Procedure: COLONOSCOPY;  colonoscopy;  Surgeon: Lake Mueller MD;  Location:  GI    HC TRABECULOPLASTY BY LASER SURGERY   2000    LASIK    SEPTOPLASTY, TURBINOPLASTY, COMBINED N/A 4/26/2016    Procedure: COMBINED SEPTOPLASTY, TURBINOPLASTY;  Surgeon: Ken Molina MD;  Location:  OR    [facility]         Medications:  Current Outpatient Medications   Medication Sig Dispense Refill    atorvastatin (LIPITOR) 40 MG tablet TAKE 1 TABLET DAILY 90 tablet 0    HORIZANT 600 MG tablet Take 600 mg by mouth At Bedtime      methadone (DOLOPHINE) 5 MG tablet 10 mg      omeprazole (PRILOSEC) 20 MG DR capsule TAKE 1 CAPSULE DAILY 90 capsule 0    SENNA-PLUS 8.6-50 MG tablet daily as needed      sertraline (ZOLOFT) 50 MG tablet TAKE 1 TABLET DAILY (NEEDS VIRTUAL RECHECK FOR MORE REFILLS) 90 tablet 0    azithromycin (ZITHROMAX) 250 MG tablet Two tablets first day, then one tablet daily for four days. (Patient not taking: Reported on 3/20/2024) 6 tablet 0    benzonatate (TESSALON) 200 MG capsule Take 1 capsule (200 mg) by mouth 3 times daily as needed for cough (Patient not taking: Reported on 3/20/2024) 30 capsule 0    Multiple Vitamin (ONE-A-DAY MENS PO) Take 1 tablet by mouth every evening        Current Facility-Administered Medications   Medication Dose Route Frequency Provider Last Rate Last Admin    lidocaine 1 % injection 5 mL  5 mL   Anish Garrett MD   5 mL at 03/19/24 0817    lidocaine 1 % injection 5 mL  5 mL   Anish Garrett MD   5 mL at 10/31/23 0944    lidocaine 1 % injection 5 mL  5 mL   Anish Garrett MD   5 mL at 02/28/23 0903    lidocaine 1 % injection 5 mL  5 mL   Anish Garrett MD   5 mL at 08/25/22 1326    methylPREDNISolone (DEPO-Medrol) injection 80 mg  80 mg   Anish Garrett MD   80 mg at 03/19/24 0817    methylPREDNISolone (DEPO-Medrol) injection 80 mg  80 mg   Anish Garrett MD   80 mg at 10/31/23 0944    methylPREDNISolone (DEPO-MEDROL) injection 80 mg  80 mg   Anish Garrett MD   80 mg at 02/28/23 0903    methylPREDNISolone (DEPO-MEDROL) injection 80 mg  80 mg    Anish Garrett MD   80 mg at 22 1326    methylPREDNISolone (DEPO-MEDROL) injection 80 mg  80 mg   Anish Garrett MD   80 mg at 22 0916       Allergies   Allergen Reactions    Triptans Nausea and Vomiting     Migraine medications       Social History     Occupational History    Occupation: Elevator service     Comment: [employer]   Tobacco Use    Smoking status: Never     Passive exposure: Never    Smokeless tobacco: Never    Tobacco comments:     no smokers in household   Vaping Use    Vaping status: Never Used   Substance and Sexual Activity    Alcohol use: No    Drug use: No    Sexual activity: Yes     Partners: Female     Birth control/protection: Female Surgical     Comment: Wife had a tubal, 5 children       Family History   Problem Relation Age of Onset    Cerebrovascular Disease Mother 66        aneursym -  at 66    Other - See Comments Sister         Half sister, heart problems unsure what kind    Other - See Comments Brother         Was in a fire    No Known Problems Maternal Grandmother     No Known Problems Maternal Grandfather     Arthritis Maternal Aunt     Arthritis Maternal Uncle     Muscular Disorder Daughter 18        Small fiber neuropathy    Unknown/Adopted Father     Unknown/Adopted Paternal Grandmother     Unknown/Adopted Paternal Grandfather     Hypertension No family hx of     Diabetes No family hx of        REVIEW OF SYSTEMS  10 point review systems performed otherwise negative as noted as per history of present illness.    Physical Exam:  Vitals: /70   Pulse 70   Temp 98.1  F (36.7  C) (Temporal)   Resp 16   Wt 79.7 kg (175 lb 9.6 oz)   BMI 25.93 kg/m    BMI= Body mass index is 25.93 kg/m .  Constitutional: healthy, alert and no acute distress   Psychiatric: mentation appears normal and affect normal/bright  NEURO: no focal deficits  RESP: Normal with easy respirations and no use of accessory muscles to breathe, no audible wheezing or  retractions  CV: No peripheral edema         Regular rate and rhythm by palpation  SKIN: No erythema, rashes, excoriation, or breakdown. No evidence of infection.   JOINT/EXTREMITIES:left knee: no effusion. AROM 3-105. Stiffness and tightness with motion. Varus deformity. No pain with motion. Tender along medial joint line. No instability with valgus or varus. Patella tracks midline. Calf soft non-tender.    GAIT: not tested     Diagnostic Modalities:  Left knee x-ray: No acute fractures or dislocations.  Bone-on-bone arthritis medial compartment.  Mild lateral compartment narrowing.  Mild patellofemoral lateral facet narrowing  Independent visualization of the images was performed.      Impression: left knee primary osteoarthritis    Plan:  All of the above pertinent physical exam and imaging modalities findings was reviewed with Benjamín.  Last injection failed to provide meaningful relief. Has tried extensive conservative therapy without lasting improvement and reports knee continues to worsen overall.     We lengthy discussion regarding his pain.  We discussed how to clinically correlated to his radiographs showing end-stage osteoarthritis.  Unfortunate now the pain is refractory to conservative care and it is impacting the quality his life.  His pain at rest.  Pain with activities.  Given his issues we discussed left total knee arthroplasty.  At this point given some thought and let us know.        Return to clinic PRN, or sooner as needed for changes.  Re-x-ray on return: No    Scribed by:  MYRTLE Osorio, CNP  8:14 AM  4/10/2024  The information in this document, created by a scribe for me, accurately reflects the services I personally performed and the decisions made by me. I have reviewed and approved this document for accuracy    Dane Blanco DO

## 2024-04-11 ENCOUNTER — VIRTUAL VISIT (OUTPATIENT)
Dept: FAMILY MEDICINE | Facility: CLINIC | Age: 64
End: 2024-04-11
Payer: COMMERCIAL

## 2024-04-11 DIAGNOSIS — F41.9 ANXIETY: ICD-10-CM

## 2024-04-11 DIAGNOSIS — F43.23 ADJUSTMENT DISORDER WITH MIXED ANXIETY AND DEPRESSED MOOD: ICD-10-CM

## 2024-04-11 PROCEDURE — 99213 OFFICE O/P EST LOW 20 MIN: CPT | Mod: 95 | Performed by: PHYSICIAN ASSISTANT

## 2024-04-11 NOTE — PROGRESS NOTES
"    Instructions Relayed to Patient by Virtual Roomer:     Patient is active on [employer]:   Relayed following to patient: \"It looks like you are active on [facility], are you able to join the visit this way? If not, do you need us to send you a link now or would you like your provider to send a link via text or email when they are ready to initiate the visit?\"    Reminded patient to ensure they were logged on to virtual visit by arrival time listed. Documented in appointment notes if patient had flexibility to initiate visit sooner than arrival time. If pediatric virtual visit, ensured pediatric patient along with parent/guardian will be present for video visit.     Patient offered the website [email or web address] and/or phone number to [employer] Help line: [phone]      Benjamín is a 64 year old who is being evaluated via a billable video visit.    How would you like to obtain your AVS? [employer]  If the video visit is dropped, the invitation should be resent by: Text to cell phone: [phone]  Will anyone else be joining your video visit? No      Assessment & Plan     Anxiety  Very well-controlled.  He is very happy with the medication no changes recommended  - sertraline (ZOLOFT) 50 MG tablet; Take 1 tablet (50 mg) by mouth daily  Recheck 6 months  Adjustment disorder with mixed anxiety and depressed mood  Very well-controlled continue current meds  - sertraline (ZOLOFT) 50 MG tablet; Take 1 tablet (50 mg) by mouth daily    See specialty for insomnia and restless leg    He is aware he will need a preop prior to his surgery    This chart documentation was completed in part with Dragon voice recognition software.  Documentation is reviewed after completion, however, some words and grammatical errors may remain.  Aleida Larson PA-C              Subjective   Benjamín is a 64 year old, presenting for the following health issues:  Recheck Medication (Mood Recheck)        4/11/2024     6:57 AM   Additional " Questions   Roomed by Tabatha MONTGOMERY   Accompanied by Self     History of Present Illness       Reason for visit:  Refill medsHe consumes 1 sweetened beverage(s) daily.He exercises with enough effort to increase his heart rate 20 to 29 minutes per day.  He exercises with enough effort to increase his heart rate 3 or less days per week.   He is taking medications regularly.       Depression and Anxiety   How are you doing with your depression since your last visit? Improved   How are you doing with your anxiety since your last visit?  Improved   Are you having other symptoms that might be associated with depression or anxiety? No  Have you had a significant life event? OTHER: Found out pt needs a knee replacement - however this has not affected mood per pt     Do you have any concerns with your use of alcohol or other drugs? No    Social History     Tobacco Use    Smoking status: Never     Passive exposure: Never    Smokeless tobacco: Never    Tobacco comments:     no smokers in household   Vaping Use    Vaping status: Never Used   Substance Use Topics    Alcohol use: No    Drug use: No         1/2/2023     9:25 AM 10/5/2023     8:47 AM 2/18/2024    10:53 AM   PHQ   PHQ-9 Total Score 15 9 13   Q9: Thoughts of better off dead/self-harm past 2 weeks Not at all Not at all Not at all         1/2/2023     9:26 AM 6/11/2023     7:35 PM 2/19/2024     8:34 AM   GENARO-7 SCORE   Total Score 14 (moderate anxiety) 10 (moderate anxiety) 3 (minimal anxiety)   Total Score 14 10 3         4/11/2024     7:25 AM   Last PHQ-9   1.  Little interest or pleasure in doing things 0   2.  Feeling down, depressed, or hopeless 0   3.  Trouble falling or staying asleep, or sleeping too much 3   4.  Feeling tired or having little energy 3   5.  Poor appetite or overeating 0   6.  Feeling bad about yourself 0   7.  Trouble concentrating 2   8.  Moving slowly or restless 3   Difficulty at work, home, or with people Not difficult at all         2/19/2024      8:34 AM   GENARO-7    1. Feeling nervous, anxious, or on edge 0   2. Not being able to stop or control worrying 0   3. Worrying too much about different things 0   4. Trouble relaxing 1   5. Being so restless that it is hard to sit still 1   6. Becoming easily annoyed or irritable 1   7. Feeling afraid, as if something awful might happen 0   GENARO-7 Total Score 3   If you checked any problems, how difficult have they made it for you to do your work, take care of things at home, or get along with other people? Not difficult at all             Review of Systems  Constitutional, HEENT, cardiovascular, pulmonary, gi and gu systems are negative, except as otherwise noted.      Objective             Physical Exam   GENERAL: alert and no distress  EYES: Eyes grossly normal to inspection.  No discharge or erythema, or obvious scleral/conjunctival abnormalities.  RESP: No audible wheeze, cough, or visible cyanosis.    SKIN: Visible skin clear. No significant rash, abnormal pigmentation or lesions.  NEURO: Cranial nerves grossly intact.  Mentation and speech appropriate for age.  PSYCH: Appropriate affect, tone, and pace of words          Video-Visit Details    Type of service:  Video Visit   Originating Location (pt. Location): Home    Distant Location (provider location):  Off-site  Platform used for Video Visit: Carlos  Signed Electronically by: Aleida Larson PA-C

## 2024-05-22 ENCOUNTER — VIRTUAL VISIT (OUTPATIENT)
Dept: UROLOGY | Facility: CLINIC | Age: 64
End: 2024-05-22
Attending: PHYSICIAN ASSISTANT
Payer: COMMERCIAL

## 2024-05-22 DIAGNOSIS — N48.6 PEYRONIE DISEASE: ICD-10-CM

## 2024-05-22 PROCEDURE — 99203 OFFICE O/P NEW LOW 30 MIN: CPT | Mod: 95 | Performed by: UROLOGY

## 2024-05-22 ASSESSMENT — PAIN SCALES - GENERAL: PAINLEVEL: NO PAIN (0)

## 2024-05-22 NOTE — NURSING NOTE
Is the patient currently in the state of [place]? YES    Visit mode:VIDEO    If the visit is dropped, the patient can be reconnected by: VIDEO VISIT: Text to cell phone:   Telephone Information:   Mobile [phone]       Will anyone else be joining the visit? NO  (If patient encounters technical issues they should call [phone])    How would you like to obtain your AVS? MyChart    Are changes needed to the allergy or medication list? No    Are refills needed on medications prescribed by this physician? NO    Reason for visit: Consult ([facility] - Per patient. Peyronie disease)    Aleida MUJICAF

## 2024-05-22 NOTE — LETTER
5/22/2024       RE: Benjamín Mendoza  [address] [facility] [address]     Dear Colleague,    Thank you for referring your patient, Benjamín Mendoza, to the [facility]. Please see a copy of my visit note below.    Virtual Visit Details    Type of service:  Video Visit     Originating Location (pt. Location): Home    Distant Location (provider location):  On-site  Platform used for Video Visit: Salsify      I am seeing Benjamín Mendoza in consultation for evaluation of Peyronie's disease.  Referring: Aleida Larson     HPI:  Benjamín Mendoza is a 64 year old male with history of Peyronie's disease.    New dorsal curvature since Jan 2024.    Onset: 4-5 months ago.  Inciting trauma:  none that he recalls.  Severity/Degree of curve- 30-40 degrees.  He feels this stable since Jan 2024 4+ months.  ED: no  Dupuytren's contractures: No    Pain with erection: no, but pain with intercourse.  Degree of curve precludes intercourse:  not comfortable.    PAST MEDICAL HX:  Past Medical History:   Diagnosis Date     Chronic back pain     percocet, spinal fluid leak dx but never visible     Chronic headaches      Esophageal reflux      GERD (gastroesophageal reflux disease)      Motion sickness      CARY (obstructive sleep apnea) 03/2009    AHI 26 Ndir 84%     Primary osteoarthritis of left knee 04/17/2019     RLS (restless legs syndrome)    Left TKA pending June 2024.  Severe restless legs- on methadone for that.    PAST SURG HX:  Past Surgical History:   Procedure Laterality Date     ARTHROSCOPY KNEE WITH MEDIAL MENISCECTOMY Left 6/8/2018    Procedure: ARTHROSCOPY KNEE WITH MEDIAL MENISCECTOMY;  Left knee arthroscopy with arthroscopic partial meniscectomy;  Surgeon: Dane Blanco DO;  Location: [facility]     COLONOSCOPY  06/12/07     COLONOSCOPY N/A 7/12/2017    Procedure: COLONOSCOPY;  colonoscopy;  Surgeon: Lake Mueller MD;  Location:   GI     HC TRABECULOPLASTY BY LASER SURGERY      LASIK     SEPTOPLASTY, TURBINOPLASTY, COMBINED N/A 2016    Procedure: COMBINED SEPTOPLASTY, TURBINOPLASTY;  Surgeon: Ken Molina MD;  Location: [facility]          FAMILY HX:  Family History   Problem Relation Age of Onset     Cerebrovascular Disease Mother 66        aneursym -  at 66     Other - See Comments Sister         Half sister, heart problems unsure what kind     Other - See Comments Brother         Was in a fire     No Known Problems Maternal Grandmother      No Known Problems Maternal Grandfather      Arthritis Maternal Aunt      Arthritis Maternal Uncle      Muscular Disorder Daughter 18        Small fiber neuropathy     Unknown/Adopted Father      Unknown/Adopted Paternal Grandmother      Unknown/Adopted Paternal Grandfather      Hypertension No family hx of      Diabetes No family hx of        SOCIAL HX:  Social History     Tobacco Use     Smoking status: Never     Passive exposure: Never     Smokeless tobacco: Never     Tobacco comments:     no smokers in household   Vaping Use     Vaping status: Never Used   Substance Use Topics     Alcohol use: No     Drug use: No       MEDICATIONS:  Current Outpatient Medications   Medication Sig Dispense Refill     atorvastatin (LIPITOR) 40 MG tablet TAKE 1 TABLET DAILY 90 tablet 0     HORIZANT 600 MG tablet Take 600 mg by mouth At Bedtime (Patient not taking: Reported on 2024)       methadone (DOLOPHINE) 5 MG tablet 10 mg       Multiple Vitamin (ONE-A-DAY MENS PO) Take 1 tablet by mouth every evening       omeprazole (PRILOSEC) 20 MG DR capsule TAKE 1 CAPSULE DAILY 90 capsule 0     SENNA-PLUS 8.6-50 MG tablet daily as needed       sertraline (ZOLOFT) 50 MG tablet Take 1 tablet (50 mg) by mouth daily 90 tablet 1     Current Facility-Administered Medications   Medication Dose Route Frequency Provider Last Rate Last Admin     lidocaine 1 % injection 5 mL  5 mL   Gerry  Anish Butts MD   5 mL at 03/19/24 0817     lidocaine 1 % injection 5 mL  5 mL   Anish Garrett MD   5 mL at 10/31/23 0944     lidocaine 1 % injection 5 mL  5 mL   Anish Garrett MD   5 mL at 02/28/23 0903     lidocaine 1 % injection 5 mL  5 mL   Anish Garrett MD   5 mL at 08/25/22 1326     methylPREDNISolone (DEPO-Medrol) injection 80 mg  80 mg   Anish Garrett MD   80 mg at 03/19/24 0817     methylPREDNISolone (DEPO-Medrol) injection 80 mg  80 mg   Anish Garrett MD   80 mg at 10/31/23 0944     methylPREDNISolone (DEPO-MEDROL) injection 80 mg  80 mg   Anish Garrett MD   80 mg at 02/28/23 0903     methylPREDNISolone (DEPO-MEDROL) injection 80 mg  80 mg   Anish Garrett MD   80 mg at 08/25/22 1326     methylPREDNISolone (DEPO-MEDROL) injection 80 mg  80 mg   Ansih Garrett MD   80 mg at 04/13/22 0916       ALLERGIES:  Triptans      GENERAL PHYSICAL EXAM:   Exam  General- Alert, oriented, nad.  Pleasant and conversant.  Eyes- anicteric, EOMI.  Resps- normal, non-labored.  No cough   exam- deferred.   Neurological - no tremors  Skin - no discoloration/ lesions noted  Psychiatric - no anxiety, alert & oriented.      The rest of a comprehensive physical examination is deferred due to video visit     Imaging/labs:  Lab Results   Component Value Date    PSA 0.64 06/20/2023    PSA 0.64 10/14/2015        ASSESSMENT:   Peyronie's disease with curvature, some penile pain.    PLAN:  Peyronie's disease.  - Recommended conservative management, observation, take care not to injure penis, avoid intercourse with a less than firm erection, NSAIDS.  - discussed mechanical therapies of Xiaflex injections  +/-  RestoreX traction device.  - discussed surgical therapy of penile plication.    He will think about options and let us know if any questions about treatment options in the future.      Copied cc to Consulting provider Aleida Larson        Thank-you,  Pradeep Wayne MD      Urological Surgeon         Additional Coding Information:    Problems:  3 -- one stable chronic illness    Data Reviewed  N/A     Level of risk:  4 -- prescription drug management (discussed Xiaflex )    Time spent:  17 minutes spent on the date of the encounter doing chart review, history and exam, documentation and further activities per the note. Video visit time included.           Again, thank you for allowing me to participate in the care of your patient.      Sincerely,    Pradeep Wayne MD

## 2024-05-24 ENCOUNTER — OFFICE VISIT (OUTPATIENT)
Dept: FAMILY MEDICINE | Facility: CLINIC | Age: 64
End: 2024-05-24
Payer: COMMERCIAL

## 2024-05-24 VITALS
SYSTOLIC BLOOD PRESSURE: 122 MMHG | BODY MASS INDEX: 26.89 KG/M2 | HEIGHT: 69 IN | DIASTOLIC BLOOD PRESSURE: 80 MMHG | WEIGHT: 181.56 LBS | HEART RATE: 60 BPM | RESPIRATION RATE: 17 BRPM | OXYGEN SATURATION: 99 % | TEMPERATURE: 98.5 F

## 2024-05-24 DIAGNOSIS — J01.01 ACUTE RECURRENT MAXILLARY SINUSITIS: Primary | ICD-10-CM

## 2024-05-24 PROCEDURE — 99213 OFFICE O/P EST LOW 20 MIN: CPT | Performed by: FAMILY MEDICINE

## 2024-05-24 RX ORDER — PSEUDOEPHEDRINE HCL 30 MG
30 TABLET ORAL EVERY 4 HOURS PRN
Qty: 30 TABLET | Refills: 0 | Status: SHIPPED | OUTPATIENT
Start: 2024-05-24 | End: 2024-06-03

## 2024-05-24 RX ORDER — METHYLPREDNISOLONE 4 MG
TABLET, DOSE PACK ORAL
Qty: 21 TABLET | Refills: 0 | Status: SHIPPED | OUTPATIENT
Start: 2024-05-24 | End: 2024-06-03

## 2024-05-24 ASSESSMENT — PAIN SCALES - GENERAL: PAINLEVEL: NO PAIN (0)

## 2024-05-24 NOTE — PROGRESS NOTES
"  Assessment & Plan     Acute recurrent maxillary sinusitis  Symptomatic therapy suggested: push fluids, rest, gargle warm salt water, use vaporizer or mist needed , and use acetaminophen, ibuprofen as needed.   - amoxicillin-clavulanate (AUGMENTIN) 875-125 MG tablet; Take 1 tablet by mouth 2 times daily for 7 days  - methylPREDNISolone (MEDROL DOSEPAK) 4 MG tablet therapy pack; Follow Package Directions  - pseudoePHEDrine (SUDAFED) 30 MG tablet; Take 1 tablet (30 mg) by mouth every 4 hours as needed for congestion            Subjective   Benjamín is a 64 year old, presenting for the following health issues:  Nasal Congestion      5/24/2024     1:04 PM   Additional Questions   Roomed by VE           Acute Illness  Acute illness concerns: sinus congestion  Onset/Duration: couple months  Symptoms:  Fever: No  Chills/Sweats: No  Headache (location?): No  Sinus Pressure: YES  Conjunctivitis:  No  Ear Pain: no  Rhinorrhea: YES- in the morning  Congestion: YES  Sore Throat: YES- in the morning probably from drainage  Cough: no  Wheeze: No  Decreased Appetite: No  Nausea: No  Vomiting: No  Diarrhea: No  Dysuria/Freq.: No  Dysuria or Hematuria: No  Fatigue/Achiness: YES- fatigue  Sick/Strep Exposure: No  Therapies tried and outcome: z-jacquelyn , doxycycline          Review of Systems  Constitutional, HEENT, cardiovascular, pulmonary, GI, , musculoskeletal, neuro, skin, endocrine and psych systems are negative, except as otherwise noted.      Objective    /80 (BP Location: Left arm, Patient Position: Chair, Cuff Size: Adult Regular)   Pulse 60   Temp 98.5  F (36.9  C) (Temporal)   Resp 17   Ht 1.753 m (5' 9\")   Wt 82.4 kg (181 lb 9 oz)   SpO2 99%   BMI 26.81 kg/m    Body mass index is 26.81 kg/m .  Physical Exam   GENERAL: alert and no distress  EYES: Eyes grossly normal to inspection, PERRL and conjunctivae and sclerae normal  HENT: ear canals and TM's normal, nose and mouth without ulcers or lesions  NECK: no " adenopathy, no asymmetry, masses, or scars  RESP: lungs clear to auscultation - no rales, rhonchi or wheezes  CV: regular rate and rhythm, normal S1 S2, no S3 or S4, no murmur, click or rub, no peripheral edema  ABDOMEN: soft, nontender, no hepatosplenomegaly, no masses and bowel sounds normal  MS: no gross musculoskeletal defects noted, no edema          Signed Electronically by: Abdoulaye Reynolds MD    Answers submitted by the patient for this visit:  General Questionnaire (Submitted on 5/23/2024)  Chief Complaint: Chronic problems general questions HPI Form  What is the reason for your visit today? : I'm still congested and coughing up green and yellow flem.  How many servings of fruits and vegetables do you eat daily?: 2-3  On average, how many sweetened beverages do you drink each day (Examples: soda, juice, sweet tea, etc.  Do NOT count diet or artificially sweetened beverages)?: 1  How many minutes a day do you exercise enough to make your heart beat faster?: 20 to 29  How many days a week do you exercise enough to make your heart beat faster?: 4  How many days per week do you miss taking your medication?: 0

## 2024-06-03 ENCOUNTER — OFFICE VISIT (OUTPATIENT)
Dept: FAMILY MEDICINE | Facility: CLINIC | Age: 64
End: 2024-06-03
Payer: COMMERCIAL

## 2024-06-03 VITALS
SYSTOLIC BLOOD PRESSURE: 126 MMHG | RESPIRATION RATE: 16 BRPM | WEIGHT: 177.3 LBS | OXYGEN SATURATION: 99 % | HEART RATE: 70 BPM | BODY MASS INDEX: 26.26 KG/M2 | HEIGHT: 69 IN | TEMPERATURE: 97.5 F | DIASTOLIC BLOOD PRESSURE: 64 MMHG

## 2024-06-03 DIAGNOSIS — K21.9 GASTROESOPHAGEAL REFLUX DISEASE WITHOUT ESOPHAGITIS: ICD-10-CM

## 2024-06-03 DIAGNOSIS — G47.00 INSOMNIA, UNSPECIFIED TYPE: ICD-10-CM

## 2024-06-03 DIAGNOSIS — G47.33 OSA (OBSTRUCTIVE SLEEP APNEA): ICD-10-CM

## 2024-06-03 DIAGNOSIS — F43.23 ADJUSTMENT DISORDER WITH MIXED ANXIETY AND DEPRESSED MOOD: ICD-10-CM

## 2024-06-03 DIAGNOSIS — M17.12 PRIMARY OSTEOARTHRITIS OF LEFT KNEE: ICD-10-CM

## 2024-06-03 DIAGNOSIS — E78.5 HYPERLIPIDEMIA, UNSPECIFIED HYPERLIPIDEMIA TYPE: ICD-10-CM

## 2024-06-03 DIAGNOSIS — Z01.818 PREOP GENERAL PHYSICAL EXAM: Primary | ICD-10-CM

## 2024-06-03 DIAGNOSIS — G25.81 RESTLESS LEGS SYNDROME (RLS): ICD-10-CM

## 2024-06-03 LAB
ANION GAP SERPL CALCULATED.3IONS-SCNC: 9 MMOL/L (ref 7–15)
BUN SERPL-MCNC: 15.2 MG/DL (ref 8–23)
CALCIUM SERPL-MCNC: 9.2 MG/DL (ref 8.8–10.2)
CHLORIDE SERPL-SCNC: 104 MMOL/L (ref 98–107)
CREAT SERPL-MCNC: 0.82 MG/DL (ref 0.67–1.17)
DEPRECATED HCO3 PLAS-SCNC: 26 MMOL/L (ref 22–29)
EGFRCR SERPLBLD CKD-EPI 2021: >90 ML/MIN/1.73M2
ERYTHROCYTE [DISTWIDTH] IN BLOOD BY AUTOMATED COUNT: 12.7 % (ref 10–15)
GLUCOSE SERPL-MCNC: 102 MG/DL (ref 70–99)
HCT VFR BLD AUTO: 43.3 % (ref 40–53)
HGB BLD-MCNC: 14.3 G/DL (ref 13.3–17.7)
MCH RBC QN AUTO: 29.6 PG (ref 26.5–33)
MCHC RBC AUTO-ENTMCNC: 33 G/DL (ref 31.5–36.5)
MCV RBC AUTO: 90 FL (ref 78–100)
PLATELET # BLD AUTO: 167 10E3/UL (ref 150–450)
POTASSIUM SERPL-SCNC: 4.3 MMOL/L (ref 3.4–5.3)
RBC # BLD AUTO: 4.83 10E6/UL (ref 4.4–5.9)
SODIUM SERPL-SCNC: 139 MMOL/L (ref 135–145)
WBC # BLD AUTO: 6.6 10E3/UL (ref 4–11)

## 2024-06-03 PROCEDURE — 99214 OFFICE O/P EST MOD 30 MIN: CPT | Performed by: PHYSICIAN ASSISTANT

## 2024-06-03 PROCEDURE — 36415 COLL VENOUS BLD VENIPUNCTURE: CPT | Performed by: PHYSICIAN ASSISTANT

## 2024-06-03 PROCEDURE — 85027 COMPLETE CBC AUTOMATED: CPT | Performed by: PHYSICIAN ASSISTANT

## 2024-06-03 PROCEDURE — 80048 BASIC METABOLIC PNL TOTAL CA: CPT | Performed by: PHYSICIAN ASSISTANT

## 2024-06-03 ASSESSMENT — PAIN SCALES - GENERAL: PAINLEVEL: NO PAIN (0)

## 2024-06-03 NOTE — PROGRESS NOTES
Preoperative Evaluation  [facility] [place]  [address], [address]  [place] [address]  Phone: [phone]  Fax: [phone]  Primary Provider: Aleida Larson PA-C  Pre-op Performing Provider: Aleida Larson PA-C  Alfonso 3, 2024             6/2/2024   Surgical Information   What procedure is being done? Left knee replacement   Facility or Hospital where procedure/surgery will be performed: [facility]   Who is doing the procedure / surgery? Jermaine Rodríguez   Date of surgery / procedure: 6-26-24   Time of surgery / procedure: ?   Where do you plan to recover after surgery? at home with family     Fax number for surgical facility: [phone]    Assessment & Plan     The proposed surgical procedure is considered INTERMEDIATE risk.    Preop general physical exam  Patient is optimized for procedure as above  - Basic metabolic panel  (Ca, Cl, CO2, Creat, Gluc, K, Na, BUN); Future  - CBC with platelets; Future  - Basic metabolic panel  (Ca, Cl, CO2, Creat, Gluc, K, Na, BUN)  - CBC with platelets    Primary osteoarthritis of left knee  He is scheduled for procedure as above    Hyperlipidemia, unspecified hyperlipidemia type  Well-controlled on current dosage of atorvastatin.    Adjustment disorder with mixed anxiety and depressed mood  Stable on sertraline,    Restless legs syndrome (RLS)  CARY (obstructive sleep apnea)  Insomnia, unspecified type  He is not treated with CPAP machine, it has been ineffective, he is following with sleep medicine, Dr. Villalobos    Gastroesophageal reflux disease without esophagitis  Stable and well-controlled on omeprazole         Risks and Recommendations  The patient has the following additional risks and recommendations for perioperative complications:  Social and Substance:    - He is using methadone to treat restless leg syndrome under the direction of Dr. Villalobos    Also please note his previous response after his discharge from his last knee   procedure.      Preoperative Medication Instructions  Antiplatelet or Anticoagulation Medication Instructions   - Patient is on no antiplatelet or anticoagulation medications.    Additional Medication Instructions  Take all scheduled medications on the day of surgery EXCEPT for modifications listed below:   - ibuprofen (Advil, Motrin): DO NOT TAKE 1 day before surgery.     His surgeon recommends stopping ibuprofen 3 days before so that is what I instructed him to do as well  Recommendation  Approval given to proceed with proposed procedure, without further diagnostic evaluation.        Amirah Butts is a 64 year old, presenting for the following:  Pre-Op Exam          6/3/2024     8:15 AM   Additional Questions   Roomed by Bina KELLY   Accompanied by Venancio         6/3/2024     8:15 AM   Patient Reported Additional Medications   Patient reports taking the following new medications NA     HPI related to upcoming procedure: Patient has a history of left knee arthritis.  Conservative treatment has failed now including steroid injections.  For this reason he is scheduled for surgery        6/2/2024   Pre-Op Questionnaire   Have you ever had a heart attack or stroke? No   Have you ever had surgery on your heart or blood vessels, such as a stent placement, a coronary artery bypass, or surgery on an artery in your head, neck, heart, or legs? No   Do you have chest pain with activity? No   Do you have a history of heart failure? No   Do you currently have a cold, bronchitis or symptoms of other infection? (!) UNKNOWN he has recently been treated for a prolonged sinus infection.  He was treated with Medrol and Augmentin.  He has completed both prescriptions and still has some nasal congestion but his nasal mucus is clear and symptoms are better overall.  He has no cough, fevers or chills.   Do you have a cough, shortness of breath, or wheezing? No   Do you or anyone in your family have previous history of blood clots? No   Do  you or does anyone in your family have a serious bleeding problem such as prolonged bleeding following surgeries or cuts? No   Have you ever had problems with anemia or been told to take iron pills? No   Have you had any abnormal blood loss such as black, tarry or bloody stools? No   Have you ever had a blood transfusion? No   Are you willing to have a blood transfusion if it is medically needed before, during, or after your surgery? Yes   Have you or any of your relatives ever had problems with anesthesia? (!) YES - with his last knee procedure once he got home, he felt nauseated, shaky, and he felt like he was hyperventilating these symptoms all passed in a few hours though it was pretty uncomfortable for patient   Do you have sleep apnea, excessive snoring or daytime drowsiness? (!) YES- history of insomnia sees sleep medicine he does have sleep apnea but he is not using his CPAP at this time has RLS treated with  gabapentin and methadone- neither meds help with insomnia   Do you have a CPAP machine? (!) NO he has not found c pap to be effective, has had nasal septum repair with some success intially though that is no longer helping with his sleep apnea he does follow with sleep medicine   Do you have any artifical heart valves or other implanted medical devices like a pacemaker, defibrillator, or continuous glucose monitor? No   Do you have artificial joints? No   Are you allergic to latex? No     Health Care Directive  Patient does not have a Health Care Directive or Living Will: Discussed advance care planning with patient; however, patient declined at this time.    Preoperative Review of    reviewed - controlled substances prescribed by other outside provider(s).      Status of Chronic Conditions:  See problem list for active medical problems.  Problems all longstanding and stable, except as noted/documented.  See ROS for pertinent symptoms related to these conditions.    HYPERLIPIDEMIA - Patient has a  long history of significant Hyperlipidemia requiring medication for treatment with recent good control. Patient reports no problems or side effects with the medication.     Patient Active Problem List    Diagnosis Date Noted    Anxiety 01/02/2023     Priority: Medium    Adjustment disorder with mixed anxiety and depressed mood 01/02/2023     Priority: Medium    Primary osteoarthritis of left knee 04/17/2019     Priority: Medium    Gastroesophageal reflux disease without esophagitis 05/29/2018     Priority: Medium    Chondromalacia, knee, left 02/12/2018     Priority: Medium    Other tear of medial meniscus of left knee as current injury, initial encounter 02/12/2018     Priority: Medium    Advanced directives, counseling/discussion 04/03/2017     Priority: Medium     Gave pt information on 04/03/17.  Reina Villatoro, [facility]        Insomnia, unspecified type 08/10/2016     Priority: Medium    Chronic midline low back pain without sciatica 08/10/2016     Priority: Medium     Patient is followed by ERIBERTO KONG for ongoing prescription of pain medication.  All refills should be approved by this provider, or covering partner.    Medication(s): Oxycodone 5-3 25.   Maximum quantity per month: 90 for 3 months  Clinic visit frequency required: Q 6  months     Controlled substance agreement:  Encounter-Level CSA:     There are no encounter-level csa.      letter updated August 10, 2016   Pain Clinic evaluation in the past: No    DIRE Total Score(s):  No flowsheet data found.    Last [facility] website verification:  done on august 10,2016   [email or web address]      Chronic pain syndrome 08/10/2016     Priority: Medium    Tinnitus of both ears 01/20/2015     Priority: Medium    CARY (obstructive sleep apnea) 02/03/2010     Priority: Medium     PDS 3/2009 AHI 26 Usha 84%       Headache 03/26/2007     Priority: Medium     Patient is followed by NIRAV MONAE for ongoing prescription of  narcotic pain medicine.  Med: percocet.   Maximum use per month: 15-30  Expected duration: ongoing  Narcotic agreement on file: YES  Clinic visit recommended: Q 6  Months    Patient is followed by ERIBERTO KONG for ongoing prescription of narcotic pain medicine.  Med: Percocet.   Maximum use per month:  15-30  Expected duration: Chronic  Narcotic agreement on file: YES  Clinic visit recommended: Q 6  Months  January 31, 2013     Problem list name updated by automated process. Provider to review        Past Medical History:   Diagnosis Date    Chronic back pain     percocet, spinal fluid leak dx but never visible    Chronic headaches     Esophageal reflux     GERD (gastroesophageal reflux disease)     Motion sickness     CARY (obstructive sleep apnea) 03/2009    AHI 26 Ndir 84%    Primary osteoarthritis of left knee 04/17/2019    RLS (restless legs syndrome)      Past Surgical History:   Procedure Laterality Date    ARTHROSCOPY KNEE WITH MEDIAL MENISCECTOMY Left 6/8/2018    Procedure: ARTHROSCOPY KNEE WITH MEDIAL MENISCECTOMY;  Left knee arthroscopy with arthroscopic partial meniscectomy;  Surgeon: Dane Blanco DO;  Location: [facility] OR    COLONOSCOPY  06/12/07    COLONOSCOPY N/A 7/12/2017    Procedure: COLONOSCOPY;  colonoscopy;  Surgeon: Lake Mueller MD;  Location:  GI    HC TRABECULOPLASTY BY LASER SURGERY  2000    LASIK    SEPTOPLASTY, TURBINOPLASTY, COMBINED N/A 4/26/2016    Procedure: COMBINED SEPTOPLASTY, TURBINOPLASTY;  Surgeon: Ken Molina MD;  Location:  OR    [facility]       Current Outpatient Medications   Medication Sig Dispense Refill    atorvastatin (LIPITOR) 40 MG tablet TAKE 1 TABLET DAILY 90 tablet 0    HORIZANT 600 MG tablet Take 600 mg by mouth at bedtime      methadone (DOLOPHINE) 5 MG tablet 10 mg      Multiple Vitamin (ONE-A-DAY MENS PO) Take 1 tablet by mouth every evening      omeprazole (PRILOSEC) 20 MG DR capsule TAKE 1 CAPSULE DAILY 90 capsule 0     "pseudoePHEDrine (SUDAFED) 30 MG tablet Take 1 tablet (30 mg) by mouth every 4 hours as needed for congestion 30 tablet 0    SENNA-PLUS 8.6-50 MG tablet daily as needed      sertraline (ZOLOFT) 50 MG tablet Take 1 tablet (50 mg) by mouth daily 90 tablet 1       Allergies   Allergen Reactions    Triptans Nausea and Vomiting     Migraine medications        Social History     Tobacco Use    Smoking status: Never     Passive exposure: Never    Smokeless tobacco: Never    Tobacco comments:     no smokers in household   Substance Use Topics    Alcohol use: No       History   Drug Use No             Review of Systems  CONSTITUTIONAL: NEGATIVE for fever, chills, change in weight  INTEGUMENTARY/SKIN: NEGATIVE for worrisome rashes, moles or lesions  EYES: NEGATIVE for vision changes or irritation  ENT/MOUTH: resolving sinusitis  RESP: NEGATIVE for significant cough or SOB  CV: NEGATIVE for chest pain, palpitations or peripheral edema  GI: NEGATIVE for nausea, abdominal pain, heartburn, or change in bowel habits  : NEGATIVE for frequency, dysuria, or hematuria  MUSCULOSKELETAL:left knee pain  NEURO: NEGATIVE for weakness, dizziness or paresthesias  ENDOCRINE: NEGATIVE for temperature intolerance, skin/hair changes  HEME: NEGATIVE for bleeding problems  PSYCHIATRIC: NEGATIVE for changes in mood or affect    Objective    /64   Pulse 70   Temp 97.5  F (36.4  C) (Temporal)   Resp 16   Ht 1.753 m (5' 9.02\")   Wt 80.4 kg (177 lb 4.8 oz)   SpO2 99%   BMI 26.17 kg/m     Estimated body mass index is 26.17 kg/m  as calculated from the following:    Height as of this encounter: 1.753 m (5' 9.02\").    Weight as of this encounter: 80.4 kg (177 lb 4.8 oz).  Physical Exam  GENERAL: alert and no distress  EYES: Eyes grossly normal to inspection, PERRL and conjunctivae and sclerae normal  HENT: ear canals and TM's normal, nose and mouth without ulcers or lesions  NECK: no adenopathy, no asymmetry, masses, or scars  RESP: lungs " clear to auscultation - no rales, rhonchi or wheezes  CV: regular rate and rhythm, normal S1 S2, no S3 or S4, no murmur, click or rub, no peripheral edema  ABDOMEN: soft, nontender, no hepatosplenomegaly, no masses and bowel sounds normal  MS: no gross musculoskeletal defects noted, no edema  SKIN: no suspicious lesions or rashes  NEURO: Normal strength and tone, mentation intact and speech normal  PSYCH: mentation appears normal, affect normal/bright    Recent Labs   Lab Test 06/20/23  0806   HGB 14.7         POTASSIUM 4.3   CR 0.98        Diagnostics  Labs pending at this time.  Results will be reviewed when available.  Recent Results (from the past 24 hour(s))   Basic metabolic panel  (Ca, Cl, CO2, Creat, Gluc, K, Na, BUN)    Collection Time: 06/03/24  9:05 AM   Result Value Ref Range    Sodium 139 135 - 145 mmol/L    Potassium 4.3 3.4 - 5.3 mmol/L    Chloride 104 98 - 107 mmol/L    Carbon Dioxide (CO2) 26 22 - 29 mmol/L    Anion Gap 9 7 - 15 mmol/L    Urea Nitrogen 15.2 8.0 - 23.0 mg/dL    Creatinine 0.82 0.67 - 1.17 mg/dL    GFR Estimate >90 >60 mL/min/1.73m2    Calcium 9.2 8.8 - 10.2 mg/dL    Glucose 102 (H) 70 - 99 mg/dL   CBC with platelets    Collection Time: 06/03/24  9:05 AM   Result Value Ref Range    WBC Count 6.6 4.0 - 11.0 10e3/uL    RBC Count 4.83 4.40 - 5.90 10e6/uL    Hemoglobin 14.3 13.3 - 17.7 g/dL    Hematocrit 43.3 40.0 - 53.0 %    MCV 90 78 - 100 fL    MCH 29.6 26.5 - 33.0 pg    MCHC 33.0 31.5 - 36.5 g/dL    RDW 12.7 10.0 - 15.0 %    Platelet Count 167 150 - 450 10e3/uL        UA completed 6/2/2024 is normal.    No EKG required, no history of coronary heart disease, significant arrhythmia, peripheral arterial disease or other structural heart disease.    Revised Cardiac Risk Index (RCRI)  The patient has the following serious cardiovascular risks for perioperative complications:   - No serious cardiac risks = 0 points     RCRI Interpretation: 0 points: Class I (very low risk -  0.4% complication rate)         Signed Electronically by: Aleida Larson PA-C  Copy of this evaluation report is provided to requesting physician.

## 2024-06-03 NOTE — PATIENT INSTRUCTIONS
How to Take Your Medication Before Surgery  Preoperative Medication Instructions        Take all scheduled medications on the day of surgery EXCEPT for modifications listed below:   - ibuprofen (Advil, Motrin): DO NOT TAKE 1 day before surgery.   Patient Education   Preparing for Your Surgery  Getting started  A nurse will call you to review your health history and instructions. They will give you an arrival time based on your scheduled surgery time. Please be ready to share:  Your doctor's clinic name and phone number  Your medical, surgical, and anesthesia history  A list of allergies and sensitivities  A list of medicines, including herbal treatments and over-the-counter drugs  Whether the patient has a legal guardian (ask how to send us the papers in advance)  Please tell us if you're pregnant--or if there's any chance you might be pregnant. Some surgeries may injure a fetus (unborn baby), so they require a pregnancy test. Surgeries that are safe for a fetus don't always need a test, and you can choose whether to have one.   If you have a child who's having surgery, please ask for a copy of Preparing for Your Child's Surgery.    Preparing for surgery  Within 10 to 30 days of surgery: Have a pre-op exam (sometimes called an H&P, or History and Physical). This can be done at a clinic or pre-operative center.  If you're having a , you may not need this exam. Talk to your care team.  At your pre-op exam, talk to your care team about all medicines you take. If you need to stop any medicines before surgery, ask when to start taking them again.  We do this for your safety. Many medicines can make you bleed too much during surgery. Some change how well surgery (anesthesia) drugs work.  Call your insurance company to let them know you're having surgery. (If you don't have insurance, call [phone].)  Call your clinic if there's any change in your health. This includes signs of a cold or flu (sore throat,  runny nose, cough, rash, fever). It also includes a scrape or scratch near the surgery site.  If you have questions on the day of surgery, call your hospital or surgery center.  Eating and drinking guidelines  For your safety: Unless your surgeon tells you otherwise, follow the guidelines below.  Eat and drink as usual until 8 hours before you arrive for surgery. After that, no food or milk.  Drink clear liquids until 2 hours before you arrive. These are liquids you can see through, like water, Gatorade, and Propel Water. They also include plain black coffee and tea (no cream or milk), candy, and breath mints. You can spit out gum when you arrive.  If you drink alcohol: Stop drinking it the night before surgery.  If your care team tells you to take medicine on the morning of surgery, it's okay to take it with a sip of water.  Preventing infection  Shower or bathe the night before and morning of your surgery. Follow the instructions your clinic gave you. (If no instructions, use regular soap.)  Don't shave or clip hair near your surgery site. We'll remove the hair if needed.  Don't smoke or vape the morning of surgery. You may chew nicotine gum up to 2 hours before surgery. A nicotine patch is okay.  Note: Some surgeries require you to completely quit smoking and nicotine. Check with your surgeon.  Your care team will make every effort to keep you safe from infection. We will:  Clean our hands often with soap and water (or an alcohol-based hand rub).  Clean the skin at your surgery site with a special soap that kills germs.  Give you a special gown to keep you warm. (Cold raises the risk of infection.)  Wear special hair covers, masks, gowns and gloves during surgery.  Give antibiotic medicine, if prescribed. Not all surgeries need antibiotics.  What to bring on the day of surgery  Photo ID and insurance card  Copy of your health care directive, if you have one  Glasses and hearing aids (bring cases)  You can't wear  contacts during surgery  Inhaler and eye drops, if you use them (tell us about these when you arrive)  CPAP machine or breathing device, if you use them  A few personal items, if spending the night  If you have . . .  A pacemaker, ICD (cardiac defibrillator) or other implant: Bring the ID card.  An implanted stimulator: Bring the remote control.  A legal guardian: Bring a copy of the certified (court-stamped) guardianship papers.  Please remove any jewelry, including body piercings. Leave jewelry and other valuables at home.  If you're going home the day of surgery  You must have a responsible adult drive you home. They should stay with you overnight as well.  If you don't have someone to stay with you, and you aren't safe to go home alone, we may keep you overnight. Insurance often won't pay for this.  After surgery  If it's hard to control your pain or you need more pain medicine, please call your surgeon's office.  Questions?   If you have any questions for your care team, list them here: _________________________________________________________________________________________________________________________________________________________________________ ____________________________________ ____________________________________ ____________________________________  For informational purposes only. Not to replace the advice of your health care provider. Copyright   2003, 2019 [facility]. All rights reserved. Clinically reviewed by Ashlyn Stone MD. Cswitch [id number] - REV 12/22.

## 2024-06-13 ENCOUNTER — MYC MEDICAL ADVICE (OUTPATIENT)
Dept: FAMILY MEDICINE | Facility: CLINIC | Age: 64
End: 2024-06-13
Payer: COMMERCIAL

## 2024-06-13 DIAGNOSIS — Z01.818 PREOP GENERAL PHYSICAL EXAM: Primary | ICD-10-CM

## 2024-06-17 PROBLEM — Z71.89 ADVANCED DIRECTIVES, COUNSELING/DISCUSSION: Status: RESOLVED | Noted: 2017-04-03 | Resolved: 2024-06-17

## 2024-06-23 ENCOUNTER — LAB (OUTPATIENT)
Dept: LAB | Facility: CLINIC | Age: 64
End: 2024-06-23
Payer: COMMERCIAL

## 2024-06-23 DIAGNOSIS — Z01.818 PREOP GENERAL PHYSICAL EXAM: ICD-10-CM

## 2024-06-23 LAB
ALBUMIN UR-MCNC: NEGATIVE MG/DL
APPEARANCE UR: CLEAR
BILIRUB UR QL STRIP: NEGATIVE
COLOR UR AUTO: YELLOW
GLUCOSE UR STRIP-MCNC: NEGATIVE MG/DL
HGB UR QL STRIP: NEGATIVE
KETONES UR STRIP-MCNC: NEGATIVE MG/DL
LEUKOCYTE ESTERASE UR QL STRIP: NEGATIVE
NITRATE UR QL: NEGATIVE
PH UR STRIP: 6 [PH] (ref 5–7)
SP GR UR STRIP: 1.01 (ref 1–1.03)
UROBILINOGEN UR STRIP-MCNC: NORMAL MG/DL

## 2024-06-23 PROCEDURE — 81003 URINALYSIS AUTO W/O SCOPE: CPT

## 2024-06-27 ENCOUNTER — TELEPHONE (OUTPATIENT)
Dept: UROLOGY | Facility: CLINIC | Age: 64
End: 2024-06-27
Payer: COMMERCIAL

## 2024-06-27 NOTE — TELEPHONE ENCOUNTER
Note below reviewed by writer. Message sent to Dr. Wayne with request to review and advise.    Tracy Richardson RN, BSN

## 2024-06-27 NOTE — TELEPHONE ENCOUNTER
[facility] Call Center    Phone Message    May a detailed message be left on voicemail: yes     Reason for Call: Other: pt wife calling, pt had knee surgery and from that he was not able to urinate so they cath him yesterday and needs to be seen in one week for cath change and or TOV, please call pt     Action Taken: Other: urology    Travel Screening: Not Applicable     Date of Service:

## 2024-06-28 NOTE — TELEPHONE ENCOUNTER
Pradeep Wayne MD  You12 hours ago (8:22 PM)     Yes, okay to schedule a TOV next week.  I recommend Flomax 0.4 mg a day if he is not already taking it.  As long as his PVR is under 250 mL, I would be okay with him not having the catheter replaced.  If he is significantly over that, his option is learning SIC or replacing the Rossi.  Thank You  Krishna BRICE     Called and spoke to patient who is aware of the above information. Patient reports that he has been taking flomax 0.4mg daily and they started this for him at the hospital. Informed patient about the trial of void process. Patient verbalized understanding. Nurse visit for TOV scheduled for July 2nd at 9:00am. Patient aware of clinic location and to check in at Desk DAlida Richardson RN, BSN

## 2024-07-02 ENCOUNTER — ALLIED HEALTH/NURSE VISIT (OUTPATIENT)
Dept: NURSING | Facility: CLINIC | Age: 64
End: 2024-07-02
Payer: COMMERCIAL

## 2024-07-02 ENCOUNTER — MYC MEDICAL ADVICE (OUTPATIENT)
Dept: UROLOGY | Facility: CLINIC | Age: 64
End: 2024-07-02

## 2024-07-02 DIAGNOSIS — R33.9 URINARY RETENTION: ICD-10-CM

## 2024-07-02 DIAGNOSIS — Z96.0 URINARY CATHETER IN PLACE: Primary | ICD-10-CM

## 2024-07-02 PROCEDURE — 51702 INSERT TEMP BLADDER CATH: CPT

## 2024-07-02 PROCEDURE — 51798 US URINE CAPACITY MEASURE: CPT

## 2024-07-02 RX ORDER — CIPROFLOXACIN 500 MG/1
500 TABLET, FILM COATED ORAL ONCE
Status: CANCELLED | OUTPATIENT
Start: 2024-07-02 | End: 2024-07-02

## 2024-07-02 RX ORDER — CEPHALEXIN 500 MG/1
500 CAPSULE ORAL ONCE
Status: COMPLETED | OUTPATIENT
Start: 2024-07-02 | End: 2024-07-02

## 2024-07-02 RX ADMIN — CEPHALEXIN 500 MG: 500 CAPSULE ORAL at 08:54

## 2024-07-02 NOTE — NURSING NOTE
"Benjamín Mendoza comes into clinic today at the request of Dr. Wayne Ordering Provider for TOV (trial of void) for diagnosis of urinary cathter in place and urinary retention.    Medication given per protocol  Cipro 500mg po  Lot: [id number]   Exp: 2/2025  NDC: 3 [id number]    Trial void performed as recommended by Dr. Wayne via written order. Instilled 850 ml of normal saline via Bartlett cath. Pt expressed fullness and urgency.  10cc balloon deflated, catheter removed with out difficulty. Pt voided 350 ml's. Post void residual bladder scan was: 743 ml.  Pt tolerated procedure without difficulty. Per note from Dr. Wayne, if PVR >250mL then replace bartlett catheter or teach CIC.    With written and verbal instructions, patient was educated on clean intermittent catheterization, however patient declined to proceed with completion of self-catheterization. Patient stated, \"I would rather just have a catheter put back in.\"    Catheter insertion documentation on 7/2/2024:    Benjamín Mendoza presents to the clinic for catheter insertion.  Reason for insertion: urinary retention  Catheter successfully inserted into the urethral meatus in the usual sterile fashion without immediate complication.  Type of catheter placed: 16 Persian latex Coude indwelling catheter  Urine is yellow in color.  800 cc's of urine output returned.  Balloon was filled with 10cc's of normal saline.  Securement device placed for the catheter.  The patient tolerated the procedure and was instructed to call with any questions or concerns and follow up on 7/10/24 at 9:15am for a visit with Dr. Wayne.    This service provided today was under the supervising provider of the day Dr. Contreras, who was available if needed.    Tracy Richardson RN, BSN            "

## 2024-07-10 ENCOUNTER — TELEPHONE (OUTPATIENT)
Dept: UROLOGY | Facility: CLINIC | Age: 64
End: 2024-07-10

## 2024-07-10 ENCOUNTER — OFFICE VISIT (OUTPATIENT)
Dept: UROLOGY | Facility: CLINIC | Age: 64
End: 2024-07-10
Payer: COMMERCIAL

## 2024-07-10 DIAGNOSIS — R33.9 URINARY RETENTION: Primary | ICD-10-CM

## 2024-07-10 PROCEDURE — 51798 US URINE CAPACITY MEASURE: CPT | Performed by: UROLOGY

## 2024-07-10 PROCEDURE — 99214 OFFICE O/P EST MOD 30 MIN: CPT | Mod: 25 | Performed by: UROLOGY

## 2024-07-10 RX ORDER — TAMSULOSIN HYDROCHLORIDE 0.4 MG/1
0.4 CAPSULE ORAL DAILY
Qty: 90 CAPSULE | Refills: 3 | Status: SHIPPED | OUTPATIENT
Start: 2024-07-10

## 2024-07-10 RX ADMIN — Medication 500 MG: at 10:12

## 2024-07-10 NOTE — NURSING NOTE
Trial void performed as recommended by Dr. Wayne. Instilled 875 ml ml of normal saline via Rossi cath. Pt expressed fullness and urgency.  10 cc balloon deflated, catheter removed with out difficulty. Pt voided 575 ml and results recorded. Post void residual bladder scan was: 466-527 ml.  Pt tolerated procedure without difficulty. Results reported to Dr. Wayne. Patient okay to be sent home without catheter. Patient needed to be taught CIC by RN. Patient is going to try to void then complete CIC. Patient is to record his Post void residuals for one week then check back with the clinic. Reminder message sent to follow up with patient.       Ivory Ashton LPN on 7/10/2024 at 10:57 AM

## 2024-07-10 NOTE — NURSING NOTE
Benjamín Mendoza comes into clinic today at the request of Dr. Wayne ordering provider for trial of void and CIC teaching.    Per Dr. Wayne, patient to complete CIC four times per day with use of 16 Lao coude catheter for diagnosis of chronic Post- TKA acute urinary retention which is expected to last greater than 90 days.  Per Dr. Wayne patient requires coude catheter as there is difficulty passing straight catheter.    With written and verbal instructions, patient and significant other were educated on clean intermittent catheterization. Patient declined to complete CIC in clinic today.  He is aware to complete this four times per day. Informed patient to call the clinic with any questions or concerns.   Phone number for Urology Department provided to pt in clinic along with bladder diary and instructions on how to complete each day reviewed.      Informed pt we will follow up with him in one week to see how things are going.  Supply order being sent in on his behalf.  Supply company will reach out to pt directly.   Pt and significant other verbalized understanding.     This service provided today was under the supervising provider of the day Dr. Wayne, who was available if needed.    Adelaida Aj RN  7/10/2024  11:04 AM

## 2024-07-10 NOTE — PATIENT INSTRUCTIONS
Cystoscopy    What is a Cystoscopy?  This is a procedure done to check for problems inside the bladder. Problems may include polyps (growths), tumors, inflammation (swelling and redness) and other concerns.    The doctor inserts a thin tube (called a cystoscope) into the bladder. The tube is about the size of a pencil. We will clean the area with special soap to remove bacteria and prevent post-procedure infection. We will give you numbing medicine (Lidocaine jelly) to reduce the pain or discomfort you may feel.    The tube allows the doctor to:  The doctor will be able to see inside the bladder by filling the bladder with water. The water makes it easier to see any problems that may be present.    If needed, the doctor may use the tube to:  The doctor is able to take tissue samples (biopsies). Samples are sent to the lab for testing.  The doctor can also burn off any small growths or tumors that are found. This is call fulguration.    How should I get ready for the exam?  There is no special preparation you need to do for this exam. Staff may ask for a urine sample prior to rooming you. You may eat and drink a normal diet before and after the exam. Medications may be taken as usual unless otherwise directed by the physician.    Please tell your doctor if:  You have a history of urinary tract infections.  You know that you have a tumor in your bladder.  You have bleeding problems.  You have any allergies.  You are or may be pregnant.    What happens after the exam?  You may go back to your normal diet and activity as you feel ready, unless your doctor tells you not to.    For the next two days, you may notice:  Some blood in your urine.  Some burning when you urinate (use the toilet).  An urge to urinate more often.  Bladder spasms.    These are normal after the procedure. They should go away on their own after a day or two.      You can help to relieve the above listed symptoms by:  Drinking 6 to 8 large glasses of  water each day (includes drinks at meals).  This will help clear the urine.  Take warm baths to relieve pain and bladder spasms.  Do not add anything to the bath water.  Your doctor may prescribe pain medicine.  You may also take Tylenol (acetaminophen) for pain.    When should I call my doctor?  A fever over 100.0 F (38 C) for more than a day.  (Before you call the doctor, check your temperature under your tongue.)  Chills.  Failure to urinate: No urine comes out when you try to use the toilet.  (Try soaking in a bathtub full of warm water.  If still no urine, call your doctor.)  A lot of blood in the urine or blood clots larger than a nickel.  Pain in the back or abdomen (belly / stomach area).  Pain or spasms that are not relieved by warm tub baths and pain medicine.  Severe pain, burning or other problems while passing urine.  Pain that gets worse after two days.    URODYNAMIC TESTING    Where should I go for this test?  The procedure is performed at:     [facility]   [address] [place] [address]   Floor 4    If you have questions about your test, please call our nurse triage line at [phone], option #2. If you need to cancel or reschedule your test for any reason, please notify us as soon as possible.    Please check in approximately 15 minutes prior to your procedure time.      What is urodynamic testing?   Urodynamic testing refers to a group of tests used to assess bladder function by measuring various aspects of urine storage and emptying. The test takes about 75 minutes. For most patients, the test is not painful.     How should I get ready for this test?  Please try to arrive with a comfortably full bladder if possible because you will be asked to try and urinate prior to your study.  If you received a bladder diary, please complete this prior to your urodynamic test and bring it with you to your appointment. A bladder diary measures  how much fluid you are drinking and how often and how much you are urinating. You can also record any urinary leakage that may have occurred and what you were doing when you leaked.    If you have chronic constipation, please take stool softeners for two days before your test.    What happens during the test?  You will first be asked to empty your bladder in a private restroom into a special toilet called a uroflow machine which measures the rate of urine flow.  A nurse will then place a very small tube (called a catheter) into your bladder. This drains any urine left over after urinating and also measures the pressures inside of your bladder during your test. Another small catheter will then be placed into your rectum to measure abdominal pressures. Two small sticky patches will be placed on the skin near your anus to measure pelvic floor function.   We will then instill contrast dye into your bladder through the bladder catheter. The contrast is very dense and will allow us to take x-ray pictures of your bladder intermittently during your test.  You will be asked to tell us when you first start to feel like your bladder is filling up, when you have moderate urgency to urinate, when you have very strong urgency to urinate and finally when you feel that your bladder is full. Once you feel full you will be asked to try and urinate.    You may be asked to cough or bear down several times during your procedure. The provider running your study will be looking for urine leakage during this time.      What happens after the test?  The provider running your urodynamic study will share the results of your test on the day of your procedure or very soon after.  After your test, you may go about your day as normal. You may notice some blood in your urine for a couple of days which should clear up on its own. You may also feel a more urgent need to use the toilet or you may need to go more often - this is due to having a  catheter placed and should resolve on its own in a few days.

## 2024-07-10 NOTE — PROGRESS NOTES
It is a pleasure to see Mr. Mendoza in urology clinic today.  I saw him 6 to 7 weeks ago for discussion on Peyronie's disease.  PD was not discussed today.    Today, he is here to follow-up urinary retention.  He is now about 10 days out from left knee replacement.  He had urinary retention, and a Rossi catheter was placed after surgery.  He failed a trial of void last week, unable to void.  Here for MD follow-up of this.    He has been taking tamsulosin for the last week plus.  He is off narcotic pain medications at this time.  I do not see the exact record in his file, but patient states he had a ~2400cc cathed residual.  He states he had no bothersome lower urinary tract symptoms before the left TKA surgery.  Could start the stream easy, had good force of stream, was not having nocturia, etc.  No previous obstructive or irritative LUTS.    That notwithstanding, I discussed with him it would generally not be likely to acutely develop a greater than 2 L bladder capacity, so he likely has some degree of chronic urinary retention.  He has acute urinary retention after orthopedic surgery might be an acute on chronic situation.    He has been on tamsulosin, and I renewed this prescription for him today.       There were no vitals taken for this visit.    General: Alert, oriented, nad  Eyes: anicteric, EOMI.  Pulse: regular  Resps: normal, non-labored.  Abdomen:  nondistended.   exam deferred.     Creatinine normal 0.92, 6/26/2024  Creatinine 1 month prior was 0.82    Assessment  Postop urinary retention-may be acute on chronic.    Plan:    TOV done today: 875 mL instilled, patient voided 575 mL, with PVR bladder scan 466-527 mL (true value more likely 300 cc)  He was instructed on CIC today.  Left clinic without a Rossi catheter.  Antibiotic dose given for TOV  Because he is still retaining a decent amount, I am going to tentatively set him up for urodynamics and have him RTC for a cystoscopy with Dr. Contreras, to further  workup his bladder emptying and retention.  He can start with whichever procedure he can get scheduled first.      I'm happy to see him back in the future as needed.     Krishna BRICE    --------------------------------------------------------------------------------------------------------------------   Additional Coding Information:    Problems:  3 -- one acute uncomplicated illness or injury    Data Reviewed  N/A     Tests ordered/pending: urodynamic studies     Level of risk:  4 -- prescription drug management    Time spent:  31 minutes spent on the date of the encounter doing chart review, history and exam, documentation and further activities per the note

## 2024-07-10 NOTE — TELEPHONE ENCOUNTER
7/10 Placed UA/UC order for upcoming cystoscopy with  on 7/25/24    Neetu Jason MA on 7/10/2024 at 3:19 PM

## 2024-07-10 NOTE — NURSING NOTE
Benjamín Mendoza's goals for this visit include:   Chief Complaint   Patient presents with    RECHECK     discuss urinary retention and next steps, possible TOV vs. CIC re-teaching       He requests these members of his care team be copied on today's visit information:     PCP: Aleida Larson    Referring Provider:  Referred Self, MD  No address on file    There were no vitals taken for this visit.    Do you need any medication refills at today's visit?     Neetu Jason MA on 7/10/2024 at 9:21 AM

## 2024-07-11 ENCOUNTER — TELEPHONE (OUTPATIENT)
Dept: FAMILY MEDICINE | Facility: CLINIC | Age: 64
End: 2024-07-11
Payer: COMMERCIAL

## 2024-07-11 DIAGNOSIS — E78.5 HYPERLIPIDEMIA, UNSPECIFIED HYPERLIPIDEMIA TYPE: ICD-10-CM

## 2024-07-11 DIAGNOSIS — K21.9 GASTROESOPHAGEAL REFLUX DISEASE WITHOUT ESOPHAGITIS: ICD-10-CM

## 2024-07-12 RX ORDER — ATORVASTATIN CALCIUM 40 MG/1
TABLET, FILM COATED ORAL
Qty: 90 TABLET | Refills: 0 | Status: SHIPPED | OUTPATIENT
Start: 2024-07-12

## 2024-07-15 ENCOUNTER — TRANSFERRED RECORDS (OUTPATIENT)
Dept: HEALTH INFORMATION MANAGEMENT | Facility: CLINIC | Age: 64
End: 2024-07-15
Payer: COMMERCIAL

## 2024-07-17 ENCOUNTER — TELEPHONE (OUTPATIENT)
Dept: FAMILY MEDICINE | Facility: CLINIC | Age: 64
End: 2024-07-17
Payer: COMMERCIAL

## 2024-07-17 NOTE — TELEPHONE ENCOUNTER
Patient had left total knee replacement 3 weeks ago [place].  Patient states he believes that the incision is infected.  States spoke with a triage nurse at the hospital that told him he needs to see his primary care provider within 4 hrs. States needs an antibiotic.  I asked patient who did his knee surgery; he states Dr. Jermaine Juárez at [facility]  He had called the [facility] and spoke to a nurse.  I instructed patient to call [facility] to speak with someone on their care team regarding his incision post surgery.  He states he will call [facility] regarding concerns that his incision site is infected.  Needing appointment with orthopedic team post surgery.  Harini CORADO RN

## 2024-08-07 ENCOUNTER — OFFICE VISIT (OUTPATIENT)
Dept: FAMILY MEDICINE | Facility: CLINIC | Age: 64
End: 2024-08-07
Payer: COMMERCIAL

## 2024-08-07 ENCOUNTER — ANCILLARY PROCEDURE (OUTPATIENT)
Dept: GENERAL RADIOLOGY | Facility: CLINIC | Age: 64
End: 2024-08-07
Attending: PHYSICIAN ASSISTANT
Payer: COMMERCIAL

## 2024-08-07 VITALS
DIASTOLIC BLOOD PRESSURE: 80 MMHG | OXYGEN SATURATION: 99 % | TEMPERATURE: 98.1 F | BODY MASS INDEX: 27.11 KG/M2 | SYSTOLIC BLOOD PRESSURE: 132 MMHG | HEIGHT: 69 IN | WEIGHT: 183 LBS | RESPIRATION RATE: 17 BRPM | HEART RATE: 67 BPM

## 2024-08-07 DIAGNOSIS — E78.5 HYPERLIPIDEMIA, UNSPECIFIED HYPERLIPIDEMIA TYPE: ICD-10-CM

## 2024-08-07 DIAGNOSIS — R53.83 FATIGUE, UNSPECIFIED TYPE: Primary | ICD-10-CM

## 2024-08-07 DIAGNOSIS — R05.2 SUBACUTE COUGH: ICD-10-CM

## 2024-08-07 DIAGNOSIS — D64.9 ANEMIA, UNSPECIFIED TYPE: ICD-10-CM

## 2024-08-07 LAB
ALBUMIN SERPL BCG-MCNC: 4.5 G/DL (ref 3.5–5.2)
ALP SERPL-CCNC: 76 U/L (ref 40–150)
ALT SERPL W P-5'-P-CCNC: 16 U/L (ref 0–70)
ANION GAP SERPL CALCULATED.3IONS-SCNC: 11 MMOL/L (ref 7–15)
AST SERPL W P-5'-P-CCNC: 25 U/L (ref 0–45)
BILIRUB SERPL-MCNC: 0.2 MG/DL
BUN SERPL-MCNC: 15.2 MG/DL (ref 8–23)
CALCIUM SERPL-MCNC: 9.2 MG/DL (ref 8.8–10.4)
CHLORIDE SERPL-SCNC: 105 MMOL/L (ref 98–107)
CHOLEST SERPL-MCNC: 141 MG/DL
CREAT SERPL-MCNC: 0.81 MG/DL (ref 0.67–1.17)
EGFRCR SERPLBLD CKD-EPI 2021: >90 ML/MIN/1.73M2
ERYTHROCYTE [DISTWIDTH] IN BLOOD BY AUTOMATED COUNT: 12.8 % (ref 10–15)
FASTING STATUS PATIENT QL REPORTED: NO
FASTING STATUS PATIENT QL REPORTED: NO
GLUCOSE SERPL-MCNC: 123 MG/DL (ref 70–99)
HCO3 SERPL-SCNC: 26 MMOL/L (ref 22–29)
HCT VFR BLD AUTO: 38.2 % (ref 40–53)
HDLC SERPL-MCNC: 43 MG/DL
HGB BLD-MCNC: 12.2 G/DL (ref 13.3–17.7)
LDLC SERPL CALC-MCNC: 71 MG/DL
MCH RBC QN AUTO: 28.6 PG (ref 26.5–33)
MCHC RBC AUTO-ENTMCNC: 31.9 G/DL (ref 31.5–36.5)
MCV RBC AUTO: 90 FL (ref 78–100)
NONHDLC SERPL-MCNC: 98 MG/DL
PLATELET # BLD AUTO: 159 10E3/UL (ref 150–450)
POTASSIUM SERPL-SCNC: 4.5 MMOL/L (ref 3.4–5.3)
PROT SERPL-MCNC: 6.8 G/DL (ref 6.4–8.3)
RBC # BLD AUTO: 4.26 10E6/UL (ref 4.4–5.9)
SODIUM SERPL-SCNC: 142 MMOL/L (ref 135–145)
TRIGL SERPL-MCNC: 137 MG/DL
TSH SERPL DL<=0.005 MIU/L-ACNC: 3.1 UIU/ML (ref 0.3–4.2)
VIT D+METAB SERPL-MCNC: 46 NG/ML (ref 20–50)
WBC # BLD AUTO: 5.7 10E3/UL (ref 4–11)

## 2024-08-07 PROCEDURE — 83540 ASSAY OF IRON: CPT | Performed by: PHYSICIAN ASSISTANT

## 2024-08-07 PROCEDURE — 82728 ASSAY OF FERRITIN: CPT | Performed by: PHYSICIAN ASSISTANT

## 2024-08-07 PROCEDURE — 71046 X-RAY EXAM CHEST 2 VIEWS: CPT | Mod: TC | Performed by: INTERNAL MEDICINE

## 2024-08-07 PROCEDURE — 82306 VITAMIN D 25 HYDROXY: CPT | Performed by: PHYSICIAN ASSISTANT

## 2024-08-07 PROCEDURE — 36415 COLL VENOUS BLD VENIPUNCTURE: CPT | Performed by: PHYSICIAN ASSISTANT

## 2024-08-07 PROCEDURE — 82607 VITAMIN B-12: CPT | Performed by: PHYSICIAN ASSISTANT

## 2024-08-07 PROCEDURE — 87798 DETECT AGENT NOS DNA AMP: CPT | Mod: 59 | Performed by: PHYSICIAN ASSISTANT

## 2024-08-07 PROCEDURE — 83550 IRON BINDING TEST: CPT | Performed by: PHYSICIAN ASSISTANT

## 2024-08-07 PROCEDURE — 84443 ASSAY THYROID STIM HORMONE: CPT | Performed by: PHYSICIAN ASSISTANT

## 2024-08-07 PROCEDURE — 85027 COMPLETE CBC AUTOMATED: CPT | Performed by: PHYSICIAN ASSISTANT

## 2024-08-07 PROCEDURE — 80053 COMPREHEN METABOLIC PANEL: CPT | Performed by: PHYSICIAN ASSISTANT

## 2024-08-07 PROCEDURE — 80061 LIPID PANEL: CPT | Performed by: PHYSICIAN ASSISTANT

## 2024-08-07 PROCEDURE — G2211 COMPLEX E/M VISIT ADD ON: HCPCS | Performed by: PHYSICIAN ASSISTANT

## 2024-08-07 PROCEDURE — 87468 ANAPLSMA PHGCYTOPHLM AMP PRB: CPT | Performed by: PHYSICIAN ASSISTANT

## 2024-08-07 PROCEDURE — 86618 LYME DISEASE ANTIBODY: CPT | Performed by: PHYSICIAN ASSISTANT

## 2024-08-07 PROCEDURE — 99214 OFFICE O/P EST MOD 30 MIN: CPT | Performed by: PHYSICIAN ASSISTANT

## 2024-08-07 ASSESSMENT — PAIN SCALES - GENERAL: PAINLEVEL: NO PAIN (0)

## 2024-08-07 ASSESSMENT — ENCOUNTER SYMPTOMS
FATIGUE: 1
COUGH: 1

## 2024-08-07 NOTE — PROGRESS NOTES
Assessment & Plan     Fatigue, unspecified type  Possibly multifactorial, for now we will await lab results  - Vitamin D Deficiency; Future  - CBC with platelets; Future  - Comprehensive metabolic panel; Future  - TSH with free T4 reflex; Future  - LYME DISEASE TOTAL ANTIBODIES WITH REFLEX TO CONFIRMATION; Future  - Tick-Borne Disease Panel by PCR; Future  - Vitamin D Deficiency  - CBC with platelets  - Comprehensive metabolic panel  - TSH with free T4 reflex  - LYME DISEASE TOTAL ANTIBODIES WITH REFLEX TO CONFIRMATION  - Tick-Borne Disease Panel by PCR  Encouraged a healthy diet rich in a variety of fruits vegetables and lean proteins and routine exercise  Also encouraged him to discuss his overwhelming fatigue that he gets intermittently with his sleep specialist    Subacute cough  He has been on a number of antibiotics in the last year, we will hold off to see if his symptoms progressively worsen or if they continue.  Could consider Mucinex for benzonatate if it continues   - XR Chest 2 Views; Future    Hyperlipidemia, unspecified hyperlipidemia type    - Lipid panel reflex to direct LDL Fasting        MED REC REQUIRED  Post Medication Reconciliation Status: patient was not discharged from an inpatient facility or TCU    This chart documentation was completed in part with Dragon voice recognition software.  Documentation is reviewed after completion, however, some words and grammatical errors may remain.  Aleida Larson PA-C      Amirah Butts is a 64 year old, presenting for the following health issues:  Cough and Fatigue      8/7/2024    10:54 AM   Additional Questions   Roomed by VE     Cough    Fatigue  Associated symptoms include coughing and fatigue.          Acute Illness  Acute illness concerns: cough, it can be productive at times.  He always has to clear out his throat in the morning  Onset/Duration: Since May, it waxes and wanes he has been treated with multiple rounds of antibiotics for sinus  "symptoms  Symptoms:  Fever: No  Chills/Sweats: No  Headache (location?): No  Sinus Pressure: No not currently  Conjunctivitis:  No  Ear Pain: no  Rhinorrhea: No  Congestion: No  Sore Throat: YES- occasionally hurts in the morning  Cough: YES-productive of green sputum but sometimes the sputum is clear  Wheeze: No  Decreased Appetite: YES  Nausea: YES  Vomiting: No  Diarrhea: No  Dysuria/Freq.: No  Dysuria or Hematuria: No  Fatigue/Achiness: YES he goes through episodes about every 2 weeks or so where he is so tired he sleeps all day he is not slightly nauseous achy and a little dizzy.  Once he sleeps for the day he feels better.  He states he just cannot keep his eyes open on this day when he feels generally poorly.  He has had a history of insomnia and he has never slept well but this is distinctly different than his typical insomnia fatigue.  Did have mono a few weeks ago his wife wanted him to mention that but he does not think it has anything to do with his fatigue because he has been having this much longer  His granddaughter sick/Strep Exposure: No  Therapies tried and outcome: flonase, netti pot            Review of Systems  Constitutional, HEENT, cardiovascular, pulmonary, gi and gu systems are negative, except as otherwise noted.      Objective    /80 (BP Location: Left arm, Patient Position: Chair, Cuff Size: Adult Regular)   Pulse 67   Temp 98.1  F (36.7  C) (Temporal)   Resp 17   Ht 1.753 m (5' 9\")   Wt 83 kg (183 lb)   SpO2 99%   BMI 27.02 kg/m    Body mass index is 27.02 kg/m .  Physical Exam   GENERAL: alert and no distress  HENT: ear canals and TM's normal, nose and mouth without ulcers or lesions  NECK: no adenopathy, no asymmetry, masses, or scars  RESP: lungs clear to auscultation - no rales, rhonchi or wheezes  CV: regular rate and rhythm, normal S1 S2, no S3 or S4, no murmur, click or rub, no peripheral edema  ABDOMEN: soft, nontender, no hepatosplenomegaly, no masses and bowel " sounds normal  MS: no gross musculoskeletal defects noted, no edema  PSYCH: mentation appears normal, affect normal/bright    Results for orders placed or performed in visit on 08/07/24   XR Chest 2 Views     Status: None    Narrative    XR CHEST 2 VIEWS 8/7/2024 11:33 AM    HISTORY: Subacute cough    COMPARISON: 4/3/2017    FINDINGS/    Impression    IMPRESSION: Normal heart size. No focal consolidation or pulmonary  edema. No pleural effusion or pneumothorax. No acute osseous  abnormality.    GRUPO GAGE MD         SYSTEM ID:  [id number]   Results for orders placed or performed in visit on 08/07/24   CBC with platelets     Status: Abnormal   Result Value Ref Range    WBC Count 5.7 4.0 - 11.0 10e3/uL    RBC Count 4.26 (L) 4.40 - 5.90 10e6/uL    Hemoglobin 12.2 (L) 13.3 - 17.7 g/dL    Hematocrit 38.2 (L) 40.0 - 53.0 %    MCV 90 78 - 100 fL    MCH 28.6 26.5 - 33.0 pg    MCHC 31.9 31.5 - 36.5 g/dL    RDW 12.8 10.0 - 15.0 %    Platelet Count 159 150 - 450 10e3/uL           Signed Electronically by: Aleida Larson PA-C

## 2024-08-07 NOTE — LETTER
August 13, 2024    Benjamín Mendoza  [address] [facility] [address]      Dear ,    We are writing to inform you of your test results.    -Hemoglobin is decreased indicating anemia.  Anemia can cause fatigue and, occasionally, light-headedness.  ADVISE: getting additional studies to determine the exact cause of your anemia.  I am going to add some additional lab work to the blood that was drawn yesterday.  If I am unable to order all that is necessary, I will have you make a lab only appointment to complete the rest.  This may be contributing to how tired you are.  Please let me know if you notice any blood in your stool, excessive nosebleeds ,or any other unusual bleeding that you have noticed.  -White blood cell and platelet counts are normal.  -Cholesterol levels are at your goal levels.  ADVISE: continuing your medication, a regular exercise program with at least 150 minutes of aerobic exercise per week, and eating a low saturated fat/low carbohydrate diet.  Also, you should recheck this fasting cholesterol panel in 12 months.  -Liver and gallbladder tests (ALT,AST, Alk phos,bilirubin) are normal.  -Kidney function (GFR) is normal.  -Sodium is normal.  -Potassium is normal.  -Calcium is normal.  -Glucose is mildly elevated but you were nonfasting and this is okay..  -TSH (thyroid stimulating hormone) level is normal which indicates normal thyroid function.  -Vitamin D level is normal and getting 1000 IU daily in your diet or supplements is recommended.  For additional lab test information, labtestsonline.org is an excellent reference.    In the labs that I added the other morning, it does look like you have a low iron level.  Please start taking an iron supplement over-the-counter daily.  I recommend Vitron C.  It is a combination of iron and vitamin C which is necessary to help your body absorb the iron.  Lets have you take that daily for 2 to 4 weeks and then recheck your blood levels.  Your  Lyme test came back negative and your vitamin B12 level is normal     Your tick borne illness screening labs are negative.        Resulted Orders   Vitamin D Deficiency   Result Value Ref Range    Vitamin D, Total (25-Hydroxy) 46 20 - 50 ng/mL      Comment:      optimum levels    Narrative    Season, race, dietary intake, and treatment affect the concentration of 25-hydroxy-Vitamin D. Values may decrease during winter months and increase during summer months.    Vitamin D determination is routinely performed by an immunoassay specific for 25 hydroxyvitamin D3.  If an individual is on vitamin D2(ergocalciferol) supplementation, please specify 25 OH vitamin D2 and D3 level determination by LCMSMS test VITD23.     CBC with platelets   Result Value Ref Range    WBC Count 5.7 4.0 - 11.0 10e3/uL    RBC Count 4.26 (L) 4.40 - 5.90 10e6/uL    Hemoglobin 12.2 (L) 13.3 - 17.7 g/dL    Hematocrit 38.2 (L) 40.0 - 53.0 %    MCV 90 78 - 100 fL    MCH 28.6 26.5 - 33.0 pg    MCHC 31.9 31.5 - 36.5 g/dL    RDW 12.8 10.0 - 15.0 %    Platelet Count 159 150 - 450 10e3/uL   Comprehensive metabolic panel   Result Value Ref Range    Sodium 142 135 - 145 mmol/L    Potassium 4.5 3.4 - 5.3 mmol/L    Carbon Dioxide (CO2) 26 22 - 29 mmol/L    Anion Gap 11 7 - 15 mmol/L    Urea Nitrogen 15.2 8.0 - 23.0 mg/dL    Creatinine 0.81 0.67 - 1.17 mg/dL    GFR Estimate >90 >60 mL/min/1.73m2      Comment:      eGFR calculated using 2021 CKD-EPI equation.    Calcium 9.2 8.8 - 10.4 mg/dL      Comment:      Reference intervals for this test were updated on 7/16/2024 to reflect our healthy population more accurately. There may be differences in the flagging of prior results with similar values performed with this method. Those prior results can be interpreted in the context of the updated reference intervals.    Chloride 105 98 - 107 mmol/L    Glucose 123 (H) 70 - 99 mg/dL    Alkaline Phosphatase 76 40 - 150 U/L    AST 25 0 - 45 U/L    ALT 16 0 - 70 U/L     Protein Total 6.8 6.4 - 8.3 g/dL    Albumin 4.5 3.5 - 5.2 g/dL    Bilirubin Total 0.2 <=1.2 mg/dL    Patient Fasting > 8hrs? No    TSH with free T4 reflex   Result Value Ref Range    TSH 3.10 0.30 - 4.20 uIU/mL   If you have any questions or concerns, please call the clinic at the number listed above.       Sincerely,      Aleida Larson PA-C

## 2024-08-08 ENCOUNTER — MYC MEDICAL ADVICE (OUTPATIENT)
Dept: FAMILY MEDICINE | Facility: CLINIC | Age: 64
End: 2024-08-08
Payer: COMMERCIAL

## 2024-08-08 LAB
A PHAGOCYTOPH DNA BLD QL NAA+PROBE: NOT DETECTED
B BURGDOR IGG+IGM SER QL: 0.07
BABESIA DNA BLD QL NAA+PROBE: NOT DETECTED
EHRLICHIA DNA SPEC QL NAA+PROBE: NOT DETECTED
FERRITIN SERPL-MCNC: 87 NG/ML (ref 31–409)
IRON BINDING CAPACITY (ROCHE): 308 UG/DL (ref 240–430)
IRON SATN MFR SERPL: 13 % (ref 15–46)
IRON SERPL-MCNC: 41 UG/DL (ref 61–157)
VIT B12 SERPL-MCNC: 1011 PG/ML (ref 232–1245)

## 2024-08-08 NOTE — LETTER
August 13, 2024      Benjamín Mendoza  [address] [facility] [id number]        Dear ,    We are writing to inform you of your test results.    {results letter list:[id number]}    No results found from the In Basket message.    If you have any questions or concerns, please call the clinic at the number listed above.       Sincerely,

## 2024-08-29 NOTE — TELEPHONE ENCOUNTER
Confirmed letter was sent to pt 8/13/24, this draft is a duplicate made in error. No further action needed, closing encounter    Cristin Rey MA

## 2024-10-10 DIAGNOSIS — E78.5 HYPERLIPIDEMIA, UNSPECIFIED HYPERLIPIDEMIA TYPE: ICD-10-CM

## 2024-10-10 RX ORDER — ATORVASTATIN CALCIUM 40 MG/1
TABLET, FILM COATED ORAL
Qty: 90 TABLET | Refills: 2 | Status: SHIPPED | OUTPATIENT
Start: 2024-10-10

## 2024-10-12 ENCOUNTER — HEALTH MAINTENANCE LETTER (OUTPATIENT)
Age: 64
End: 2024-10-12

## 2024-12-09 ENCOUNTER — TELEPHONE (OUTPATIENT)
Dept: FAMILY MEDICINE | Facility: CLINIC | Age: 64
End: 2024-12-09
Payer: COMMERCIAL

## 2024-12-09 DIAGNOSIS — F41.9 ANXIETY: ICD-10-CM

## 2024-12-09 DIAGNOSIS — F43.23 ADJUSTMENT DISORDER WITH MIXED ANXIETY AND DEPRESSED MOOD: ICD-10-CM

## 2024-12-25 ENCOUNTER — APPOINTMENT (OUTPATIENT)
Dept: GENERAL RADIOLOGY | Facility: CLINIC | Age: 64
End: 2024-12-25
Attending: EMERGENCY MEDICINE
Payer: COMMERCIAL

## 2024-12-25 ENCOUNTER — HOSPITAL ENCOUNTER (EMERGENCY)
Facility: CLINIC | Age: 64
Discharge: HOME | End: 2024-12-25
Attending: EMERGENCY MEDICINE
Payer: COMMERCIAL

## 2024-12-25 VITALS
SYSTOLIC BLOOD PRESSURE: 148 MMHG | TEMPERATURE: 99.6 F | DIASTOLIC BLOOD PRESSURE: 88 MMHG | OXYGEN SATURATION: 98 % | RESPIRATION RATE: 20 BRPM | HEART RATE: 76 BPM

## 2024-12-25 DIAGNOSIS — J06.9 URI WITH COUGH AND CONGESTION: ICD-10-CM

## 2024-12-25 LAB
FLUAV RNA SPEC QL NAA+PROBE: NEGATIVE
FLUBV RNA RESP QL NAA+PROBE: NEGATIVE
RSV RNA SPEC NAA+PROBE: NEGATIVE
SARS-COV-2 RNA RESP QL NAA+PROBE: NEGATIVE

## 2024-12-25 PROCEDURE — 99283 EMERGENCY DEPT VISIT LOW MDM: CPT | Performed by: EMERGENCY MEDICINE

## 2024-12-25 PROCEDURE — 87637 SARSCOV2&INF A&B&RSV AMP PRB: CPT | Performed by: EMERGENCY MEDICINE

## 2024-12-25 PROCEDURE — 99284 EMERGENCY DEPT VISIT MOD MDM: CPT | Mod: 25 | Performed by: EMERGENCY MEDICINE

## 2024-12-25 PROCEDURE — 71046 X-RAY EXAM CHEST 2 VIEWS: CPT

## 2024-12-25 RX ORDER — AZITHROMYCIN 250 MG/1
TABLET, FILM COATED ORAL
Qty: 6 TABLET | Refills: 0 | Status: SHIPPED | OUTPATIENT
Start: 2024-12-25

## 2024-12-25 ASSESSMENT — COLUMBIA-SUICIDE SEVERITY RATING SCALE - C-SSRS
2. HAVE YOU ACTUALLY HAD ANY THOUGHTS OF KILLING YOURSELF IN THE PAST MONTH?: NO
6. HAVE YOU EVER DONE ANYTHING, STARTED TO DO ANYTHING, OR PREPARED TO DO ANYTHING TO END YOUR LIFE?: NO
1. IN THE PAST MONTH, HAVE YOU WISHED YOU WERE DEAD OR WISHED YOU COULD GO TO SLEEP AND NOT WAKE UP?: NO

## 2024-12-25 ASSESSMENT — ACTIVITIES OF DAILY LIVING (ADL)
ADLS_ACUITY_SCORE: 41
ADLS_ACUITY_SCORE: 41

## 2024-12-25 NOTE — ED PROVIDER NOTES
"  History     Chief Complaint   Patient presents with    Cough     Chest congestion     HPI  History per patient, medical records    This is a 64-year-old male, history of CARY, GERD, anxiety/depression, chronic back pain presenting with cough.  Patient started feeling ill yesterday with cough symptoms.  He states yesterday it was just a \"short choppy cough\".  Today he has developed a deeper cough with some phlegm production.  He has sinus and nasal congestion.  He notes some pulsing sensation or occasional \"zings\" in his head.  No fevers.  No nausea, vomiting, bowel or bladder changes.  He does not smoke.  Patient states he has had intermittent cough and was on a Z-Gerard a couple of times in the last year.  No obvious sick contacts.    Allergies:  Allergies   Allergen Reactions    Triptans Nausea and Vomiting     Migraine medications       Problem List:    Patient Active Problem List    Diagnosis Date Noted    Anxiety 01/02/2023     Priority: Medium    Adjustment disorder with mixed anxiety and depressed mood 01/02/2023     Priority: Medium    Primary osteoarthritis of left knee 04/17/2019     Priority: Medium    Gastroesophageal reflux disease without esophagitis 05/29/2018     Priority: Medium    Chondromalacia, knee, left 02/12/2018     Priority: Medium    Other tear of medial meniscus of left knee as current injury, initial encounter 02/12/2018     Priority: Medium    Insomnia, unspecified type 08/10/2016     Priority: Medium    Chronic midline low back pain without sciatica 08/10/2016     Priority: Medium     Patient is followed by ERIBERTO KONG for ongoing prescription of pain medication.  All refills should be approved by this provider, or covering partner.    Medication(s): Oxycodone 5-3 25.   Maximum quantity per month: 90 for 3 months  Clinic visit frequency required: Q 6  months     Controlled substance agreement:  Encounter-Level CSA:     There are no encounter-level csa.      letter updated August 10, " 2016   Pain Clinic evaluation in the past: No    DIRE Total Score(s):  No flowsheet data found.    Last [facility] website verification:  done on august 10,2016   [email or web address]      Chronic pain syndrome 08/10/2016     Priority: Medium    Tinnitus of both ears 01/20/2015     Priority: Medium    CARY (obstructive sleep apnea) 02/03/2010     Priority: Medium     PDS 3/2009 AHI 26 Usha 84%       Headache 03/26/2007     Priority: Medium     Patient is followed by NIRAV MONAE for ongoing prescription of narcotic pain medicine.  Med: percocet.   Maximum use per month: 15-30  Expected duration: ongoing  Narcotic agreement on file: YES  Clinic visit recommended: Q 6  Months    Patient is followed by ERIBERTO KONG for ongoing prescription of narcotic pain medicine.  Med: Percocet.   Maximum use per month:  15-30  Expected duration: Chronic  Narcotic agreement on file: YES  Clinic visit recommended: Q 6  Months  January 31, 2013     Problem list name updated by automated process. Provider to review          Past Medical History:    Past Medical History:   Diagnosis Date    Chronic back pain     Chronic headaches     Esophageal reflux     GERD (gastroesophageal reflux disease)     Motion sickness     CARY (obstructive sleep apnea) 03/2009    Primary osteoarthritis of left knee 04/17/2019    RLS (restless legs syndrome)        Past Surgical History:    Past Surgical History:   Procedure Laterality Date    ARTHROSCOPY KNEE WITH MEDIAL MENISCECTOMY Left 6/8/2018    Procedure: ARTHROSCOPY KNEE WITH MEDIAL MENISCECTOMY;  Left knee arthroscopy with arthroscopic partial meniscectomy;  Surgeon: Dane Blanco DO;  Location: [facility]    COLONOSCOPY  06/12/07    COLONOSCOPY N/A 7/12/2017    Procedure: COLONOSCOPY;  colonoscopy;  Surgeon: Lake Mueller MD;  Location:  GI    HC TRABECULOPLASTY BY LASER SURGERY  2000    LASIK    SEPTOPLASTY, TURBINOPLASTY, COMBINED N/A 4/26/2016    Procedure: COMBINED  SEPTOPLASTY, TURBINOPLASTY;  Surgeon: Ken Molina MD;  Location: [facility]         Family History:    Family History   Problem Relation Age of Onset    Cerebrovascular Disease Mother 66        aneursym -  at 66    Other - See Comments Sister         Half sister, heart problems unsure what kind    Other - See Comments Brother         Was in a fire    No Known Problems Maternal Grandmother     No Known Problems Maternal Grandfather     Arthritis Maternal Aunt     Arthritis Maternal Uncle     Muscular Disorder Daughter 18        Small fiber neuropathy    Unknown/Adopted Father     Unknown/Adopted Paternal Grandmother     Unknown/Adopted Paternal Grandfather     Hypertension No family hx of     Diabetes No family hx of        Social History:  Marital Status:   [2]  Social History     Tobacco Use    Smoking status: Never     Passive exposure: Never    Smokeless tobacco: Never    Tobacco comments:     no smokers in household   Vaping Use    Vaping status: Never Used   Substance Use Topics    Alcohol use: No    Drug use: No        Medications:    azithromycin (ZITHROMAX) 250 MG tablet  atorvastatin (LIPITOR) 40 MG tablet  HORIZANT 600 MG tablet  methadone (DOLOPHINE) 5 MG tablet  Multiple Vitamin (ONE-A-DAY MENS PO)  omeprazole (PRILOSEC) 20 MG DR capsule  SENNA-PLUS 8.6-50 MG tablet  sertraline (ZOLOFT) 50 MG tablet  tamsulosin (FLOMAX) 0.4 MG capsule          Review of Systems  All other ROS reviewed and are negative or non-contributory except as stated in HPI.     Physical Exam   BP: (!) 170/111  Pulse: 91  Temp: 98.4  F (36.9  C)  Resp: 14  SpO2: 97 %      Physical Exam    ED Course (with Medical Decision Making)    Pt seen and examined by me.  RN and EPIC notes reviewed.       Patient with cough symptoms.  Possible/most likely viral illness.  COVID swab sent.    Flu/RSV/COVID-negative.  I did do a chest x-ray which was clear.    Discussed options with the patient.  Because he has a  productive cough, and because in the past he has responded to Z-Gerard well we are going to give an Rx.  He can choose to start this today versus wait for a couple of days.  Drink plenty of fluids, follow-up in clinic if not improving.  Return for worsening, changes or concerns.  Okay to continue over-the-counter medications and we did discuss trying Mucinex/Mucinex DM.      Procedures  Results for orders placed or performed during the hospital encounter of 12/25/24 (from the past 24 hours)   Influenza A/B, RSV and SARS-CoV2 PCR (COVID-19) Nasopharyngeal    Specimen: Nasopharyngeal; Swab   Result Value Ref Range    Influenza A PCR Negative Negative    Influenza B PCR Negative Negative    RSV PCR Negative Negative    SARS CoV2 PCR Negative Negative    Narrative    Testing was performed using the Xpert Xpress CoV2/Flu/RSV Assay on the Cepheid GeneXpert Instrument. This test should be ordered for the detection of SARS-CoV2, influenza, and RSV viruses in individuals with signs and symptoms of respiratory tract infection. This test is for in vitro diagnostic use under the [place] FDA for laboratories certified under CLIA to perform high or moderate complexity testing. This test has been [place] FDA cleared. A negative result does not rule out the presence of PCR inhibitors in the specimen or target RNA in concentration below the limit of detection for the assay. If only one viral target is positive but coinfection with multiple targets is suspected, the sample should be re-tested with another FDA cleared, approved, or authorized test, if coninfection would change clinical management. This test was validated by the [facility]. These laboratories are certified under the Clinical Laboratory Improvement Amendments of 1988 (CLIA-88) as qualified to perfom high complexity laboratory testing.   Chest XR,  PA & LAT    Narrative    EXAM: XR CHEST 2 VIEWS  LOCATION: [facility]  DATE:  12/25/2024    INDICATION: cough  COMPARISON: 8/7/2024      Impression    IMPRESSION: No pneumothorax or pleural effusion. No focal consolidation. Cardiac silhouette within normal limits. Mildly tortuous aorta. No acute osseous abnormality.       Medications - No data to display    Assessments & Plan     I have reviewed the findings, diagnosis, plan and need for follow up with the patient.    Discharge Medication List as of 12/25/2024 11:23 AM        START taking these medications    Details   azithromycin (ZITHROMAX) 250 MG tablet Take 2 tablets today, then take 1 tablet daily for the next 4 days., Disp-6 tablet, R-0, [facility]             Final diagnoses:   URI with cough and congestion     Disposition: Patient discharged home in stable condition.  Plan as above.  Return for concerns.     Note: Chart documentation done in part with Dragon Voice Recognition software. Although reviewed after completion, some word and grammatical errors may remain.   12/25/2024   [facility] EMERGENCY DEPT       Lisa Brock MD  12/25/24 [id number]

## 2024-12-25 NOTE — DISCHARGE INSTRUCTIONS
Your chest x-ray was clear.  The COVID/RSV/influenza swab was negative.    It is possible that this is any number of respiratory viral illnesses.  However, to err on the side of caution I am going to start you on an antibiotic, Zithromax.  You can choose to start this today if desired.    Continue over-the-counter medications such as Mucinex or Mucinex DM for your cough and congestion.  Drink lots of fluids and rest.    Return at anytime for significant worsening, changes or concerns.    I hope that you feel much better quickly.  Melissa Li and have a wonderful new year!!

## 2024-12-25 NOTE — ED TRIAGE NOTES
Here with cough, chest congestion. States it stated yesterday but today has intermittent sensation of press use pulsating into head. Thinks he may have Covid     Triage Assessment (Adult)       Row Name 12/25/24 0936          Triage Assessment    Airway WDL WDL        Respiratory WDL    Respiratory WDL X;cough     Cough Frequency frequent        Skin Circulation/Temperature WDL    Skin Circulation/Temperature WDL WDL        Cardiac WDL    Cardiac WDL WDL        Peripheral/Neurovascular WDL    Peripheral Neurovascular WDL WDL        Cognitive/Neuro/Behavioral WDL    Cognitive/Neuro/Behavioral WDL WDL

## 2025-02-12 ASSESSMENT — PATIENT HEALTH QUESTIONNAIRE - PHQ9
10. IF YOU CHECKED OFF ANY PROBLEMS, HOW DIFFICULT HAVE THESE PROBLEMS MADE IT FOR YOU TO DO YOUR WORK, TAKE CARE OF THINGS AT HOME, OR GET ALONG WITH OTHER PEOPLE: NOT DIFFICULT AT ALL
SUM OF ALL RESPONSES TO PHQ QUESTIONS 1-9: 5
SUM OF ALL RESPONSES TO PHQ QUESTIONS 1-9: 5

## 2025-02-13 ENCOUNTER — VIRTUAL VISIT (OUTPATIENT)
Dept: FAMILY MEDICINE | Facility: CLINIC | Age: 65
End: 2025-02-13
Payer: COMMERCIAL

## 2025-02-13 DIAGNOSIS — E78.5 HYPERLIPIDEMIA, UNSPECIFIED HYPERLIPIDEMIA TYPE: ICD-10-CM

## 2025-02-13 DIAGNOSIS — Z13.1 SCREENING FOR DIABETES MELLITUS: ICD-10-CM

## 2025-02-13 DIAGNOSIS — F43.23 ADJUSTMENT DISORDER WITH MIXED ANXIETY AND DEPRESSED MOOD: ICD-10-CM

## 2025-02-13 DIAGNOSIS — J02.9 SORE THROAT: Primary | ICD-10-CM

## 2025-02-13 DIAGNOSIS — F41.9 ANXIETY: ICD-10-CM

## 2025-02-13 RX ORDER — ATORVASTATIN CALCIUM 40 MG/1
40 TABLET, FILM COATED ORAL DAILY
Qty: 90 TABLET | Refills: 2 | Status: SHIPPED | OUTPATIENT
Start: 2025-02-13

## 2025-02-13 ASSESSMENT — ANXIETY QUESTIONNAIRES
GAD7 TOTAL SCORE: 0
5. BEING SO RESTLESS THAT IT IS HARD TO SIT STILL: NOT AT ALL
GAD7 TOTAL SCORE: 0
2. NOT BEING ABLE TO STOP OR CONTROL WORRYING: NOT AT ALL
4. TROUBLE RELAXING: NOT AT ALL
7. FEELING AFRAID AS IF SOMETHING AWFUL MIGHT HAPPEN: NOT AT ALL
8. IF YOU CHECKED OFF ANY PROBLEMS, HOW DIFFICULT HAVE THESE MADE IT FOR YOU TO DO YOUR WORK, TAKE CARE OF THINGS AT HOME, OR GET ALONG WITH OTHER PEOPLE?: NOT DIFFICULT AT ALL
IF YOU CHECKED OFF ANY PROBLEMS ON THIS QUESTIONNAIRE, HOW DIFFICULT HAVE THESE PROBLEMS MADE IT FOR YOU TO DO YOUR WORK, TAKE CARE OF THINGS AT HOME, OR GET ALONG WITH OTHER PEOPLE: NOT DIFFICULT AT ALL
7. FEELING AFRAID AS IF SOMETHING AWFUL MIGHT HAPPEN: NOT AT ALL
6. BECOMING EASILY ANNOYED OR IRRITABLE: NOT AT ALL
3. WORRYING TOO MUCH ABOUT DIFFERENT THINGS: NOT AT ALL
GAD7 TOTAL SCORE: 0
1. FEELING NERVOUS, ANXIOUS, OR ON EDGE: NOT AT ALL

## 2025-02-13 NOTE — PROGRESS NOTES
"Benjamín is a 64 year old who is being evaluated via a billable video visit.    How would you like to obtain your AVS? MyChart  If the video visit is dropped, the invitation should be resent by: Text to cell phone: [phone]  Will anyone else be joining your video visit? No      Assessment & Plan     Hyperlipidemia, unspecified hyperlipidemia type  Labs updated in August 2024, tolerating medication well recheck 6 months with labs and face-to-face visit  - atorvastatin (LIPITOR) 40 MG tablet; Take 1 tablet (40 mg) by mouth daily.    Anxiety  Stable, continue current meds does not wish to make any medication changes  - sertraline (ZOLOFT) 50 MG tablet; Take 1 tablet (50 mg) by mouth daily.  Recheck 6 months  Adjustment disorder with mixed anxiety and depressed mood  Stable, continue current dosage  - sertraline (ZOLOFT) 50 MG tablet; Take 1 tablet (50 mg) by mouth daily.    Sore throat  Patient will call to schedule lab appointment if symptoms are not improving tomorrow  - Streptococcus A Rapid Screen w/Reflex to PCR - Clinic Collect; Future    Screening for diabetes mellitus  Will do hemoglobin A1c, he does not believe he is fasting at his last but that was in August so difficult to know for sure  - Hemoglobin A1c; Future          BMI  Estimated body mass index is 27.02 kg/m  as calculated from the following:    Height as of 8/7/24: 1.753 m (5' 9\").    Weight as of 8/7/24: 83 kg (183 lb).   BMI was not addressed at virtual visit, no weight was taken will discuss at next face to face visit.        This chart documentation was completed in part with Dragon voice recognition software.  Documentation is reviewed after completion, however, some words and grammatical errors may remain.  Aleida Larson PA-C      Subjective   Benjamín is a 64 year old, presenting for the following health issues:  Recheck Medication        2/13/2025     1:45 PM   Additional Questions   Roomed by Bina LAKHANI   Accompanied by None         2/13/2025     " 1:45 PM   Patient Reported Additional Medications   Patient reports taking the following new medications NA     History of Present Illness       Reason for visit:  Refills    He eats 2-3 servings of fruits and vegetables daily.He consumes 1 sweetened beverage(s) daily.He exercises with enough effort to increase his heart rate 20 to 29 minutes per day.  He exercises with enough effort to increase his heart rate 4 days per week.   He is taking medications regularly.     He and his wife have been sick multiple times in the past year.  He was seen on [place] Day in the ER for a cough and was treated with Zithromax.  In the last day he has started to feel poorly again he is having a sore throat.  He would appreciate an order for a strep test in the event this is not improved.  He continues to have a cough producing sputum.  He has had this for many months.  He did have a negative chest x-ray while in the ER on [place] Day.  Hyperlipidemia Follow-Up    Are you regularly taking any medication or supplement to lower your cholesterol?   Yes- atorvastatin  Are you having muscle aches or other side effects that you think could be caused by your cholesterol lowering medication?  No    Depression and Anxiety --overall he feels that his mental health is stable, he does not wish to make any changes is not having side effects and likes the medication he is on currently  How are you doing with your depression since your last visit? No change  How are you doing with your anxiety since your last visit?  No change  Are you having other symptoms that might be associated with depression or anxiety? No  Have you had a significant life event? No   Do you have any concerns with your use of alcohol or other drugs? No    Social History     Tobacco Use    Smoking status: Never     Passive exposure: Never    Smokeless tobacco: Never    Tobacco comments:     no smokers in household   Vaping Use    Vaping status: Never Used   Substance Use  Topics    Alcohol use: No    Drug use: No         10/5/2023     8:47 AM 2/18/2024    10:53 AM 2/12/2025     7:05 PM   PHQ   PHQ-9 Total Score 9 13 5    Q9: Thoughts of better off dead/self-harm past 2 weeks Not at all Not at all Not at all       Patient-reported         6/11/2023     7:35 PM 2/19/2024     8:34 AM 2/13/2025     1:43 PM   GENARO-7 SCORE   Total Score 10 (moderate anxiety) 3 (minimal anxiety) 0 (minimal anxiety)   Total Score 10 3 0        Patient-reported         2/12/2025     7:05 PM   Last PHQ-9   1.  Little interest or pleasure in doing things 0   2.  Feeling down, depressed, or hopeless 0   3.  Trouble falling or staying asleep, or sleeping too much 3   4.  Feeling tired or having little energy 2   5.  Poor appetite or overeating 0   6.  Feeling bad about yourself 0   7.  Trouble concentrating 0   8.  Moving slowly or restless 0   Q9: Thoughts of better off dead/self-harm past 2 weeks 0   PHQ-9 Total Score 5        Patient-reported         2/13/2025     1:43 PM   GENARO-7    1. Feeling nervous, anxious, or on edge 0   2. Not being able to stop or control worrying 0   3. Worrying too much about different things 0   4. Trouble relaxing 0   5. Being so restless that it is hard to sit still 0   6. Becoming easily annoyed or irritable 0   7. Feeling afraid, as if something awful might happen 0   GENARO-7 Total Score 0    If you checked any problems, how difficult have they made it for you to do your work, take care of things at home, or get along with other people? Not difficult at all       Patient-reported           Review of Systems  Constitutional, HEENT, cardiovascular, pulmonary, gi and gu systems are negative, except as otherwise noted.      Objective           Vitals:  No vitals were obtained today due to virtual visit.    Physical Exam   GENERAL: alert and no distress  EYES: Eyes grossly normal to inspection.  No discharge or erythema, or obvious scleral/conjunctival abnormalities.  RESP: No audible  wheeze, cough, or visible cyanosis.    SKIN: Visible skin clear. No significant rash, abnormal pigmentation or lesions.  NEURO: Cranial nerves grossly intact.  Mentation and speech appropriate for age.  PSYCH: Appropriate affect, tone, and pace of words          Video-Visit Details    Type of service:  Video Visit   Originating Location (pt. Location): Home    Distant Location (provider location):  On-site  Platform used for Video Visit: Carlos  Signed Electronically by: Aleida Larson PA-C

## 2025-03-20 ENCOUNTER — OFFICE VISIT (OUTPATIENT)
Dept: FAMILY MEDICINE | Facility: CLINIC | Age: 65
End: 2025-03-20
Payer: COMMERCIAL

## 2025-03-20 VITALS
HEART RATE: 58 BPM | OXYGEN SATURATION: 99 % | TEMPERATURE: 98.1 F | RESPIRATION RATE: 12 BRPM | SYSTOLIC BLOOD PRESSURE: 130 MMHG | BODY MASS INDEX: 27.84 KG/M2 | WEIGHT: 188.5 LBS | DIASTOLIC BLOOD PRESSURE: 74 MMHG

## 2025-03-20 DIAGNOSIS — L98.9 SKIN LESION: Primary | ICD-10-CM

## 2025-03-20 ASSESSMENT — PAIN SCALES - GENERAL: PAINLEVEL_OUTOF10: NO PAIN (0)

## 2025-03-20 NOTE — PROGRESS NOTES
"  Assessment & Plan     Skin lesion  2 lesions of concern 1 on his left cheek which is more concerning the other lesion is on his back which may just be a seborrheic keratosis that peeled off.  I would like him seen primarily for his left cheek lesion.  - Adult Dermatology  Referral; Future    See avs      BMI  Estimated body mass index is 27.84 kg/m  as calculated from the following:    Height as of 8/7/24: 1.753 m (5' 9\").    Weight as of this encounter: 85.5 kg (188 lb 8 oz).   Weight management plan: Discussed healthy diet and exercise guidelines      This chart documentation was completed in part with Dragon voice recognition software.  Documentation is reviewed after completion, however, some words and grammatical errors may remain.  Aleida Larson PA-C      Amirah Butts is a 65 year old, presenting for the following health issues:  Derm Problem        3/20/2025    10:12 AM   Additional Questions   Roomed by YK   Accompanied by self     History of Present Illness       Reason for visit:  Spot on my face and back.   He is taking medications regularly.        He has had a red spot on his left cheek for the past 2 years he does believe it is getting larger in the sun during the summer it will get more scabby and more red.  It does not itch burn or bleed on its own but he does try to pick at it and scratch some of the flakiness off.  He has had no personal history of skin cancer.  He does not know his father's side of the family but his maternal aunts have had skin cancer but they also spend a lot of time in the sun.  He reports another lesion on his back about 3 weeks ago his wife commented that it blistered they placed a Band-Aid on it for 4 to 5 days and when they took the Band-Aid off the lesion had peeled off.  He has other brown moles on his back he does not recall what it look like before it blistered and he did not take a picture of this.      Review of Systems  As documented above     "   Objective    /74   Pulse 58   Temp 98.1  F (36.7  C) (Temporal)   Resp 12   Wt 85.5 kg (188 lb 8 oz)   SpO2 99%   BMI 27.84 kg/m    Body mass index is 27.84 kg/m .  Physical Exam   GENERAL: alert and no distress  SKIN: left cheek-erythematous lesion over the malar eminence with telangiectasias portions of this are blanchable but he does have a flaky firmer texture to the more lateral aspect of this lesion, see photo in media  Second lesion on his left thoracolumbar region is just an erythematous macule at this point there is a central area that is slightly elevated photo was obtained of this as well he has multiple Gary Ks on his back            Signed Electronically by: Aleida Larson PA-C

## 2025-03-20 NOTE — PATIENT INSTRUCTIONS
[facility] is in [place]  [facility] in [place]  [facility] various locations.     If we cannot see you within 1 month, please check with insurance regarding coverage and let me know if you need a referral.    Aleida Larson PA-C

## 2025-03-24 ENCOUNTER — TRANSFERRED RECORDS (OUTPATIENT)
Dept: HEALTH INFORMATION MANAGEMENT | Facility: CLINIC | Age: 65
End: 2025-03-24

## 2025-05-29 ENCOUNTER — TRANSFERRED RECORDS (OUTPATIENT)
Dept: HEALTH INFORMATION MANAGEMENT | Facility: CLINIC | Age: 65
End: 2025-05-29
Payer: COMMERCIAL

## 2025-06-22 DIAGNOSIS — K21.9 GASTROESOPHAGEAL REFLUX DISEASE WITHOUT ESOPHAGITIS: ICD-10-CM

## 2025-06-23 RX ORDER — OMEPRAZOLE 20 MG/1
20 CAPSULE, DELAYED RELEASE ORAL DAILY
Qty: 90 CAPSULE | Refills: 2 | Status: SHIPPED | OUTPATIENT
Start: 2025-06-23

## 2025-09-03 ENCOUNTER — TRANSFERRED RECORDS (OUTPATIENT)
Dept: HEALTH INFORMATION MANAGEMENT | Facility: CLINIC | Age: 65
End: 2025-09-03
Payer: COMMERCIAL

## (undated) DEVICE — GLOVE PROTEXIS W/NEU-THERA 7.0  2D73TE70

## (undated) DEVICE — GLOVE PROTEXIS BLUE W/NEU-THERA 8.0  2D73EB80

## (undated) DEVICE — CAST PADDING 4" WEBRIL UNSTERILE

## (undated) DEVICE — BNDG COBAN 6"X5YDS STERILE

## (undated) DEVICE — PACK ARTHROSCOPY KNEE LATEX FREE SOP32CAFCN

## (undated) DEVICE — DRSG GAUZE 4X4" TRAY

## (undated) DEVICE — TUBING SUCTION 12"X1/4" N612

## (undated) DEVICE — GLOVE PROTEXIS W/NEU-THERA 7.5  2D73TE75

## (undated) DEVICE — TUBING INFLOW CROSSFLOW 0450-000-100

## (undated) DEVICE — DRAPE STERI U 1015

## (undated) DEVICE — SOL NACL 0.9% IRRIG 3000ML BAG 07972-08

## (undated) DEVICE — DRAPE EXTREMITY W/ARMBOARD 29405

## (undated) DEVICE — GLOVE ESTEEM BLUE W/NEU-THERA 7.5  2D73PB75

## (undated) DEVICE — SYR 50ML LL W/O NDL 309653

## (undated) RX ORDER — FENTANYL CITRATE 50 UG/ML
INJECTION, SOLUTION INTRAMUSCULAR; INTRAVENOUS
Status: DISPENSED
Start: 2017-07-12

## (undated) RX ORDER — KETOROLAC TROMETHAMINE 30 MG/ML
INJECTION, SOLUTION INTRAMUSCULAR; INTRAVENOUS
Status: DISPENSED
Start: 2018-06-08

## (undated) RX ORDER — FENTANYL CITRATE 50 UG/ML
INJECTION, SOLUTION INTRAMUSCULAR; INTRAVENOUS
Status: DISPENSED
Start: 2018-06-08

## (undated) RX ORDER — NITROGLYCERIN 5 MG/ML
VIAL (ML) INTRAVENOUS
Status: DISPENSED
Start: 2017-04-04

## (undated) RX ORDER — HEPARIN SODIUM 1000 [USP'U]/ML
INJECTION, SOLUTION INTRAVENOUS; SUBCUTANEOUS
Status: DISPENSED
Start: 2017-04-04

## (undated) RX ORDER — VERAPAMIL HYDROCHLORIDE 2.5 MG/ML
INJECTION, SOLUTION INTRAVENOUS
Status: DISPENSED
Start: 2017-04-04

## (undated) RX ORDER — MORPHINE SULFATE 0.5 MG/ML
INJECTION, SOLUTION EPIDURAL; INTRATHECAL; INTRAVENOUS
Status: DISPENSED
Start: 2018-06-08

## (undated) RX ORDER — FENTANYL CITRATE 50 UG/ML
INJECTION, SOLUTION INTRAMUSCULAR; INTRAVENOUS
Status: DISPENSED
Start: 2017-04-04

## (undated) RX ORDER — BUPIVACAINE HYDROCHLORIDE 2.5 MG/ML
INJECTION, SOLUTION EPIDURAL; INFILTRATION; INTRACAUDAL
Status: DISPENSED
Start: 2018-06-08

## (undated) RX ORDER — LIDOCAINE HYDROCHLORIDE AND EPINEPHRINE 10; 10 MG/ML; UG/ML
INJECTION, SOLUTION INFILTRATION; PERINEURAL
Status: DISPENSED
Start: 2018-06-08

## (undated) RX ORDER — BUPIVACAINE HYDROCHLORIDE AND EPINEPHRINE 2.5; 5 MG/ML; UG/ML
INJECTION, SOLUTION EPIDURAL; INFILTRATION; INTRACAUDAL; PERINEURAL
Status: DISPENSED
Start: 2018-06-08